# Patient Record
Sex: FEMALE | Race: BLACK OR AFRICAN AMERICAN | HISPANIC OR LATINO | Employment: STUDENT | URBAN - METROPOLITAN AREA
[De-identification: names, ages, dates, MRNs, and addresses within clinical notes are randomized per-mention and may not be internally consistent; named-entity substitution may affect disease eponyms.]

---

## 2017-07-24 ENCOUNTER — HOSPITAL ENCOUNTER (EMERGENCY)
Facility: HOSPITAL | Age: 22
Discharge: HOME | End: 2017-07-24
Attending: EMERGENCY MEDICINE | Admitting: EMERGENCY MEDICINE
Payer: COMMERCIAL

## 2017-07-24 VITALS
SYSTOLIC BLOOD PRESSURE: 118 MMHG | DIASTOLIC BLOOD PRESSURE: 59 MMHG | HEART RATE: 72 BPM | TEMPERATURE: 98.5 F | RESPIRATION RATE: 18 BRPM | OXYGEN SATURATION: 100 % | WEIGHT: 210.76 LBS

## 2017-07-24 DIAGNOSIS — R55 SYNCOPE AND COLLAPSE: Primary | ICD-10-CM

## 2017-07-24 PROCEDURE — 93005 ELECTROCARDIOGRAM TRACING: CPT | Performed by: EMERGENCY MEDICINE

## 2017-07-24 PROCEDURE — 99284 EMERGENCY DEPT VISIT MOD MDM: CPT

## 2017-07-26 LAB
ATRIAL RATE: 93 BPM
P AXIS: 75 DEGREES
PR INTERVAL: 160 MS
QRS AXIS: 69 DEGREES
QRSD INTERVAL: 92 MS
QT INTERVAL: 324 MS
QTC INTERVAL: 402 MS
T WAVE AXIS: 52 DEGREES
VENTRICULAR RATE: 93 BPM

## 2017-09-20 ENCOUNTER — ALLSCRIPTS OFFICE VISIT (OUTPATIENT)
Dept: OTHER | Facility: OTHER | Age: 22
End: 2017-09-20

## 2017-09-20 DIAGNOSIS — F32.9 MAJOR DEPRESSIVE DISORDER, SINGLE EPISODE: ICD-10-CM

## 2017-09-20 DIAGNOSIS — R55 SYNCOPE AND COLLAPSE: ICD-10-CM

## 2017-09-20 DIAGNOSIS — E66.9 OBESITY: ICD-10-CM

## 2018-01-11 NOTE — MISCELLANEOUS
Message  telephone call from patient  Requesting refill of OCP--Annual exam due 8/2015  Rx  for 2 month supply sent to [facility] pharmacy  Partner to schedule AE to obtain any additional refills beyond that      Active Problems    1  Back pain (724 5) (M54 9)   2  Contraception management (V25 9) (Z30 9)   3  Depression (311) (F32 9)   4  Obesity (278 00) (E66 9)   5  Pelvic pain in female (625 9) (R10 2)   6  Screen for STD (sexually transmitted disease) (V74 5) (Z11 3)   7  Vaginal discharge (623 5) (N89 8)   8  Vision problem (V41 0) (H54 7)   9  Yeast infection (112 9) (B37 9)    Current Meds   1  Ibuprofen 200 MG Oral Tablet; 3 tablets every 6 to 8 hrs as needed for pain; Therapy: [id number] to (Last Rx:11Aug2015)  Requested for: 11Aug2015 Ordered   2  Sprintec 28 0 25-35 MG-MCG Oral Tablet; TAKE 1 TABLET DAILY AS DIRECTED; Last   Rx:11Aug2015 Ordered    Allergies    1  No Known Drug Allergies    2  No Known Environmental Allergies   3   No Known Food Allergies    Signatures   Electronically signed by : Lokesh Hoover RN; Jun 20 2016  1:37PM EST                       (Author)

## 2018-01-12 NOTE — MISCELLANEOUS
Provider Comments  Provider Comments:   pt did not show  needs to reschedule        Signatures   Electronically signed by : Pelon Romero DO; Feb 12 2016 11:08AM EST                       (Author)    Electronically signed by : Abeba Escobedo DO; Feb 12 2016 11:29AM EST                       (Review)

## 2018-01-13 VITALS
BODY MASS INDEX: 33.19 KG/M2 | HEART RATE: 80 BPM | HEIGHT: 66 IN | WEIGHT: 206.5 LBS | DIASTOLIC BLOOD PRESSURE: 62 MMHG | RESPIRATION RATE: 18 BRPM | TEMPERATURE: 98.3 F | SYSTOLIC BLOOD PRESSURE: 110 MMHG

## 2018-01-25 ENCOUNTER — OFFICE VISIT (OUTPATIENT)
Dept: FAMILY MEDICINE CLINIC | Facility: CLINIC | Age: 23
End: 2018-01-25
Payer: COMMERCIAL

## 2018-01-25 VITALS
HEIGHT: 67 IN | WEIGHT: 211.4 LBS | BODY MASS INDEX: 33.18 KG/M2 | SYSTOLIC BLOOD PRESSURE: 120 MMHG | HEART RATE: 16 BPM | DIASTOLIC BLOOD PRESSURE: 70 MMHG | RESPIRATION RATE: 18 BRPM | TEMPERATURE: 98.7 F

## 2018-01-25 DIAGNOSIS — R10.2 PELVIC PAIN: ICD-10-CM

## 2018-01-25 DIAGNOSIS — R10.2 PELVIC PAIN: Primary | ICD-10-CM

## 2018-01-25 PROBLEM — Z30.09 FAMILY PLANNING: Status: ACTIVE | Noted: 2018-01-25

## 2018-01-25 PROBLEM — L70.0 ACNE VULGARIS: Status: ACTIVE | Noted: 2017-09-20

## 2018-01-25 PROBLEM — L85.8 KERATOSIS PILARIS: Status: ACTIVE | Noted: 2017-09-20

## 2018-01-25 LAB
BACTERIA UR QL AUTO: NORMAL /HPF
BILIRUB UR QL STRIP: NEGATIVE
CLARITY UR: CLEAR
COLOR UR: YELLOW
GLUCOSE UR STRIP-MCNC: NEGATIVE MG/DL
HGB UR QL STRIP.AUTO: NEGATIVE
HYALINE CASTS #/AREA URNS LPF: NORMAL /LPF
KETONES UR STRIP-MCNC: NEGATIVE MG/DL
LEUKOCYTE ESTERASE UR QL STRIP: NEGATIVE
NITRITE UR QL STRIP: NEGATIVE
NON-SQ EPI CELLS URNS QL MICRO: NORMAL /HPF
PH UR STRIP.AUTO: 7.5 [PH] (ref 4.5–8)
PROT UR STRIP-MCNC: NEGATIVE MG/DL
RBC #/AREA URNS AUTO: NORMAL /HPF
SL AMB  POCT GLUCOSE, UA: NORMAL
SL AMB LEUKOCYTE ESTERASE,UA: NORMAL
SL AMB POCT BILIRUBIN,UA: NORMAL
SL AMB POCT BLOOD,UA: NORMAL
SL AMB POCT CLARITY,UA: CLEAR
SL AMB POCT COLOR,UA: YELLOW
SL AMB POCT KETONES,UA: NORMAL
SL AMB POCT NITRITE,UA: NORMAL
SL AMB POCT PH,UA: 7.5
SL AMB POCT SPECIFIC GRAVITY,UA: 1
SL AMB POCT URINE HCG: NEGATIVE
SP GR UR STRIP.AUTO: 1.02 (ref 1–1.03)
UROBILINOGEN UR QL STRIP.AUTO: 1 E.U./DL
WBC #/AREA URNS AUTO: NORMAL /HPF

## 2018-01-25 PROCEDURE — 99213 OFFICE O/P EST LOW 20 MIN: CPT | Performed by: NURSE PRACTITIONER

## 2018-01-25 PROCEDURE — 81001 URINALYSIS AUTO W/SCOPE: CPT | Performed by: NURSE PRACTITIONER

## 2018-01-25 PROCEDURE — 81002 URINALYSIS NONAUTO W/O SCOPE: CPT | Performed by: NURSE PRACTITIONER

## 2018-01-25 PROCEDURE — 81025 URINE PREGNANCY TEST: CPT | Performed by: NURSE PRACTITIONER

## 2018-01-25 PROCEDURE — 81003 URINALYSIS AUTO W/O SCOPE: CPT | Performed by: NURSE PRACTITIONER

## 2018-01-25 PROCEDURE — 87660 TRICHOMONAS VAGIN DIR PROBE: CPT | Performed by: NURSE PRACTITIONER

## 2018-01-25 PROCEDURE — 87480 CANDIDA DNA DIR PROBE: CPT | Performed by: NURSE PRACTITIONER

## 2018-01-25 PROCEDURE — 87510 GARDNER VAG DNA DIR PROBE: CPT | Performed by: NURSE PRACTITIONER

## 2018-01-25 RX ORDER — IBUPROFEN 200 MG
3 TABLET ORAL
COMMUNITY
Start: 2014-10-16 | End: 2018-04-12 | Stop reason: DRUGHIGH

## 2018-01-25 RX ORDER — CLINDAMYCIN PHOSPHATE 10 MG/G
1 GEL TOPICAL 2 TIMES DAILY
COMMUNITY
Start: 2017-09-20 | End: 2018-04-26 | Stop reason: HOSPADM

## 2018-01-25 RX ORDER — NORGESTIMATE AND ETHINYL ESTRADIOL 0.25-0.035
1 KIT ORAL DAILY
COMMUNITY
Start: 2016-09-15 | End: 2018-04-26 | Stop reason: HOSPADM

## 2018-01-25 RX ORDER — NORGESTREL-ETHINYL ESTRADIOL 0.3-0.03MG
TABLET ORAL
Refills: 4 | COMMUNITY
Start: 2018-01-16 | End: 2019-04-22 | Stop reason: SDUPTHER

## 2018-01-25 NOTE — PROGRESS NOTES
Assessment/Plan:    Pelvic pain  -cmp  -cbcwdiff  -Gc/CH DNA urine  -affirm  -urinalysis-poc  -US pelvic/abd transvaginal  Education- ddx, precautions for follow up/RTO/CALL/ED  Follow up here 1-2 weeks after labs and us complete  Subjective:      Patient ID: Kimberly Santos is a 25 y o  female  With c/o right sided pelvic pain that travels to the back  Onset December 2017  Occurs daily  Sharp pain, intermittent begins in RL abdomen/pelvis and travels to RL back  Duration-hours  Relieved when she lays on her right side  Denies N/V/D, change in appetite, hematuria, dysuria, vaginal d/c or blood, fever, malaise  LMP- 1/16/2018 and normal  +OCPs  +sexually active  Never had these symptoms before  No other treatments tried  Back Pain   Associated symptoms include abdominal pain and pelvic pain  Pertinent negatives include no dysuria, fever, headaches or numbness  Review of Systems   Constitutional: Negative for appetite change, chills, fever and unexpected weight change  Gastrointestinal: Positive for abdominal pain  Negative for abdominal distention, constipation, diarrhea, nausea and vomiting  Genitourinary: Positive for pelvic pain  Negative for difficulty urinating, dysuria, flank pain, hematuria, menstrual problem, urgency, vaginal discharge and vaginal pain  Musculoskeletal: Positive for back pain  Negative for joint swelling and myalgias  Skin: Negative for rash  Neurological: Negative for numbness and headaches  Objective:     Physical Exam   Constitutional: She appears well-developed and well-nourished  No distress  HENT:   Head: Normocephalic  Mouth/Throat: Oropharynx is clear and moist    Eyes: Conjunctivae are normal  Pupils are equal, round, and reactive to light  Neck: Normal range of motion  Neck supple  No thyromegaly present  Cardiovascular: Normal rate, regular rhythm, normal heart sounds and intact distal pulses      Pulmonary/Chest: Effort normal and breath sounds normal    Abdominal: Soft  Bowel sounds are normal  She exhibits no distension and no mass  There is tenderness  There is no rebound and no guarding  Genitourinary: Right adnexum displays tenderness  Right adnexum displays no mass  Vaginal discharge found

## 2018-01-25 NOTE — PATIENT INSTRUCTIONS
Pelvic Pain in Women   AMBULATORY CARE:   Pelvic pain  may happen on one or both sides of your pelvis  Pelvic pain may occur with certain body positions or activities, such as when you have sex or a bowel movement  It may worsen during your monthly period or after you sit or stand for a long time  Chronic pelvic pain is pain that continues for longer than 6 months  Call 911 for any of the following:   · You have severe chest pain and sudden trouble breathing  Seek care immediately if:   · You have heavy or unusual vaginal bleeding, and you feel lightheaded or faint  Contact your healthcare provider if:   · You have pelvic pain that does not go away after you take pain medicine  · You develop new symptoms or your symptoms are worse than before  · You have questions or concerns about your condition or care  Medicines: You may need any of the following:  · Pain medicine  may be given in pills or creams to relieve your pain  · Hormones  may be given if your pain gets worse with your menstrual cycle  · Antibiotics  may be given if your pain is caused by infection  · Take your medicine as directed  Contact your healthcare provider if you think your medicine is not helping or if you have side effects  Tell him or her if you are allergic to any medicine  Keep a list of the medicines, vitamins, and herbs you take  Include the amounts, and when and why you take them  Bring the list or the pill bottles to follow-up visits  Carry your medicine list with you in case of an emergency  Self-care:   · Keep a pain diary  Write down when your pain happens, how severe it is, and any other symptoms you have with your pain  A diary will help you keep track of pain cycles  It may also help your healthcare provider find out what is causing your pain  · Learn ways to relax  Deep breathing, meditation, and relaxation techniques can help decrease your pain   When you are tense, your pain may increase  · Change the foods you eat if you have irritable bowel syndrome  Ask your healthcare provider about the best foods for you  Follow up with your healthcare provider as directed:  Write down your questions so you remember to ask them during your visits  © 2017 [address] [employer] Information is for End User's use only and may not be sold, redistributed or otherwise used for commercial purposes  All illustrations and images included in CareNotes® are the copyrighted property of A D A M , Inc  or N[employer]  The above information is an  only  It is not intended as medical advice for individual conditions or treatments  Talk to your doctor, nurse or pharmacist before following any medical regimen to see if it is safe and effective for you

## 2018-01-27 LAB
CANDIDA RRNA VAG QL PROBE: NEGATIVE
G VAGINALIS RRNA GENITAL QL PROBE: NEGATIVE
T VAGINALIS RRNA GENITAL QL PROBE: NEGATIVE

## 2018-01-31 ENCOUNTER — TRANSCRIBE ORDERS (OUTPATIENT)
Dept: ADMINISTRATIVE | Facility: HOSPITAL | Age: 23
End: 2018-01-31

## 2018-01-31 ENCOUNTER — HOSPITAL ENCOUNTER (OUTPATIENT)
Dept: ULTRASOUND IMAGING | Facility: HOSPITAL | Age: 23
Discharge: HOME | End: 2018-01-31
Payer: COMMERCIAL

## 2018-01-31 DIAGNOSIS — R10.2 PELVIC PAIN: ICD-10-CM

## 2018-01-31 PROCEDURE — 76856 US EXAM PELVIC COMPLETE: CPT

## 2018-01-31 PROCEDURE — 76700 US EXAM ABDOM COMPLETE: CPT

## 2018-01-31 PROCEDURE — 76830 TRANSVAGINAL US NON-OB: CPT

## 2018-04-12 ENCOUNTER — OFFICE VISIT (OUTPATIENT)
Dept: FAMILY MEDICINE CLINIC | Facility: CLINIC | Age: 23
End: 2018-04-12
Payer: COMMERCIAL

## 2018-04-12 VITALS
HEIGHT: 67 IN | DIASTOLIC BLOOD PRESSURE: 80 MMHG | TEMPERATURE: 96.9 F | SYSTOLIC BLOOD PRESSURE: 118 MMHG | RESPIRATION RATE: 18 BRPM | BODY MASS INDEX: 35.82 KG/M2 | WEIGHT: 228.2 LBS | HEART RATE: 78 BPM

## 2018-04-12 DIAGNOSIS — Z00.00 HEALTH MAINTENANCE EXAMINATION: Primary | ICD-10-CM

## 2018-04-12 DIAGNOSIS — R55 VASOVAGAL SYNCOPE: ICD-10-CM

## 2018-04-12 DIAGNOSIS — F41.8 DEPRESSION WITH ANXIETY: ICD-10-CM

## 2018-04-12 DIAGNOSIS — M25.571 CHRONIC PAIN OF RIGHT ANKLE: ICD-10-CM

## 2018-04-12 DIAGNOSIS — E66.09 CLASS 2 OBESITY DUE TO EXCESS CALORIES WITHOUT SERIOUS COMORBIDITY IN ADULT, UNSPECIFIED BMI: ICD-10-CM

## 2018-04-12 DIAGNOSIS — G89.29 CHRONIC PAIN OF RIGHT ANKLE: ICD-10-CM

## 2018-04-12 DIAGNOSIS — N94.6 DYSMENORRHEA: ICD-10-CM

## 2018-04-12 PROCEDURE — 99395 PREV VISIT EST AGE 18-39: CPT | Performed by: NURSE PRACTITIONER

## 2018-04-12 RX ORDER — FLUOXETINE HYDROCHLORIDE 20 MG/1
20 CAPSULE ORAL DAILY
COMMUNITY
End: 2018-04-26 | Stop reason: HOSPADM

## 2018-04-12 RX ORDER — HYDROXYZINE PAMOATE 25 MG/1
25 CAPSULE ORAL 2 TIMES DAILY
COMMUNITY
End: 2018-04-26 | Stop reason: HOSPADM

## 2018-04-12 RX ORDER — IBUPROFEN 800 MG/1
800 TABLET ORAL EVERY 8 HOURS PRN
Qty: 30 TABLET | Refills: 1 | Status: SHIPPED | OUTPATIENT
Start: 2018-04-12 | End: 2018-07-12 | Stop reason: ALTCHOICE

## 2018-04-12 RX ORDER — MELOXICAM 15 MG/1
15 TABLET ORAL DAILY
Qty: 30 TABLET | Refills: 1 | Status: SHIPPED | OUTPATIENT
Start: 2018-04-12 | End: 2018-04-12 | Stop reason: SDUPTHER

## 2018-04-12 RX ORDER — MELOXICAM 15 MG/1
15 TABLET ORAL DAILY PRN
Qty: 30 TABLET | Refills: 0 | Status: SHIPPED | OUTPATIENT
Start: 2018-04-12 | End: 2018-04-26 | Stop reason: HOSPADM

## 2018-04-12 NOTE — PATIENT INSTRUCTIONS

## 2018-04-12 NOTE — PROGRESS NOTES
Problem List Items Addressed This Visit     Obesity    Relevant Orders    CBC and differential    Lipid panel    Basic metabolic panel    TSH, 3rd generation with T4 reflex    Chronic pain of right ankle     Onset 8 years ago after injury  Pain and reduced ROM           Relevant Medications    meloxicam (MOBIC) 15 mg tablet    Other Relevant Orders    XR ankle 3+ vw right    Ambulatory referral to Physical Therapy    Vasovagal syncope    Relevant Orders    Ambulatory referral to Cardiology    Depression with anxiety     Followed by counselor weekly and psychiatry monthly  at [facility]           Relevant Medications    FLUoxetine (PROzac) 20 mg capsule    hydrOXYzine pamoate (VISTARIL) 25 mg capsule    Other Relevant Orders    TSH, 3rd generation with T4 reflex    Dysmenorrhea     Uses ibuprofen prn  Requesting RX  Do not use on same day as taking meloxicam         Relevant Medications    ibuprofen (MOTRIN) 800 mg tablet    Health maintenance examination - Primary             Patient Counseling:  --Nutrition: Stressed importance of moderation in sodium/caffeine intake, saturated fat and cholesterol, caloric balance, sufficient intake of fresh fruits, vegetables, fiber, calcium, iron  --Exercise: Stressed the importance of regular exercise  --Substance Abuse: Discussed primary prevention of tobacco, alcohol, or other drug use; driving or other dangerous activities under the influence; availability of treatment for abuse  --Sexuality: Discussed sexually transmitted diseases, partner selection, use of condoms, avoidance of unintended pregnancy  and contraceptive alternatives  --Injury prevention: Discussed safety belts, safety helmets, smoke detector, smoking near bedding or upholstery  --Dental health: Discussed importance of regular tooth brushing, flossing, and dental visits  --Immunizations reviewed     --After hours service discussed with patient     Follow up in one year      Subjective   PHQ-9 Depression Screening    PHQ-9:    Frequency of the following problems over the past two weeks:       Little interest or pleasure in doing things:  2 - more than half the days  Feeling down, depressed, or hopeless:  0 - not at all  Trouble falling or staying asleep, or sleeping too much:  0 - not at all  Feeling tired or having little energy:  0 - not at all  Poor appetite or overeatin - more than half the days  Feeling bad about yourself - or that you are a failure or have let yourself or your family down:  2 - more than half the days  Trouble concentrating on things, such as reading the newspaper or watching television:  0 - not at all  Moving or speaking so slowly that other people could have noticed  Or the opposite - being so fidgety or restless that you have been moving around a lot more than usual:  0 - not at all  Thoughts that you would be better off dead, or of hurting yourself in some way:  0 - not at all  PHQ-2 Score:  2  PHQ-9 Score:  6       Pastor Santamaria is a 25 y o  female and is here for a comprehensive physical exam  The patient reports problems - :  Injured right ankle 8 years ago, began working out at gym recently and is it's painful and has limited rom  Do you take any herbs or supplements that were not prescribed by a doctor? no      The following portions of the patient's history were reviewed and updated as appropriate: allergies, current medications, past family history, past medical history, past social history, past surgical history and problem list    No past medical history on file      Current Outpatient Prescriptions:     CRYSELLE-28 0 3-30 MG-MCG per tablet, TAKE 1 TABLET BY ORAL ROUTE EVERY DAY, Disp: , Rfl: 4    FLUoxetine (PROzac) 20 mg capsule, Take 20 mg by mouth daily, Disp: , Rfl:     hydrOXYzine pamoate (VISTARIL) 25 mg capsule, Take 25 mg by mouth 2 (two) times a day, Disp: , Rfl:     benzoyl peroxide 5 % gel, Apply 1 application topically Twice daily, Disp: , Rfl:    clindamycin (CLINDAGEL) 1 % gel, Apply 1 application topically 2 (two) times a day, Disp: , Rfl:     ibuprofen (MOTRIN) 800 mg tablet, Take 1 tablet (800 mg total) by mouth every 8 (eight) hours as needed for mild pain or moderate pain Do not take on same day as taking meloxicam, Disp: 30 tablet, Rfl: 1    meloxicam (MOBIC) 15 mg tablet, Take 1 tablet (15 mg total) by mouth daily as needed for moderate pain, Disp: 30 tablet, Rfl: 0    norgestimate-ethinyl estradiol (SPRINTEC 28) 0 25-35 MG-MCG per tablet, Take 1 tablet by mouth daily, Disp: , Rfl:   No past surgical history on file  Social History   Family History   Problem Relation Age of Onset    No Known Problems Mother     Diabetes Father     Diabetes Maternal Grandfather     Diabetes Paternal Grandfather     Cancer Paternal Aunt      Review of Systems    Pertinent items are noted in HPI  Barbara Mena And comments in A/P    Objective   Physical Exam   Constitutional: She is oriented to person, place, and time  She appears well-developed and well-nourished  Obese     HENT:   Head: Normocephalic and atraumatic  Right Ear: External ear normal    Left Ear: External ear normal    Nose: Nose normal    Mouth/Throat: Oropharynx is clear and moist    Eyes: Conjunctivae and EOM are normal  Pupils are equal, round, and reactive to light  No scleral icterus  Neck: Normal range of motion  Neck supple  No JVD present  No thyromegaly present  Cardiovascular: Normal rate, regular rhythm, normal heart sounds and intact distal pulses  Pulmonary/Chest: Effort normal and breath sounds normal    Abdominal: Soft  Bowel sounds are normal    Musculoskeletal: She exhibits no edema or deformity  Right ankle Decreased ROM inversion/eversion d/t pain  Lateral maleolous tender   Lymphadenopathy:     She has no cervical adenopathy  Neurological: She is alert and oriented to person, place, and time  She displays normal reflexes  No cranial nerve deficit     Skin: Skin is warm and dry  Capillary refill takes less than 2 seconds  No rash noted  Psychiatric: She has a normal mood and affect

## 2018-04-26 ENCOUNTER — OFFICE VISIT (OUTPATIENT)
Dept: CARDIOLOGY CLINIC | Facility: CLINIC | Age: 23
End: 2018-04-26
Payer: COMMERCIAL

## 2018-04-26 VITALS
WEIGHT: 233 LBS | SYSTOLIC BLOOD PRESSURE: 110 MMHG | DIASTOLIC BLOOD PRESSURE: 60 MMHG | BODY MASS INDEX: 36.06 KG/M2 | OXYGEN SATURATION: 98 % | HEART RATE: 58 BPM

## 2018-04-26 DIAGNOSIS — R55 VASOVAGAL SYNCOPE: ICD-10-CM

## 2018-04-26 DIAGNOSIS — R55 SYNCOPE, UNSPECIFIED SYNCOPE TYPE: Primary | ICD-10-CM

## 2018-04-26 PROCEDURE — 93000 ELECTROCARDIOGRAM COMPLETE: CPT | Performed by: INTERNAL MEDICINE

## 2018-04-26 PROCEDURE — 99244 OFF/OP CNSLTJ NEW/EST MOD 40: CPT | Performed by: INTERNAL MEDICINE

## 2018-04-26 NOTE — PROGRESS NOTES
Cardiology Consultation     Yobany Holly  [id number]  1995  [facility]      1  Syncope, unspecified syncope type  POCT ECG    Holter monitor - 24 hour    Echo complete with contrast if indicated   2  Vasovagal syncope  Ambulatory referral to Cardiology    POCT ECG       Discussion/Summary:      36-year-old female  Two episodes of syncope  Seem to be related to orthostasis /dehydration low blood pressure  No clear cardiac etiology is found  She denies any palpitations  There were no other red flag symptoms  I recommended echocardiogram to exclude any structural heart disease  I have also ordered a 24 hr monitor to exclude any significant arrhythmias  We had a long discussion about remaining adequately hydrated  I have recommended electrolyte containing solutions such as Gatorade, smart water or propel water  If no significant abnormalities noted on the testing, can follow on an as needed basis  History of Present Illness:    36-year-old female  She is referred to the office by Chantale Jackson for two episodes of syncope  On both occasions, the patient was standing  One episode was relatively recently, with the patient was outside eating ice cream   She felt dizzy and lightheaded, and then fell to the ground  Patient's mother actually says she was out for "an hour" and then when she regained consciousness, she felt dizzy still  The other episode was a few months ago  Similarly, the patient was standing  She had been standing for a while  And again felt dizzy  She fell to the ground  Normal childhood  No cardiac testing in the past   No family history of any cardiac disease or sudden cardiac death  The patient denies any palpitations fluttering, chest pain, shortness of breath        She tells me that she tries to stay adequately hydrated, drinks about a glass of water a day  She has been told by other providers as well to increase her electrolytes and sodium intake  Patient Active Problem List   Diagnosis    Acne vulgaris    Back pain    Family planning    Depression    Keratosis pilaris    Obesity    Pelvic pain    Chronic pain of right ankle    Vasovagal syncope    Depression with anxiety    Dysmenorrhea    Health maintenance examination     No past medical history on file  Social History     Social History    Marital status: Single     Spouse name: N/A    Number of children: N/A    Years of education: N/A     Occupational History    college student      Social History Main Topics    Smoking status: Never Smoker    Smokeless tobacco: Never Used    Alcohol use No    Drug use: No    Sexual activity: Not on file     Other Topics Concern    Not on file     Social History Narrative    Always uses seat belt    College student     ancestry    Lives with parents, and 2 brothers  No smokers  Pt reports feeling safe at home  Primary spoken language English    Unemployed           Family History   Problem Relation Age of Onset    No Known Problems Mother     Diabetes Father     Diabetes Maternal Grandfather     Diabetes Paternal Grandfather     Cancer Paternal Aunt      No past surgical history on file      Current Outpatient Prescriptions:     benzoyl peroxide 5 % gel, Apply 1 application topically Twice daily, Disp: , Rfl:     CRYSELLE-28 0 3-30 MG-MCG per tablet, TAKE 1 TABLET BY ORAL ROUTE EVERY DAY, Disp: , Rfl: 4    ibuprofen (MOTRIN) 800 mg tablet, Take 1 tablet (800 mg total) by mouth every 8 (eight) hours as needed for mild pain or moderate pain Do not take on same day as taking meloxicam, Disp: 30 tablet, Rfl: 1  No Known Allergies    Vitals:    04/26/18 0851   BP: 110/60   BP Location: Right arm   Patient Position: Sitting   Cuff Size: Standard   Pulse: 58   SpO2: 98%   Weight: 106 kg (233 lb) Vitals:    04/26/18 0851   Weight: 106 kg (233 lb)          Body mass index is 36 06 kg/m²  Physical Exam:  GENERAL: Alert, well appearing, and in no distress  HEENT:  PERRL, EOMI, no scleral icterus, no conjunctival pallor  NECK:  Supple, No elevated JVP, no thyromegaly, no carotid bruits  HEART:  Regular rate and rhythm, normal S1/S2, no S3/S4, no murmur or rub  LUNGS:  Clear to auscultation bilaterally  ABDOMEN:  Soft, non-tender, positive bowel sounds, no rebound or guarding  EXTREMITIES:  No edema  VASCULAR:  Normal pedal pulses   NEURO: No focal deficits,  SKIN: Normal without suspicious lesions on exposed skin    ROS:  Positive for fainting  Except as noted in HPI, is otherwise reviewed in detail and a 12 point review of systems is negative  Labs:  Lab Results   Component Value Date     06/30/2015    K 4 0 06/30/2015     06/30/2015    CREATININE 0 59 (L) 06/30/2015    BUN 7 06/30/2015    CO2 24 06/30/2015    ALT 19 06/30/2015    AST 15 06/30/2015    INR 1 07 06/29/2015    HGBA1C 5 4 08/19/2014    WBC 9 60 06/30/2015    HGB 12 6 06/30/2015    HCT 33 8 (L) 06/30/2015     06/30/2015       Lab Results   Component Value Date    CHOL 193 08/19/2014     Lab Results   Component Value Date    HDL 42 08/19/2014     Lab Results   Component Value Date    LDLCALC 122 (H) 08/19/2014     Lab Results   Component Value Date    TRIG 146 08/19/2014     EKG:  Sinus rhythm, 58 beats per minute  Normal EKG

## 2018-05-11 ENCOUNTER — EVALUATION (OUTPATIENT)
Dept: PHYSICAL THERAPY | Facility: OTHER | Age: 23
End: 2018-05-11
Payer: COMMERCIAL

## 2018-05-11 DIAGNOSIS — M25.571 CHRONIC PAIN OF RIGHT ANKLE: ICD-10-CM

## 2018-05-11 DIAGNOSIS — G89.29 CHRONIC PAIN OF RIGHT ANKLE: ICD-10-CM

## 2018-05-11 PROCEDURE — G8979 MOBILITY GOAL STATUS: HCPCS | Performed by: PHYSICAL THERAPIST

## 2018-05-11 PROCEDURE — G8978 MOBILITY CURRENT STATUS: HCPCS | Performed by: PHYSICAL THERAPIST

## 2018-05-11 PROCEDURE — 97110 THERAPEUTIC EXERCISES: CPT | Performed by: PHYSICAL THERAPIST

## 2018-05-11 PROCEDURE — 97161 PT EVAL LOW COMPLEX 20 MIN: CPT | Performed by: PHYSICAL THERAPIST

## 2018-05-11 PROCEDURE — 97140 MANUAL THERAPY 1/> REGIONS: CPT | Performed by: PHYSICAL THERAPIST

## 2018-05-16 ENCOUNTER — OFFICE VISIT (OUTPATIENT)
Dept: PHYSICAL THERAPY | Facility: OTHER | Age: 23
End: 2018-05-16
Payer: COMMERCIAL

## 2018-05-16 DIAGNOSIS — G89.29 CHRONIC PAIN OF RIGHT ANKLE: Primary | ICD-10-CM

## 2018-05-16 DIAGNOSIS — M25.571 CHRONIC PAIN OF RIGHT ANKLE: Primary | ICD-10-CM

## 2018-05-16 PROCEDURE — 97110 THERAPEUTIC EXERCISES: CPT

## 2018-05-16 PROCEDURE — 97112 NEUROMUSCULAR REEDUCATION: CPT

## 2018-05-16 PROCEDURE — 97140 MANUAL THERAPY 1/> REGIONS: CPT

## 2018-05-16 NOTE — PROGRESS NOTES
Daily Note     Today's date: 2018  Patient name: Veronica Del Angel  : 1995  MRN: [id number]  Referring provider: MADDIE Hammer  Dx:   Encounter Diagnosis     ICD-10-CM    1  Chronic pain of right ankle M25 571     G89 29         1:1 with PTA CR 10:00-10:45  Subjective: Patient reports being very sore after her I E but currently denies pain  Daily compliance with HEP offering no questions or concerns  Objective: See treatment diary below    Precautions: none   Daily Treatment Diary      Manual                        Laser ant ankle   5 mins                     PROM DF stretch  CR                                                                                                   Exercise Diary                        TB  PF/DF   GTB  x20 ea                        Bike  5 mins                     Plantar F stretch   10"x10                     Mini squat   2x10                      HR/TR  20 ea                       slantboard gastroc stretch  10"x10                      rockerboard  20 ea                       wobbleboard 20 ea                       SLS  NV                                                                                                                                                                                                                                                                                                   Modalities                         CP  --                                                               Assessment: Tolerated treatment well   Into PT with flip flops- advised to wear sneakers to future sessions for improved ankle support with weight bearing activities  Cues for equal weight bearing with rockerboard and mini squats  Patient would benefit from continued PT      Plan: Continue per plan of care

## 2018-05-23 ENCOUNTER — OFFICE VISIT (OUTPATIENT)
Dept: PHYSICAL THERAPY | Facility: OTHER | Age: 23
End: 2018-05-23
Payer: COMMERCIAL

## 2018-05-23 DIAGNOSIS — M25.571 CHRONIC PAIN OF RIGHT ANKLE: Primary | ICD-10-CM

## 2018-05-23 DIAGNOSIS — G89.29 CHRONIC PAIN OF RIGHT ANKLE: Primary | ICD-10-CM

## 2018-05-23 PROCEDURE — 97110 THERAPEUTIC EXERCISES: CPT

## 2018-05-23 PROCEDURE — 97140 MANUAL THERAPY 1/> REGIONS: CPT

## 2018-05-23 PROCEDURE — 97112 NEUROMUSCULAR REEDUCATION: CPT

## 2018-05-23 NOTE — PROGRESS NOTES
Daily Note     Today's date: 2018  Patient name: Angi Arechiga  : 1995  MRN: [id number]  Referring provider: MADDIE Sin  Dx:   Encounter Diagnosis     ICD-10-CM    1  Chronic pain of right ankle M25 571     G89 29         1:1 with PTA CR 11:30- 12:30  Subjective: Denies increased pain after last session and currently  Objective: See treatment diary below    Precautions: none   Daily Treatment Diary      Manual                      Laser ant ankle   5 mins 5 mins                   PROM DF stretch  CR  CR                                                                                                 Exercise Diary                      TB  PF/DF   GTB  x20 ea   GTB  x30 ea                    Bike  5 mins  10 mins                   Plantar F stretch   10"x10  10"x10                   Mini squat   2x10  3x10                    HR/TR  20 ea   30 ea                     slantboard gastroc stretch  10"x10  10"x10                    rockerboard  20 ea   20ea/30"                    wobbleboard 20 ea   20 ea                     SLS  NV  30"x3                    Biodex   LOS  Static x2                                                                                                                                                                                                                                                                         Modalities                         CP  --                                                               Assessment: Tolerated treatment well   Attempted SLS wobbleboard however unable to tolerate  Limited tolerance to addition of SLS due to medial pain  Patient would benefit from continued PT      Plan: Continue per plan of care

## 2018-05-24 ENCOUNTER — HOSPITAL ENCOUNTER (OUTPATIENT)
Dept: NON INVASIVE DIAGNOSTICS | Facility: HOSPITAL | Age: 23
Discharge: HOME | End: 2018-05-24
Attending: INTERNAL MEDICINE
Payer: COMMERCIAL

## 2018-05-24 ENCOUNTER — TRANSCRIBE ORDERS (OUTPATIENT)
Dept: RADIOLOGY | Facility: HOSPITAL | Age: 23
End: 2018-05-24

## 2018-05-24 DIAGNOSIS — R55 SYNCOPE, UNSPECIFIED SYNCOPE TYPE: ICD-10-CM

## 2018-05-24 PROCEDURE — 93225 XTRNL ECG REC<48 HRS REC: CPT

## 2018-05-24 PROCEDURE — 93306 TTE W/DOPPLER COMPLETE: CPT

## 2018-05-24 PROCEDURE — 93306 TTE W/DOPPLER COMPLETE: CPT | Performed by: INTERNAL MEDICINE

## 2018-05-24 PROCEDURE — 93226 XTRNL ECG REC<48 HR SCAN A/R: CPT

## 2018-05-29 ENCOUNTER — TELEPHONE (OUTPATIENT)
Dept: NON INVASIVE DIAGNOSTICS | Facility: HOSPITAL | Age: 23
End: 2018-05-29

## 2018-05-29 DIAGNOSIS — R55 SYNCOPE, UNSPECIFIED SYNCOPE TYPE: Primary | ICD-10-CM

## 2018-05-29 DIAGNOSIS — R93.1 ABNORMAL ECHOCARDIOGRAM: ICD-10-CM

## 2018-05-29 NOTE — TELEPHONE ENCOUNTER
Please help arrange cardiac MRI for patient  I spoke to her, she is aware of need to schedule  Also needs follow up after MRI to discuss results  Thanks

## 2018-05-30 ENCOUNTER — APPOINTMENT (OUTPATIENT)
Dept: PHYSICAL THERAPY | Facility: OTHER | Age: 23
End: 2018-05-30
Payer: COMMERCIAL

## 2018-05-31 PROCEDURE — 93227 XTRNL ECG REC<48 HR R&I: CPT | Performed by: INTERNAL MEDICINE

## 2018-06-11 DIAGNOSIS — M25.571 CHRONIC PAIN OF RIGHT ANKLE: ICD-10-CM

## 2018-06-11 DIAGNOSIS — G89.29 CHRONIC PAIN OF RIGHT ANKLE: ICD-10-CM

## 2018-06-12 DIAGNOSIS — L70.0 ACNE VULGARIS: Primary | ICD-10-CM

## 2018-06-12 RX ORDER — MELOXICAM 15 MG/1
TABLET ORAL
Qty: 30 TABLET | Refills: 0 | OUTPATIENT
Start: 2018-06-12

## 2018-06-18 ENCOUNTER — HOSPITAL ENCOUNTER (OUTPATIENT)
Dept: MRI IMAGING | Facility: HOSPITAL | Age: 23
Discharge: HOME | End: 2018-06-18
Attending: INTERNAL MEDICINE
Payer: COMMERCIAL

## 2018-06-18 DIAGNOSIS — R55 SYNCOPE, UNSPECIFIED SYNCOPE TYPE: ICD-10-CM

## 2018-06-18 DIAGNOSIS — R93.1 ABNORMAL ECHOCARDIOGRAM: ICD-10-CM

## 2018-06-18 PROCEDURE — 75561 CARDIAC MRI FOR MORPH W/DYE: CPT

## 2018-06-18 PROCEDURE — A9585 GADOBUTROL INJECTION: HCPCS | Performed by: INTERNAL MEDICINE

## 2018-06-18 RX ADMIN — GADOBUTROL 18 ML: 604.72 INJECTION INTRAVENOUS at 08:55

## 2018-06-29 NOTE — PROGRESS NOTES
Left message for patient to call office back  Cardiac MRI results are normal per Dr Christos Nicole

## 2018-07-10 ENCOUNTER — HOSPITAL ENCOUNTER (EMERGENCY)
Facility: HOSPITAL | Age: 23
Discharge: HOME | End: 2018-07-10
Attending: EMERGENCY MEDICINE | Admitting: EMERGENCY MEDICINE
Payer: COMMERCIAL

## 2018-07-10 ENCOUNTER — APPOINTMENT (EMERGENCY)
Dept: CT IMAGING | Facility: HOSPITAL | Age: 23
End: 2018-07-10
Payer: COMMERCIAL

## 2018-07-10 ENCOUNTER — APPOINTMENT (EMERGENCY)
Dept: RADIOLOGY | Facility: HOSPITAL | Age: 23
End: 2018-07-10
Payer: COMMERCIAL

## 2018-07-10 VITALS
TEMPERATURE: 97.8 F | HEART RATE: 64 BPM | OXYGEN SATURATION: 100 % | DIASTOLIC BLOOD PRESSURE: 67 MMHG | RESPIRATION RATE: 16 BRPM | SYSTOLIC BLOOD PRESSURE: 119 MMHG

## 2018-07-10 DIAGNOSIS — R55 NEAR SYNCOPE: ICD-10-CM

## 2018-07-10 DIAGNOSIS — N20.0 RENAL STONE: Primary | ICD-10-CM

## 2018-07-10 LAB
ALBUMIN SERPL BCP-MCNC: 3.6 G/DL (ref 3.5–5)
ALP SERPL-CCNC: 68 U/L (ref 46–116)
ALT SERPL W P-5'-P-CCNC: 20 U/L (ref 12–78)
ANION GAP SERPL CALCULATED.3IONS-SCNC: 12 MMOL/L (ref 4–13)
AST SERPL W P-5'-P-CCNC: 17 U/L (ref 5–45)
ATRIAL RATE: 62 BPM
ATRIAL RATE: 88 BPM
BACTERIA UR QL AUTO: ABNORMAL /HPF
BASOPHILS # BLD AUTO: 0.02 THOUSANDS/ΜL (ref 0–0.1)
BASOPHILS NFR BLD AUTO: 0 % (ref 0–1)
BILIRUB SERPL-MCNC: 0.4 MG/DL (ref 0.2–1)
BILIRUB UR QL STRIP: NEGATIVE
BUN SERPL-MCNC: 11 MG/DL (ref 5–25)
CALCIUM SERPL-MCNC: 9 MG/DL (ref 8.3–10.1)
CHLORIDE SERPL-SCNC: 104 MMOL/L (ref 100–108)
CLARITY UR: CLEAR
CO2 SERPL-SCNC: 24 MMOL/L (ref 21–32)
COLOR UR: YELLOW
CREAT SERPL-MCNC: 1.01 MG/DL (ref 0.6–1.3)
EOSINOPHIL # BLD AUTO: 0.05 THOUSAND/ΜL (ref 0–0.61)
EOSINOPHIL NFR BLD AUTO: 1 % (ref 0–6)
ERYTHROCYTE [DISTWIDTH] IN BLOOD BY AUTOMATED COUNT: 14.2 % (ref 11.6–15.1)
EXT PREG TEST URINE: NEGATIVE
GFR SERPL CREATININE-BSD FRML MDRD: 92 ML/MIN/1.73SQ M
GLUCOSE SERPL-MCNC: 100 MG/DL (ref 65–140)
GLUCOSE UR STRIP-MCNC: NEGATIVE MG/DL
HCT VFR BLD AUTO: 41.2 % (ref 34.8–46.1)
HGB BLD-MCNC: 13.8 G/DL (ref 11.5–15.4)
HGB UR QL STRIP.AUTO: ABNORMAL
KETONES UR STRIP-MCNC: NEGATIVE MG/DL
LEUKOCYTE ESTERASE UR QL STRIP: ABNORMAL
LIPASE SERPL-CCNC: 128 U/L (ref 73–393)
LYMPHOCYTES # BLD AUTO: 2.98 THOUSANDS/ΜL (ref 0.6–4.47)
LYMPHOCYTES NFR BLD AUTO: 30 % (ref 14–44)
MCH RBC QN AUTO: 30.2 PG (ref 26.8–34.3)
MCHC RBC AUTO-ENTMCNC: 33.5 G/DL (ref 31.4–37.4)
MCV RBC AUTO: 90 FL (ref 82–98)
MONOCYTES # BLD AUTO: 0.65 THOUSAND/ΜL (ref 0.17–1.22)
MONOCYTES NFR BLD AUTO: 7 % (ref 4–12)
NEUTROPHILS # BLD AUTO: 6.27 THOUSANDS/ΜL (ref 1.85–7.62)
NEUTS SEG NFR BLD AUTO: 63 % (ref 43–75)
NITRITE UR QL STRIP: NEGATIVE
NON-SQ EPI CELLS URNS QL MICRO: ABNORMAL /HPF
P AXIS: 54 DEGREES
P AXIS: 62 DEGREES
PH UR STRIP.AUTO: 6 [PH] (ref 4.5–8)
PLATELET # BLD AUTO: 304 THOUSANDS/UL (ref 149–390)
PMV BLD AUTO: 9.8 FL (ref 8.9–12.7)
POTASSIUM SERPL-SCNC: 3.5 MMOL/L (ref 3.5–5.3)
PR INTERVAL: 148 MS
PR INTERVAL: 170 MS
PROT SERPL-MCNC: 7.7 G/DL (ref 6.4–8.2)
PROT UR STRIP-MCNC: ABNORMAL MG/DL
QRS AXIS: 61 DEGREES
QRS AXIS: 66 DEGREES
QRSD INTERVAL: 88 MS
QRSD INTERVAL: 92 MS
QT INTERVAL: 342 MS
QT INTERVAL: 394 MS
QTC INTERVAL: 387 MS
QTC INTERVAL: 402 MS
RBC # BLD AUTO: 4.57 MILLION/UL (ref 3.81–5.12)
RBC #/AREA URNS AUTO: ABNORMAL /HPF
SODIUM SERPL-SCNC: 140 MMOL/L (ref 136–145)
SP GR UR STRIP.AUTO: 1.01 (ref 1–1.03)
T WAVE AXIS: 50 DEGREES
T WAVE AXIS: 63 DEGREES
TROPONIN I SERPL-MCNC: <0.02 NG/ML
TROPONIN I SERPL-MCNC: <0.02 NG/ML
UROBILINOGEN UR QL STRIP.AUTO: 0.2 E.U./DL
VENTRICULAR RATE: 58 BPM
VENTRICULAR RATE: 83 BPM
WBC # BLD AUTO: 9.97 THOUSAND/UL (ref 4.31–10.16)
WBC #/AREA URNS AUTO: ABNORMAL /HPF

## 2018-07-10 PROCEDURE — 74176 CT ABD & PELVIS W/O CONTRAST: CPT

## 2018-07-10 PROCEDURE — 85025 COMPLETE CBC W/AUTO DIFF WBC: CPT | Performed by: PHYSICIAN ASSISTANT

## 2018-07-10 PROCEDURE — 96375 TX/PRO/DX INJ NEW DRUG ADDON: CPT

## 2018-07-10 PROCEDURE — 99285 EMERGENCY DEPT VISIT HI MDM: CPT

## 2018-07-10 PROCEDURE — 80053 COMPREHEN METABOLIC PANEL: CPT | Performed by: PHYSICIAN ASSISTANT

## 2018-07-10 PROCEDURE — 71046 X-RAY EXAM CHEST 2 VIEWS: CPT

## 2018-07-10 PROCEDURE — 81001 URINALYSIS AUTO W/SCOPE: CPT

## 2018-07-10 PROCEDURE — 84484 ASSAY OF TROPONIN QUANT: CPT | Performed by: PHYSICIAN ASSISTANT

## 2018-07-10 PROCEDURE — 96374 THER/PROPH/DIAG INJ IV PUSH: CPT

## 2018-07-10 PROCEDURE — 93010 ELECTROCARDIOGRAM REPORT: CPT | Performed by: INTERNAL MEDICINE

## 2018-07-10 PROCEDURE — 96361 HYDRATE IV INFUSION ADD-ON: CPT

## 2018-07-10 PROCEDURE — 36415 COLL VENOUS BLD VENIPUNCTURE: CPT | Performed by: PHYSICIAN ASSISTANT

## 2018-07-10 PROCEDURE — 81025 URINE PREGNANCY TEST: CPT | Performed by: PHYSICIAN ASSISTANT

## 2018-07-10 PROCEDURE — 93005 ELECTROCARDIOGRAM TRACING: CPT

## 2018-07-10 PROCEDURE — 83690 ASSAY OF LIPASE: CPT | Performed by: PHYSICIAN ASSISTANT

## 2018-07-10 RX ORDER — KETOROLAC TROMETHAMINE 30 MG/ML
30 INJECTION, SOLUTION INTRAMUSCULAR; INTRAVENOUS ONCE
Status: COMPLETED | OUTPATIENT
Start: 2018-07-10 | End: 2018-07-10

## 2018-07-10 RX ORDER — TAMSULOSIN HYDROCHLORIDE 0.4 MG/1
0.4 CAPSULE ORAL ONCE
Status: COMPLETED | OUTPATIENT
Start: 2018-07-10 | End: 2018-07-10

## 2018-07-10 RX ORDER — MORPHINE SULFATE 4 MG/ML
4 INJECTION, SOLUTION INTRAMUSCULAR; INTRAVENOUS ONCE
Status: COMPLETED | OUTPATIENT
Start: 2018-07-10 | End: 2018-07-10

## 2018-07-10 RX ORDER — TRAMADOL HYDROCHLORIDE 50 MG/1
50 TABLET ORAL EVERY 6 HOURS PRN
Qty: 12 TABLET | Refills: 0 | Status: SHIPPED | OUTPATIENT
Start: 2018-07-10 | End: 2018-07-13

## 2018-07-10 RX ORDER — NAPROXEN 500 MG/1
500 TABLET ORAL 2 TIMES DAILY WITH MEALS
Qty: 30 TABLET | Refills: 0 | Status: SHIPPED | OUTPATIENT
Start: 2018-07-10 | End: 2018-09-19 | Stop reason: ALTCHOICE

## 2018-07-10 RX ORDER — TAMSULOSIN HYDROCHLORIDE 0.4 MG/1
0.4 CAPSULE ORAL
Qty: 7 CAPSULE | Refills: 0 | Status: SHIPPED | OUTPATIENT
Start: 2018-07-10 | End: 2018-09-19 | Stop reason: ALTCHOICE

## 2018-07-10 RX ADMIN — TAMSULOSIN HYDROCHLORIDE 0.4 MG: 0.4 CAPSULE ORAL at 14:12

## 2018-07-10 RX ADMIN — KETOROLAC TROMETHAMINE 30 MG: 30 INJECTION, SOLUTION INTRAMUSCULAR at 14:12

## 2018-07-10 RX ADMIN — MORPHINE SULFATE 4 MG: 4 INJECTION INTRAVENOUS at 11:31

## 2018-07-10 RX ADMIN — SODIUM CHLORIDE 1000 ML: 0.9 INJECTION, SOLUTION INTRAVENOUS at 11:07

## 2018-07-10 NOTE — ED PROVIDER NOTES
History  Chief Complaint   Patient presents with    Syncope     Pt  assisted in from parking lot via stretcher, pt  passed out, daughter prevented pt  from falling and hitting head  Pt  was unresponsive to sternal rub at first, but had a faint radial pulse  Daughter was bringing pt  to ER for left upper quadrant pain  20-year-old female with past medical history of left renal stone, who presents to the emergency department for left upper quadrant abdominal pain and syncope  Patient reports that she developed left upper quadrant abdominal pain acutely this morning while at rest  Describes the pain as a 10/10 stabbing and sharp pain that is localized to the right upper quadrant  He endorses nausea, denies any flank pain, vomiting, diarrhea, urinary pain/frequency/urgency, hematuria prior to arrival   Denies any fevers or chills  While patient was on her way to the emergency department, she felt lightheaded and experienced a near syncopal episode  She is able to sit down and lie down prior to experiencing the episode  Denies any head strike or loss of consciousness  She was found on the floor in front of the emergency department and was transferred to a stretcher and brought into the ER  Patient currently denies chest pain, shortness breath, palpitations  Patient has previous episodes of syncope, for which she has been worked up using echocardiogram, Holter monitor, MRI of the heart, with normal results thus far  She is currently able to answer yes and no questions in the ED during exam         History provided by:  Patient, medical records and relative   used: No    Syncope   Associated symptoms: nausea    Associated symptoms: no chest pain, no dizziness, no fever, no headaches, no palpitations, no shortness of breath, no vomiting and no weakness        Prior to Admission Medications   Prescriptions Last Dose Informant Patient Reported? Taking?    CRYSELLE-28 0 3-30 MG-MCG per tablet Yes No   Sig: TAKE 1 TABLET BY ORAL ROUTE EVERY DAY   benzoyl peroxide 5 % gel   No No   Sig: APPLY SPARINGLY TO THE AFFECTED AREA(S) ONCE OR TWICE DAILY AS DIRECTED    ibuprofen (MOTRIN) 800 mg tablet   No No   Sig: Take 1 tablet (800 mg total) by mouth every 8 (eight) hours as needed for mild pain or moderate pain Do not take on same day as taking meloxicam      Facility-Administered Medications: None       No past medical history on file  No past surgical history on file  Family History   Problem Relation Age of Onset    No Known Problems Mother     Diabetes Father     Diabetes Maternal Grandfather     Diabetes Paternal Grandfather     Cancer Paternal Aunt      I have reviewed and agree with the history as documented  Social History   Substance Use Topics    Smoking status: Never Smoker    Smokeless tobacco: Never Used    Alcohol use No        Review of Systems   Constitutional: Negative for chills and fever  HENT: Negative  Eyes: Negative  Respiratory: Negative for cough, chest tightness, shortness of breath and wheezing  Cardiovascular: Positive for syncope  Negative for chest pain, palpitations and leg swelling  Gastrointestinal: Positive for abdominal pain and nausea  Negative for constipation, diarrhea and vomiting  Genitourinary: Negative for dysuria, flank pain, frequency, hematuria and urgency  Musculoskeletal: Negative for arthralgias, back pain, gait problem, joint swelling, myalgias, neck pain and neck stiffness  Skin: Negative for color change, pallor, rash and wound  Neurological: Negative for dizziness, syncope, weakness, light-headedness, numbness and headaches  Psychiatric/Behavioral: Negative  Physical Exam  Physical Exam   Constitutional: She is oriented to person, place, and time  She appears well-developed and well-nourished  No distress  HENT:   Head: Normocephalic and atraumatic     Eyes: Conjunctivae and EOM are normal  Pupils are equal, round, and reactive to light  Neck: Normal range of motion  Neck supple  Cardiovascular: Normal rate, regular rhythm and intact distal pulses  Pulmonary/Chest: Effort normal and breath sounds normal  She has no wheezes  She has no rales  Abdominal: Soft  Bowel sounds are normal  She exhibits no distension  There is tenderness (Moderate TTP to the LUQ )  There is no rebound and no guarding  No CVA Tenderness bilaterally  Musculoskeletal: Normal range of motion  She exhibits no edema or tenderness  Neurological: She is alert and oriented to person, place, and time  No cranial nerve deficit or sensory deficit  She exhibits normal muscle tone  Coordination normal    Skin: Skin is warm and dry  Capillary refill takes less than 2 seconds  She is not diaphoretic  Psychiatric: She has a normal mood and affect  Her behavior is normal    Nursing note and vitals reviewed        Vital Signs  ED Triage Vitals   Temperature Pulse Respirations Blood Pressure SpO2   07/10/18 1058 07/10/18 1051 07/10/18 1051 07/10/18 1051 07/10/18 1051   97 8 °F (36 6 °C) 91 18 132/84 99 %      Temp Source Heart Rate Source Patient Position - Orthostatic VS BP Location FiO2 (%)   07/10/18 1058 07/10/18 1051 07/10/18 1051 07/10/18 1051 --   Oral Monitor Lying Right arm       Pain Score       07/10/18 1131       Worst Possible Pain           Vitals:    07/10/18 1051 07/10/18 1100 07/10/18 1250 07/10/18 1430   BP: 132/84 132/84 119/67    Pulse: 91 84 64 64   Patient Position - Orthostatic VS: Lying  Lying        Visual Acuity      ED Medications  Medications   sodium chloride 0 9 % bolus 1,000 mL (0 mL Intravenous Stopped 7/10/18 1412)   morphine (PF) 4 mg/mL injection 4 mg (4 mg Intravenous Given 7/10/18 1131)   tamsulosin (FLOMAX) capsule 0 4 mg (0 4 mg Oral Given 7/10/18 1412)   ketorolac (TORADOL) injection 30 mg (30 mg Intravenous Given 7/10/18 1412)       Diagnostic Studies  Results Reviewed     Procedure Component Value Units Date/Time    Troponin I [[id number]]  (Normal) Collected:  07/10/18 1415    Lab Status:  Final result Specimen:  Blood from Arm, Left Updated:  07/10/18 1440     Troponin I <0 02 ng/mL     Urine Microscopic [[id number]]  (Abnormal) Collected:  07/10/18 1255    Lab Status:  Final result Specimen:  Urine from Urine, Clean Catch Updated:  07/10/18 1305     RBC, UA 20-30 (A) /hpf      WBC, UA 0-1 (A) /hpf      Epithelial Cells Occasional /hpf      Bacteria, UA Occasional /hpf     POCT pregnancy, urine [[id number]]  (Normal) Resulted:  07/10/18 1249    Lab Status:  Final result Updated:  07/10/18 1249     EXT PREG TEST UR (Ref: Negative) Negative    ED Urine Macroscopic [[id number]]  (Abnormal) Collected:  07/10/18 1255    Lab Status:  Final result Specimen:  Urine Updated:  07/10/18 1249     Color, UA Yellow     Clarity, UA Clear     pH, UA 6 0     Leukocytes, UA Trace (A)     Nitrite, UA Negative     Protein, UA Trace (A) mg/dl      Glucose, UA Negative mg/dl      Ketones, UA Negative mg/dl      Urobilinogen, UA 0 2 E U /dl      Bilirubin, UA Negative     Blood, UA Large (A)     Specific [place], UA 1 010    Narrative:       CLINITEK RESULT    Troponin I [[id number]]  (Normal) Collected:  07/10/18 1107    Lab Status:  Final result Specimen:  Blood from Arm, Right Updated:  07/10/18 1130     Troponin I <0 02 ng/mL     Comprehensive metabolic panel [[id number]] Collected:  07/10/18 1107    Lab Status:  Final result Specimen:  Blood from Arm, Right Updated:  07/10/18 1128     Sodium 140 mmol/L      Potassium 3 5 mmol/L      Chloride 104 mmol/L      CO2 24 mmol/L      Anion Gap 12 mmol/L      BUN 11 mg/dL      Creatinine 1 01 mg/dL      Glucose 100 mg/dL      Calcium 9 0 mg/dL      AST 17 U/L      ALT 20 U/L      Alkaline Phosphatase 68 U/L      Total Protein 7 7 g/dL      Albumin 3 6 g/dL      Total Bilirubin 0 40 mg/dL      eGFR 92 ml/min/1 73sq m     Narrative:         National Kidney Disease Education Program recommendations are as follows:  GFR calculation is accurate only with a steady state creatinine  Chronic Kidney disease less than 60 ml/min/1 73 sq  meters  Kidney failure less than 15 ml/min/1 73 sq  meters  Lipase [[id number]]  (Normal) Collected:  07/10/18 1107    Lab Status:  Final result Specimen:  Blood from Arm, Right Updated:  07/10/18 1128     Lipase 128 u/L     CBC and differential [[id number]]  (Normal) Collected:  07/10/18 1107    Lab Status:  Final result Specimen:  Blood from Arm, Right Updated:  07/10/18 1114     WBC 9 97 Thousand/uL      RBC 4 57 Million/uL      Hemoglobin 13 8 g/dL      Hematocrit 41 2 %      MCV 90 fL      MCH 30 2 pg      MCHC 33 5 g/dL      RDW 14 2 %      MPV 9 8 fL      Platelets 656 Thousands/uL      Neutrophils Relative 63 %      Lymphocytes Relative 30 %      Monocytes Relative 7 %      Eosinophils Relative 1 %      Basophils Relative 0 %      Neutrophils Absolute 6 27 Thousands/µL      Lymphocytes Absolute 2 98 Thousands/µL      Monocytes Absolute 0 65 Thousand/µL      Eosinophils Absolute 0 05 Thousand/µL      Basophils Absolute 0 02 Thousands/µL                  CT abdomen pelvis wo contrast   Final Result by Noralee Hodgkin, MD (07/10 1324)      Punctate proximal left ureteral calculus with minimal hydroureteronephrosis  Workstation performed: [id number]         XR chest 2 views   ED Interpretation by Bridget Chisholm PA-C (07/10 1202)   No acute cardiopulmonary disease  Final Result by Christie Verde MD (07/10 1212)      No acute cardiopulmonary disease              Workstation performed: [id number]                    Procedures  ECG 12 Lead Documentation  Date/Time: 7/10/2018 10:54 AM  Performed by: Chance Black  Authorized by: Lady Boykin     Indications / Diagnosis:  Near syncope  ECG reviewed by me, the ED Provider: yes    Patient location:  ED  Previous ECG:     Previous ECG:  Compared to current    Comparison ECG info:  7/10/17  Interpretation: Interpretation: normal    Rate:     ECG rate:  83    ECG rate assessment: normal    Rhythm:     Rhythm: sinus rhythm    Ectopy:     Ectopy: none    QRS:     QRS axis:  Normal    QRS intervals:  Normal  Conduction:     Conduction: normal    ST segments:     ST segments:  Normal  T waves:     T waves: normal    ECG 12 Lead Documentation  Date/Time: 7/10/2018 2:11 PM  Performed by: Nicola Mack  Authorized by: Catracho Grier     Indications / Diagnosis:  Near syncope  ECG reviewed by me, the ED Provider: no    Patient location:  ED  Previous ECG:     Previous ECG:  Compared to current    Comparison ECG info:  7/10/17 at 1054  Interpretation:     Interpretation: normal    Rate:     ECG rate:  58    ECG rate assessment: normal    Rhythm:     Rhythm: sinus bradycardia    Ectopy:     Ectopy: none    QRS:     QRS axis:  Normal  Conduction:     Conduction: normal    ST segments:     ST segments:  Normal  T waves:     T waves: normal             Phone Contacts  ED Phone Contact    ED Course  ED Course as of Jul 10 1611   Tue Jul 10, 2018   1236 Patient reevaluated and feels much improved in pain  Sleeping comfortably  Pending CT scan  Other results discussed with patient and family at bedside  1354 Discussed results with the patient  1413 Pending trop and EKG #2  CT results discussed with patient  Ordered toradol and flomax  1521 Trop #2 and EKG unremarkable  Dc home  MDM  Number of Diagnoses or Management Options  Near syncope:   Renal stone:   Diagnosis management comments: 73-year-old female with past medical history of left renal stone, who presents to the emergency department for left upper quadrant abdominal pain and syncope  Differential Diagnosis includes but is not limited to: renal stone, gastritis, GERD, ACS, pyelonephritis, UTI, pregnancy  UPT negative  UA shows blood, trace leuks (WBC 1-2) and protein  Trop x2 negative  EKG x2   CXR no acute cardiopulmonary disease  Labs generally unremarkable  Normal renal function  CT AP shows punctate renal stone proximal to the left kidney with mild hydroureteralnephrosis  Pain improved with morphine  Given additional toradol and flomax in the ED  Dc home with PMD follow up for ACS and renal stone  Pt re-examined and evaluated after testing and treatment  Spoke with the patient and feeling improved and sxs have resolved  Will discharge home with close f/u with pcp and instructed to return to the ED if sxs worsen or continue  Pt agrees with the plan for discharge and feels comfortable to go home with proper f/u  Advised to return for worsening or additional problems  Diagnostic tests were reviewed and questions answered  Diagnosis, care plan and treatment options were discussed  The patient understand instructions and will follow up as directed  Amount and/or Complexity of Data Reviewed  Clinical lab tests: ordered and reviewed  Tests in the radiology section of CPT®: ordered and reviewed  Independent visualization of images, tracings, or specimens: yes      CritCare Time    Disposition  Final diagnoses:   Near syncope   Renal stone     Time reflects when diagnosis was documented in both MDM as applicable and the Disposition within this note     Time User Action Codes Description Comment    7/10/2018  3:16 PM Royden Estefani Add [R55] Near syncope     7/10/2018  3:17 PM Mishel Long Add [N20 0] Renal stone     7/10/2018  3:17 PM Royden Estefani Modify [R55] Near syncope     7/10/2018  3:17 PM Royden Estefani Modify [N20 0] Renal stone       ED Disposition     ED Disposition Condition Comment    Discharge  [facility] discharge to home/self care      Condition at discharge: Good        Follow-up Information     Follow up With Specialties Details Why Contact Info    MADDIE Mcgowan Nurse Practitioner In 1 week  [place] [address]  [phone]            Discharge Medication List as of 7/10/2018  3:21 PM START taking these medications    Details   naproxen (NAPROSYN) 500 mg tablet Take 1 tablet (500 mg total) by mouth 2 (two) times a day with meals, Starting Tue 7/10/2018, Print      tamsulosin (FLOMAX) 0 4 mg Take 1 capsule (0 4 mg total) by mouth daily with dinner for 7 days, Starting Tue 7/10/2018, Until Tue 7/17/2018, Print      traMADol (ULTRAM) 50 mg tablet Take 1 tablet (50 mg total) by mouth every 6 (six) hours as needed for moderate pain or severe pain for up to 3 days, Starting Tue 7/10/2018, Until Fri 7/13/2018, Print         CONTINUE these medications which have NOT CHANGED    Details   benzoyl peroxide 5 % gel APPLY SPARINGLY TO THE AFFECTED AREA(S) ONCE OR TWICE DAILY AS DIRECTED , Normal      CRYSELLE-28 0 3-30 MG-MCG per tablet TAKE 1 TABLET BY ORAL ROUTE EVERY DAY, Historical Med      ibuprofen (MOTRIN) 800 mg tablet Take 1 tablet (800 mg total) by mouth every 8 (eight) hours as needed for mild pain or moderate pain Do not take on same day as taking meloxicam, Starting Thu 4/12/2018, Print           No discharge procedures on file      ED Provider  Electronically Signed by           Maria T Adam PA-C  07/10/18 4409

## 2018-07-10 NOTE — ED NOTES
Reviewed d/c instructions with pt  Pt states she has no questions at this time        Esthela Simmons RN  07/10/18 9700

## 2018-07-10 NOTE — DISCHARGE INSTRUCTIONS
Kidney Stones   WHAT YOU NEED TO KNOW:   Kidney stones form in the urinary system when the water and waste in your urine are out of balance  When this happens, certain types of waste crystals separate from the urine  The crystals build up and form kidney stones  You may have 1 or more kidney stones  DISCHARGE INSTRUCTIONS:   Return to the emergency department if:   · You have vomiting that is not relieved by medicine  Contact your healthcare provider if:   · You have a fever  · You have trouble passing urine  · You see blood in your urine  · You have severe pain  · You have any questions or concerns about your condition or care  Medicines:   · NSAIDs , such as ibuprofen, help decrease swelling, pain, and fever  This medicine is available with or without a doctor's order  NSAIDs can cause stomach bleeding or kidney problems in certain people  If you take blood thinner medicine, always ask your healthcare provider if NSAIDs are safe for you  Always read the medicine label and follow directions  · Prescription medicine  may be given  Ask how to take this medicine safely  · Medicines  to balance your electrolytes may be needed  · Take your medicine as directed  Contact your healthcare provider if you think your medicine is not helping or if you have side effects  Tell him or her if you are allergic to any medicine  Keep a list of the medicines, vitamins, and herbs you take  Include the amounts, and when and why you take them  Bring the list or the pill bottles to follow-up visits  Carry your medicine list with you in case of an emergency  Follow up with your healthcare provider as directed: You may need to return for more tests  Write down your questions so you remember to ask them during your visits  Self-care:   · Drink plenty of liquids  Your healthcare provider may tell you to drink at least 8 to 12 (eight-ounce) cups of liquids each day   This helps flush out the kidney stones when you urinate  Water is the best liquid to drink  · Strain your urine every time you go to the bathroom  Urinate through a strainer or a piece of thin cloth to catch the stones  Take the stones to your healthcare provider so they can be sent to the lab for tests  This will help your healthcare providers plan the best treatment for you  · Eat a variety of healthy foods  Healthy foods include fruits, vegetables, whole-grain breads, low-fat dairy products, beans, and fish  You may need to limit how much sodium (salt) or protein you eat  Ask for information about the best foods for you  · Stay active  Your stones may pass more easily by if you stay active  Ask about the best activities for you  After you pass your kidney stones:  Once you have passed your kidney stones, your healthcare provider may  order a 24-hour urine test  Results from a 24-hour urine test will help your healthcare provider plan ways to prevent more stones from forming  If you are told to do a 24-hour test, your healthcare provider will give you more instructions  © 2017 [address] [facility] Information is for End User's use only and may not be sold, redistributed or otherwise used for commercial purposes  All illustrations and images included in CareNotes® are the copyrighted property of A D A M [employer]  or [employer]  The above information is an  only  It is not intended as medical advice for individual conditions or treatments  Talk to your doctor, nurse or pharmacist before following any medical regimen to see if it is safe and effective for you  Syncope   WHAT YOU NEED TO KNOW:   Syncope is also called fainting or passing out  Syncope is a sudden, temporary loss of consciousness, followed by a fall from a standing or sitting position  Syncope ranges from not serious to a sign of a more serious condition that needs to be treated   You can control some health conditions that cause syncope  Your healthcare providers can help you create a plan to manage syncope and prevent episodes  DISCHARGE INSTRUCTIONS:   Seek care immediately if:   · You are bleeding because you hit your head when you fainted  · You suddenly have double vision, difficulty speaking, numbness, and cannot move your arms or legs  · You have chest pain and trouble breathing  · You vomit blood or material that looks like coffee grounds  · You see blood in your bowel movement  Contact your healthcare provider if:   · You have new or worsening symptoms  · You have another syncope episode  · You have a headache, fast heartbeat, or feel too dizzy to stand up  · You have questions or concerns about your condition or care  Follow up with your healthcare provider as directed:  Write down your questions so you remember to ask them during your visits  Manage syncope:   · Keep a record of your syncope episodes  Include your symptoms and your activity before and after the episode  The record can help your healthcare provider find the cause of your syncope and help you manage episodes  · Sit or lie down when needed  This includes when you feel dizzy, your throat is getting tight, and your vision changes  Raise your legs above the level of your heart  · Take slow, deep breaths if you start to breathe faster with anxiety or fear  This can help decrease dizziness and the feeling that you might faint  · Check your blood pressure often  This is important if you take medicine to lower your blood pressure  Check your blood pressure when you are lying down and when you are standing  Ask how often to check during the day  Keep a record of your blood pressure numbers  Your healthcare provider may use the record to help plan your treatment  Prevent a syncope episode:   · Move slowly and let yourself get used to one position before you move to another position    This is very important when you change from a lying or sitting position to a standing position  Take some deep breaths before you stand up from a lying position  Stand up slowly  Sudden movements may cause a fainting spell  Sit on the side of the bed or couch for a few minutes before you stand up  If you are on bedrest, try to be upright for about 2 hours each day, or as directed  Do not lock your legs if you are standing for a long period of time  Move your legs and bend your knees to keep blood flowing  · Follow your healthcare provider's recommendations  Your provider may  recommend that you drink more liquids to prevent dehydration  You may also need to have more salt to keep your blood pressure from dropping too low and causing syncope  Your provider will tell you how much liquid and sodium to have each day  · Watch for signs of low blood sugar  These include hunger, nervousness, sweating, and fast or fluttery heartbeats  Talk with your healthcare provider about ways to keep your blood sugar level steady  · Do not strain if you are constipated  You may faint if you strain to have a bowel movement  Walking is the best way to get your bowels moving  Eat foods high in fiber to make it easier to have a bowel movement  Good examples are high-fiber cereals, beans, vegetables, and whole-grain breads  Prune juice may help make bowel movements softer  · Be careful in hot weather  Heat can cause a syncope episode  Limit activity done outside on hot days  Physical activity in hot weather can lead to dehydration  This can cause an episode  © 2017 [address] [employer]  Information is for End User's use only and may not be sold, redistributed or otherwise used for commercial purposes  All illustrations and images included in CareNotes® are the copyrighted property of [employer]  or Nito Gamble  The above information is an  only  It is not intended as medical advice for individual conditions or treatments   Talk to your doctor, nurse or pharmacist before following any medical regimen to see if it is safe and effective for you

## 2018-07-11 NOTE — PROGRESS NOTES
Cardiology Outpatient Follow up Note    Karan Escobedo 25 y o  female MRN: [id number]    07/12/18          Assessment/Plan:    1  Recurrent syncope  Appears to be vagally mediated in setting of pain, as well as episodes of orthostasis  All cardiac testing thus far, including echo, cardiac MRI, and Holter, has been negative  Does not appear to be cardiac in origin  Would not recommend event monitor or loop recorder at this time, as all episodes had premonitory symptoms and were in classic settings of orthostasis/vasovagal syncope, so event monitor likely to be of low yield  Advised hydration, treatment of pain from renal stone  1  Vasovagal syncope         HPI: 25year old woman who is here for follow up of recurrent syncope  Holter monitor in 5/18 was normal, but echo showed RV mildly dilated with low normal function, with moderate TR and normal PASP  It was recommended she have cardiac MRI for evaluation of RV for ARVD  Cardiac MRI was done 6/18/18, and showed normal RV size and function, no signs of ARVD  She was seen in ED 7/10/18 for flank pain, found to have kidney stone by CT  She had an episode of near syncope in setting of pain  Was treated with IVF  She reports she is taking Tramadol for pain, but doesn't feel it is working yet         Patient Active Problem List   Diagnosis    Acne vulgaris    Back pain    Family planning    Depression    Keratosis pilaris    Obesity    Pelvic pain    Chronic pain of right ankle    Vasovagal syncope    Depression with anxiety    Dysmenorrhea    Health maintenance examination       No Known Allergies      Current Outpatient Prescriptions:     benzoyl peroxide 5 % gel, APPLY SPARINGLY TO THE AFFECTED AREA(S) ONCE OR TWICE DAILY AS DIRECTED , Disp: 60 g, Rfl: 1    CRYSELLE-28 0 3-30 MG-MCG per tablet, TAKE 1 TABLET BY ORAL ROUTE EVERY DAY, Disp: , Rfl: 4    ibuprofen (MOTRIN) 800 mg tablet, Take 1 tablet (800 mg total) by mouth every 8 (eight) hours as needed for mild pain or moderate pain Do not take on same day as taking meloxicam, Disp: 30 tablet, Rfl: 1    naproxen (NAPROSYN) 500 mg tablet, Take 1 tablet (500 mg total) by mouth 2 (two) times a day with meals, Disp: 30 tablet, Rfl: 0    tamsulosin (FLOMAX) 0 4 mg, Take 1 capsule (0 4 mg total) by mouth daily with dinner for 7 days, Disp: 7 capsule, Rfl: 0    traMADol (ULTRAM) 50 mg tablet, Take 1 tablet (50 mg total) by mouth every 6 (six) hours as needed for moderate pain or severe pain for up to 3 days, Disp: 12 tablet, Rfl: 0    FLUoxetine (PROzac) 20 mg capsule, daily, Disp: , Rfl: 0    hydrOXYzine pamoate (VISTARIL) 25 mg capsule, TAKE 1 CAP AT 8AM , THEN 1 CAP AT 5PM, Disp: , Rfl: 0    meloxicam (MOBIC) 15 mg tablet, Take 15 mg by mouth daily as needed for moderate pain, Disp: , Rfl: 0    valACYclovir (VALTREX) 500 mg tablet, TAKE 1 TABLET BY ORAL ROUTE 2 TIMES EVERY DAY AS NEEDED FOR FUTURE OUTBREAKS, Disp: , Rfl: 5    Past Medical History:   Diagnosis Date    Chest pain     Palpitations     Syncope        Family History   Problem Relation Age of Onset    No Known Problems Mother     Diabetes Father     Diabetes Maternal Grandfather     Diabetes Paternal Grandfather     Cancer Paternal Aunt        No past surgical history on file  Social History     Social History    Marital status: Single     Spouse name: N/A    Number of children: N/A    Years of education: N/A     Occupational History    college student      Social History Main Topics    Smoking status: Never Smoker    Smokeless tobacco: Never Used    Alcohol use No    Drug use: No    Sexual activity: Not on file     Other Topics Concern    Not on file     Social History Narrative    Always uses seat belt    College student     ancestry    Lives with parents, and 2 brothers  No smokers  Pt reports feeling safe at home       Primary spoken language English    Unemployed            Review of Systems Constitution: Negative for chills, decreased appetite, diaphoresis, fever, weakness, malaise/fatigue, night sweats, weight gain and weight loss  HENT: Negative for ear pain, hearing loss, hoarse voice, nosebleeds, sore throat and tinnitus  Eyes: Negative for blurred vision and pain  Cardiovascular: Negative  Negative for chest pain, claudication, cyanosis, dyspnea on exertion, irregular heartbeat, leg swelling, near-syncope, orthopnea, palpitations, paroxysmal nocturnal dyspnea and syncope  Respiratory: Negative for cough, hemoptysis, shortness of breath, sleep disturbances due to breathing, snoring, sputum production and wheezing  Hematologic/Lymphatic: Negative for adenopathy and bleeding problem  Does not bruise/bleed easily  Skin: Negative for color change, dry skin, flushing, itching, poor wound healing and rash  Musculoskeletal: Negative for arthritis, back pain, falls, joint pain, muscle cramps, muscle weakness, myalgias and neck pain  Gastrointestinal: Negative for abdominal pain, constipation, diarrhea, dysphagia, heartburn, hematemesis, hematochezia, melena, nausea and vomiting  Genitourinary: Positive for flank pain  Negative for dysuria, frequency, hematuria, hesitancy, non-menstrual bleeding and urgency  Neurological: Positive for light-headedness  Negative for excessive daytime sleepiness, dizziness, focal weakness, headaches, loss of balance, numbness, paresthesias, tremors and vertigo  Psychiatric/Behavioral: Negative for altered mental status, depression and memory loss  The patient does not have insomnia and is not nervous/anxious  Allergic/Immunologic: Negative for environmental allergies and persistent infections  Vitals: /70 (BP Location: Right arm, Patient Position: Sitting, Cuff Size: Large)   Pulse 87   Ht 5' 7" (1 702 m)   Wt 110 kg (242 lb 3 2 oz)   BMI 37 93 kg/m²       Physical Exam:     GEN: Alert and oriented x 3, in no acute distress    Well appearing and well nourished  HEENT: Sclera anicteric, conjunctivae pink, mucous membranes moist  Oropharynx clear  NECK: Supple, no carotid bruits, no significant JVD  Trachea midline, no thyromegaly  HEART: Regular rhythm, normal S1 and S2, no murmurs, clicks, gallops or rubs  PMI nondisplaced, no thrills  LUNGS: Clear to auscultation bilaterally; no wheezes, rales, or rhonchi  No increased work of breathing or signs of respiratory distress  ABDOMEN: Soft, nontender, nondistended, normoactive bowel sounds  EXTREMITIES: Skin warm and well perfused, no clubbing, cyanosis, or edema  NEURO: No focal findings  Normal gait  Normal speech  Mood and affect normal    SKIN: Normal without suspicious lesions on exposed skin        Lab Results:       Lab Results   Component Value Date    HGBA1C 5 4 08/19/2014     Lab Results   Component Value Date    CHOL 193 08/19/2014     Lab Results   Component Value Date    HDL 42 08/19/2014     Lab Results   Component Value Date    LDLCALC 122 (H) 08/19/2014     Lab Results   Component Value Date    TRIG 146 08/19/2014     No components found for: CHOLHDL

## 2018-07-12 ENCOUNTER — OFFICE VISIT (OUTPATIENT)
Dept: FAMILY MEDICINE CLINIC | Facility: CLINIC | Age: 23
End: 2018-07-12
Payer: COMMERCIAL

## 2018-07-12 ENCOUNTER — OFFICE VISIT (OUTPATIENT)
Dept: CARDIOLOGY CLINIC | Facility: CLINIC | Age: 23
End: 2018-07-12
Payer: COMMERCIAL

## 2018-07-12 VITALS
SYSTOLIC BLOOD PRESSURE: 116 MMHG | WEIGHT: 242.4 LBS | HEIGHT: 67 IN | TEMPERATURE: 97.9 F | BODY MASS INDEX: 38.04 KG/M2 | RESPIRATION RATE: 16 BRPM | HEART RATE: 78 BPM | DIASTOLIC BLOOD PRESSURE: 70 MMHG

## 2018-07-12 VITALS
HEIGHT: 67 IN | HEART RATE: 87 BPM | DIASTOLIC BLOOD PRESSURE: 70 MMHG | WEIGHT: 242.2 LBS | BODY MASS INDEX: 38.01 KG/M2 | SYSTOLIC BLOOD PRESSURE: 112 MMHG

## 2018-07-12 DIAGNOSIS — R55 VASOVAGAL SYNCOPE: Primary | ICD-10-CM

## 2018-07-12 DIAGNOSIS — N23 RENAL COLIC ON LEFT SIDE: ICD-10-CM

## 2018-07-12 DIAGNOSIS — N20.0 NEPHROLITHIASIS: Primary | ICD-10-CM

## 2018-07-12 DIAGNOSIS — L70.0 ACNE VULGARIS: ICD-10-CM

## 2018-07-12 PROCEDURE — 99214 OFFICE O/P EST MOD 30 MIN: CPT | Performed by: INTERNAL MEDICINE

## 2018-07-12 PROCEDURE — 99213 OFFICE O/P EST LOW 20 MIN: CPT | Performed by: NURSE PRACTITIONER

## 2018-07-12 RX ORDER — HYDROCODONE BITARTRATE AND ACETAMINOPHEN 5; 325 MG/1; MG/1
1 TABLET ORAL EVERY 6 HOURS PRN
Qty: 12 TABLET | Refills: 0 | Status: SHIPPED | OUTPATIENT
Start: 2018-07-12 | End: 2018-09-19 | Stop reason: ALTCHOICE

## 2018-07-12 RX ORDER — HYDROXYZINE PAMOATE 25 MG/1
CAPSULE ORAL
Refills: 0 | COMMUNITY
Start: 2018-06-12 | End: 2018-07-12 | Stop reason: ALTCHOICE

## 2018-07-12 RX ORDER — FLUOXETINE HYDROCHLORIDE 20 MG/1
CAPSULE ORAL DAILY
Refills: 0 | COMMUNITY
Start: 2018-06-12 | End: 2018-09-19 | Stop reason: SDUPTHER

## 2018-07-12 RX ORDER — MELOXICAM 15 MG/1
15 TABLET ORAL DAILY PRN
Refills: 0 | COMMUNITY
Start: 2018-05-05 | End: 2018-07-12 | Stop reason: ALTCHOICE

## 2018-07-12 RX ORDER — VALACYCLOVIR HYDROCHLORIDE 500 MG/1
TABLET, FILM COATED ORAL
Refills: 5 | COMMUNITY
Start: 2018-05-14 | End: 2020-03-10

## 2018-07-12 NOTE — ASSESSMENT & PLAN NOTE
Left  ED evaluation yesterday  Taking flomax, ultram and naproxen but pain 9/10  Staining urine  No red flags  Use hydrocodone/Acetaminophen 1 tab QID prn for pain 7 or above, use ultram for pain less than 7 and do not use both at the same time  Precautions for RTO/Call/ED discussed   And handout given

## 2018-07-12 NOTE — PROGRESS NOTES
Angi Arechiga 1995 female MRN: [id number]    Family Medicine Follow-up Visit    ASSESSMENT/PLAN  Problem List Items Addressed This Visit     Acne vulgaris    Relevant Medications    benzoyl peroxide 5 % gel    Nephrolithiasis - Primary     Left  ED evaluation yesterday  Taking flomax, ultram and naproxen but pain 9/10  Staining urine  No red flags  Use hydrocodone/Acetaminophen 1 tab QID prn for pain 7 or above, use ultram for pain less than 7 and do not use both at the same time  Precautions for RTO/Call/ED discussed  And handout given           Relevant Medications    HYDROcodone-acetaminophen (NORCO) 5-325 mg per tablet      Other Visit Diagnoses     Renal colic on left side        Relevant Medications    HYDROcodone-acetaminophen (NORCO) 5-325 mg per tablet              No future appointments  SUBJECTIVE  CC: Follow-up      HPI:  Angi Arechiga is a 25 y o  female who presents for f/u urolithiasis  Pain remains left flank  Ultram not enough  9/10 pain  Straining urine no passage of stone, denies estelita hematuria, fever, malaise, N/V  Denies difficulty passing urine or reduced volume  Here with mom  Review of Systems   Constitutional: Negative  Genitourinary: Positive for flank pain  Negative for difficulty urinating and dysuria  Musculoskeletal: Negative for arthralgias and myalgias  All other systems reviewed and are negative  Historical Information   The patient history was reviewed as follows:    Past Medical History:   Diagnosis Date    Chest pain     Palpitations     Syncope      History reviewed  No pertinent surgical history    Family History   Problem Relation Age of Onset    No Known Problems Mother     Diabetes Father     Diabetes Maternal Grandfather     Diabetes Paternal Grandfather     Cancer Paternal Aunt       Social History   History   Alcohol Use No     History   Drug Use No     History   Smoking Status    Never Smoker   Smokeless Tobacco    Never Used Medications:     Current Outpatient Prescriptions:     benzoyl peroxide 5 % gel, Apply topically 2 (two) times a day, Disp: 60 g, Rfl: 3    CRYSELLE-28 0 3-30 MG-MCG per tablet, TAKE 1 TABLET BY ORAL ROUTE EVERY DAY, Disp: , Rfl: 4    FLUoxetine (PROzac) 20 mg capsule, daily, Disp: , Rfl: 0    naproxen (NAPROSYN) 500 mg tablet, Take 1 tablet (500 mg total) by mouth 2 (two) times a day with meals, Disp: 30 tablet, Rfl: 0    tamsulosin (FLOMAX) 0 4 mg, Take 1 capsule (0 4 mg total) by mouth daily with dinner for 7 days, Disp: 7 capsule, Rfl: 0    valACYclovir (VALTREX) 500 mg tablet, TAKE 1 TABLET BY ORAL ROUTE 2 TIMES EVERY DAY AS NEEDED FOR FUTURE OUTBREAKS, Disp: , Rfl: 5    HYDROcodone-acetaminophen (NORCO) 5-325 mg per tablet, Take 1 tablet by mouth every 6 (six) hours as needed for pain Do not use in combination with ultram (tramadol) use one or the other  Max Daily Amount: 4 tablets, Disp: 12 tablet, Rfl: 0  No Known Allergies    OBJECTIVE    Vitals:   Vitals:    07/12/18 1342   BP: 116/70   BP Location: Right arm   Patient Position: Sitting   Cuff Size: Large   Pulse: 78   Resp: 16   Temp: 97 9 °F (36 6 °C)   TempSrc: Tympanic   Weight: 110 kg (242 lb 6 4 oz)   Height: 5' 6 6" (1 692 m)           Physical Exam   Constitutional: She is oriented to person, place, and time  She appears well-developed and well-nourished  Appears uncomfortable, moves position    HENT:   Head: Normocephalic and atraumatic  Mouth/Throat: Oropharynx is clear and moist    Eyes: No scleral icterus  Neck: Neck supple  Cardiovascular: Normal rate, regular rhythm, normal heart sounds and intact distal pulses  Pulmonary/Chest: Effort normal and breath sounds normal    Abdominal: Bowel sounds are normal  She exhibits no distension  There is tenderness (left upper quadrant/flank)  Musculoskeletal: She exhibits no edema  Neurological: She is alert and oriented to person, place, and time     Skin: Skin is warm and dry  No rash noted                    Laila Blackwood, [address] [facility]   7/16/2018

## 2018-07-12 NOTE — PATIENT INSTRUCTIONS
Kidney Stones   AMBULATORY CARE:   Kidney stones  form in the urinary system when the water and waste in your urine are out of balance  When this happens, certain types of waste crystals separate from the urine  The crystals build up and form kidney stones  Kidney stones can be made of uric acid, calcium, phosphate, or oxalate crystals  You may have 1 or more kidney stones  Common symptoms include the following:   · Pain in the middle of your back that moves across to your side or that may spread to your groin    · Nausea and vomiting    · Urge to urinate often, burning feeling when you urinate, or pink or red urine    · Tenderness in your lower back, side, or stomach  Seek care immediately if:   · You have vomiting that is not relieved by medicine  Contact your healthcare provider if:   · You have a fever  · You have trouble passing urine  · You see blood in your urine  · You have severe pain  · You have any questions or concerns about your condition or care  Treatment for kidney stones  may include any of the following:  · NSAIDs , such as ibuprofen, help decrease swelling, pain, and fever  This medicine is available with or without a doctor's order  NSAIDs can cause stomach bleeding or kidney problems in certain people  If you take blood thinner medicine, always ask your healthcare provider if NSAIDs are safe for you  Always read the medicine label and follow directions  · Prescription medicine  may be given  Ask how to take this medicine safely  · Medicines  to balance your electrolytes may be needed  · A procedure or surgery  to remove the kidney stones may be needed if they do not pass on their own  Your treatment will depend on the size and location of your kidney stones  Manage your symptoms:   · Drink plenty of liquids  Your healthcare provider may tell you to drink at least 8 to 12 (eight-ounce) cups of liquids each day   This helps flush out the kidney stones when you urinate  Water is the best liquid to drink  · Strain your urine every time you go to the bathroom  Urinate through a strainer or a piece of thin cloth to catch the stones  Take the stones to your healthcare provider so they can be sent to the lab for tests  This will help your healthcare providers plan the best treatment for you  · Eat a variety of healthy foods  Healthy foods include fruits, vegetables, whole-grain breads, low-fat dairy products, beans, and fish  You may need to limit how much sodium (salt) or protein you eat  Ask for information about the best foods for you  · Exercise regularly  Your stones may pass more easily if you stay active  Ask about the best activities for you  After you pass your kidney stones:  Once you have passed your kidney stones, your healthcare provider may  order a 24-hour urine test  Results from a 24-hour urine test will help your healthcare provider plan ways to prevent more stones from forming  If you are told to do a 24-hour test, your healthcare provider will give you more instructions  Follow up with your healthcare provider as directed:  Write down your questions so you remember to ask them during your visits  © 2017 [facility] [facility] Information is for End User's use only and may not be sold, redistributed or otherwise used for commercial purposes  All illustrations and images included in CareNotes® are the copyrighted property of [employer] , [employer]  or Nito Gamble  The above information is an  only  It is not intended as medical advice for individual conditions or treatments  Talk to your doctor, nurse or pharmacist before following any medical regimen to see if it is safe and effective for you

## 2018-07-17 ENCOUNTER — TELEPHONE (OUTPATIENT)
Dept: FAMILY MEDICINE CLINIC | Facility: CLINIC | Age: 23
End: 2018-07-17

## 2018-09-19 ENCOUNTER — OFFICE VISIT (OUTPATIENT)
Dept: FAMILY MEDICINE CLINIC | Facility: CLINIC | Age: 23
End: 2018-09-19
Payer: COMMERCIAL

## 2018-09-19 VITALS
HEIGHT: 67 IN | WEIGHT: 243 LBS | HEART RATE: 82 BPM | TEMPERATURE: 98.5 F | SYSTOLIC BLOOD PRESSURE: 108 MMHG | BODY MASS INDEX: 38.14 KG/M2 | RESPIRATION RATE: 16 BRPM | DIASTOLIC BLOOD PRESSURE: 70 MMHG

## 2018-09-19 DIAGNOSIS — B00.9 HSV-2 INFECTION: ICD-10-CM

## 2018-09-19 DIAGNOSIS — J00 ACUTE RHINITIS: ICD-10-CM

## 2018-09-19 DIAGNOSIS — L70.0 ACNE VULGARIS: ICD-10-CM

## 2018-09-19 DIAGNOSIS — F41.8 DEPRESSION WITH ANXIETY: ICD-10-CM

## 2018-09-19 DIAGNOSIS — Z23 NEED FOR TDAP VACCINATION: Primary | ICD-10-CM

## 2018-09-19 DIAGNOSIS — Z23 NEED FOR INFLUENZA VACCINATION: ICD-10-CM

## 2018-09-19 PROCEDURE — 90472 IMMUNIZATION ADMIN EACH ADD: CPT

## 2018-09-19 PROCEDURE — 90686 IIV4 VACC NO PRSV 0.5 ML IM: CPT

## 2018-09-19 PROCEDURE — 90471 IMMUNIZATION ADMIN: CPT

## 2018-09-19 PROCEDURE — 99213 OFFICE O/P EST LOW 20 MIN: CPT | Performed by: NURSE PRACTITIONER

## 2018-09-19 PROCEDURE — 90715 TDAP VACCINE 7 YRS/> IM: CPT

## 2018-09-19 RX ORDER — HYDROXYZINE PAMOATE 25 MG/1
25 CAPSULE ORAL 3 TIMES DAILY PRN
COMMUNITY
End: 2018-09-19 | Stop reason: SDUPTHER

## 2018-09-19 RX ORDER — IPRATROPIUM BROMIDE 21 UG/1
2 SPRAY, METERED NASAL EVERY 12 HOURS
Qty: 30 ML | Refills: 0 | Status: SHIPPED | OUTPATIENT
Start: 2018-09-19 | End: 2019-06-18 | Stop reason: ALTCHOICE

## 2018-09-19 RX ORDER — HYDROXYZINE PAMOATE 25 MG/1
25 CAPSULE ORAL 3 TIMES DAILY PRN
Qty: 90 CAPSULE | Refills: 1 | Status: SHIPPED | OUTPATIENT
Start: 2018-09-19 | End: 2019-03-26 | Stop reason: SDUPTHER

## 2018-09-19 RX ORDER — FLUOXETINE HYDROCHLORIDE 20 MG/1
40 CAPSULE ORAL DAILY
Qty: 180 CAPSULE | Refills: 1 | Status: SHIPPED | OUTPATIENT
Start: 2018-09-19 | End: 2019-03-19 | Stop reason: SDUPTHER

## 2018-09-19 NOTE — PROGRESS NOTES
Peter Silva 1995 female MRN: [id number]    Family Medicine Follow-up Visit    ASSESSMENT/PLAN  Problem List Items Addressed This Visit     Acne vulgaris    Depression with anxiety     Controlled on current meds and counseling  Continue current meds   Follow up prn and 6-12 weeks         Relevant Medications    hydrOXYzine pamoate (VISTARIL) 25 mg capsule    FLUoxetine (PROzac) 20 mg capsule    HSV-2 infection    Relevant Medications    Lidocaine HCl 2 % LIQD      Other Visit Diagnoses     Need for Tdap vaccination    -  Primary    Relevant Orders    TDAP VACCINE GREATER THAN OR EQUAL TO 8YO IM (Completed)    Need for influenza vaccination        Relevant Orders    SYRINGE/SINGLE-DOSE VIAL: influenza vaccine, 1135-8390, quadrivalent, 0 5 mL, preservative-free, for patients 3+ yr (FLUZONE) (Completed)    Acute rhinitis        Relevant Medications    ipratropium (ATROVENT) 0 03 % nasal spray              Future Appointments  Date Time Provider Werner Higgins   10/31/2018 9:10 AM MADDIE Balbuena [facility]  [facility]          SUBJECTIVE  CC: Follow-up      HPI:  Petre Silva is a 25 y o  female who presents for follow up depression /anxiety  Hydroxyzine helping anxiety and depression symptoms controlled on prozac  Feels well just needs refills  Request lidocaine for prn use painful HSV 2 outbreaks  Was prescribed by Gyn  Review of Systems   Constitutional: Negative for fatigue, fever and unexpected weight change  Psychiatric/Behavioral: Negative for agitation, decreased concentration, dysphoric mood, hallucinations, self-injury, sleep disturbance and suicidal ideas  See HPI       Historical Information   The patient history was reviewed as follows:    Past Medical History:   Diagnosis Date    Chest pain     Palpitations     Syncope      No past surgical history on file    Family History   Problem Relation Age of Onset    No Known Problems Mother     Diabetes Father     Diabetes Maternal Grandfather     Diabetes Paternal Grandfather     Cancer Paternal Aunt       Social History   History   Alcohol Use No     History   Drug Use No     History   Smoking Status    Never Smoker   Smokeless Tobacco    Never Used       Medications:     Current Outpatient Prescriptions:     benzoyl peroxide 5 % gel, Apply topically 2 (two) times a day, Disp: 60 g, Rfl: 3    CRYSELLE-28 0 3-30 MG-MCG per tablet, TAKE 1 TABLET BY ORAL ROUTE EVERY DAY, Disp: , Rfl: 4    FLUoxetine (PROzac) 20 mg capsule, Take 2 capsules (40 mg total) by mouth daily, Disp: 180 capsule, Rfl: 1    hydrOXYzine pamoate (VISTARIL) 25 mg capsule, Take 1 capsule (25 mg total) by mouth 3 (three) times a day as needed for itching, Disp: 90 capsule, Rfl: 1    Lidocaine HCl 2 % LIQD, Apply 1 application topically daily as needed (pain), Disp: 1 Bottle, Rfl: 0    valACYclovir (VALTREX) 500 mg tablet, TAKE 1 TABLET BY ORAL ROUTE 2 TIMES EVERY DAY AS NEEDED FOR FUTURE OUTBREAKS, Disp: , Rfl: 5    ipratropium (ATROVENT) 0 03 % nasal spray, 2 sprays into each nostril every 12 (twelve) hours, Disp: 30 mL, Rfl: 0  No Known Allergies    OBJECTIVE    Vitals:   Vitals:    09/19/18 0910   BP: 108/70   Pulse: 82   Resp: 16   Temp: 98 5 °F (36 9 °C)   Weight: 110 kg (243 lb)   Height: 5' 6 6" (1 692 m)           Physical Exam   Constitutional: She appears well-developed and well-nourished  No distress  Psychiatric: She has a normal mood and affect                    Koko Mendez[address]   10/2/2018

## 2018-11-14 ENCOUNTER — OFFICE VISIT (OUTPATIENT)
Dept: FAMILY MEDICINE CLINIC | Facility: CLINIC | Age: 23
End: 2018-11-14
Payer: COMMERCIAL

## 2018-11-14 VITALS
TEMPERATURE: 98.3 F | WEIGHT: 257 LBS | HEIGHT: 66 IN | HEART RATE: 80 BPM | DIASTOLIC BLOOD PRESSURE: 70 MMHG | RESPIRATION RATE: 18 BRPM | BODY MASS INDEX: 41.3 KG/M2 | SYSTOLIC BLOOD PRESSURE: 124 MMHG

## 2018-11-14 DIAGNOSIS — Z11.1 PPD SCREENING TEST: ICD-10-CM

## 2018-11-14 DIAGNOSIS — N94.6 MENSTRUAL CRAMPS: ICD-10-CM

## 2018-11-14 DIAGNOSIS — Z02.1 PHYSICAL EXAM, PRE-EMPLOYMENT: Primary | ICD-10-CM

## 2018-11-14 PROCEDURE — 1036F TOBACCO NON-USER: CPT | Performed by: STUDENT IN AN ORGANIZED HEALTH CARE EDUCATION/TRAINING PROGRAM

## 2018-11-14 PROCEDURE — 99213 OFFICE O/P EST LOW 20 MIN: CPT | Performed by: STUDENT IN AN ORGANIZED HEALTH CARE EDUCATION/TRAINING PROGRAM

## 2018-11-14 PROCEDURE — 3008F BODY MASS INDEX DOCD: CPT | Performed by: STUDENT IN AN ORGANIZED HEALTH CARE EDUCATION/TRAINING PROGRAM

## 2018-11-14 PROCEDURE — 86580 TB INTRADERMAL TEST: CPT | Performed by: STUDENT IN AN ORGANIZED HEALTH CARE EDUCATION/TRAINING PROGRAM

## 2018-11-14 RX ORDER — IBUPROFEN 600 MG/1
600 TABLET ORAL EVERY 6 HOURS PRN
Qty: 30 TABLET | Refills: 0 | Status: SHIPPED | OUTPATIENT
Start: 2018-11-14 | End: 2019-03-26 | Stop reason: SDUPTHER

## 2018-11-16 ENCOUNTER — CLINICAL SUPPORT (OUTPATIENT)
Dept: FAMILY MEDICINE CLINIC | Facility: CLINIC | Age: 23
End: 2018-11-16

## 2018-11-16 DIAGNOSIS — Z11.1 PPD SCREENING TEST: Primary | ICD-10-CM

## 2018-11-16 LAB
INDURATION: 0 MM
TB SKIN TEST: NEGATIVE

## 2019-03-18 ENCOUNTER — TELEPHONE (OUTPATIENT)
Dept: FAMILY MEDICINE CLINIC | Facility: CLINIC | Age: 24
End: 2019-03-18

## 2019-03-18 NOTE — TELEPHONE ENCOUNTER
[facility] requesting refill    Fluoxetine HCL 20mg take 2 capsules by mouth every day  Please advise   Thank you

## 2019-03-19 DIAGNOSIS — F41.8 DEPRESSION WITH ANXIETY: Primary | ICD-10-CM

## 2019-03-19 RX ORDER — FLUOXETINE HYDROCHLORIDE 20 MG/1
40 CAPSULE ORAL DAILY
Qty: 180 CAPSULE | Refills: 1 | Status: SHIPPED | OUTPATIENT
Start: 2019-03-19

## 2019-03-25 ENCOUNTER — TELEPHONE (OUTPATIENT)
Dept: FAMILY MEDICINE CLINIC | Facility: CLINIC | Age: 24
End: 2019-03-25

## 2019-03-25 DIAGNOSIS — F41.8 DEPRESSION WITH ANXIETY: ICD-10-CM

## 2019-03-25 DIAGNOSIS — N94.6 MENSTRUAL CRAMPS: Primary | ICD-10-CM

## 2019-03-25 NOTE — TELEPHONE ENCOUNTER
Patient is calling requesting refills on the following medications:    Ibuprofen 600 mg   hydrOXYzine pamoate (VISTARIL) 25 mg cap

## 2019-03-26 RX ORDER — HYDROXYZINE PAMOATE 25 MG/1
25 CAPSULE ORAL 3 TIMES DAILY PRN
Qty: 90 CAPSULE | Refills: 1 | Status: SHIPPED | OUTPATIENT
Start: 2019-03-26 | End: 2019-06-18 | Stop reason: SDUPTHER

## 2019-03-26 RX ORDER — IBUPROFEN 600 MG/1
600 TABLET ORAL EVERY 6 HOURS PRN
Qty: 30 TABLET | Refills: 0 | Status: SHIPPED | OUTPATIENT
Start: 2019-03-26 | End: 2019-06-18 | Stop reason: SDUPTHER

## 2019-04-19 ENCOUNTER — TELEPHONE (OUTPATIENT)
Dept: FAMILY MEDICINE CLINIC | Facility: CLINIC | Age: 24
End: 2019-04-19

## 2019-04-19 DIAGNOSIS — N94.6 DYSMENORRHEA: Primary | ICD-10-CM

## 2019-04-22 RX ORDER — NORGESTREL-ETHINYL ESTRADIOL 0.3-0.03MG
1 TABLET ORAL DAILY
Qty: 30 TABLET | Refills: 1 | Status: SHIPPED | OUTPATIENT
Start: 2019-04-22 | End: 2019-07-05 | Stop reason: SDUPTHER

## 2019-06-18 ENCOUNTER — OFFICE VISIT (OUTPATIENT)
Dept: FAMILY MEDICINE CLINIC | Facility: CLINIC | Age: 24
End: 2019-06-18

## 2019-06-18 VITALS
DIASTOLIC BLOOD PRESSURE: 80 MMHG | HEART RATE: 78 BPM | HEIGHT: 67 IN | BODY MASS INDEX: 41.21 KG/M2 | RESPIRATION RATE: 16 BRPM | SYSTOLIC BLOOD PRESSURE: 100 MMHG | TEMPERATURE: 97.9 F | WEIGHT: 262.6 LBS

## 2019-06-18 DIAGNOSIS — F41.8 DEPRESSION WITH ANXIETY: ICD-10-CM

## 2019-06-18 DIAGNOSIS — N94.6 MENSTRUAL CRAMPS: ICD-10-CM

## 2019-06-18 PROBLEM — N20.0 NEPHROLITHIASIS: Status: RESOLVED | Noted: 2018-07-12 | Resolved: 2019-06-18

## 2019-06-18 PROBLEM — Z30.09 FAMILY PLANNING: Status: RESOLVED | Noted: 2018-01-25 | Resolved: 2019-06-18

## 2019-06-18 PROBLEM — Z00.00 HEALTH MAINTENANCE EXAMINATION: Status: RESOLVED | Noted: 2018-04-12 | Resolved: 2019-06-18

## 2019-06-18 PROCEDURE — 1036F TOBACCO NON-USER: CPT | Performed by: FAMILY MEDICINE

## 2019-06-18 PROCEDURE — 99213 OFFICE O/P EST LOW 20 MIN: CPT | Performed by: FAMILY MEDICINE

## 2019-06-18 RX ORDER — IBUPROFEN 600 MG/1
600 TABLET ORAL EVERY 6 HOURS PRN
Qty: 30 TABLET | Refills: 2 | Status: SHIPPED | OUTPATIENT
Start: 2019-06-18 | End: 2020-04-16 | Stop reason: SDUPTHER

## 2019-06-18 RX ORDER — HYDROXYZINE PAMOATE 25 MG/1
25 CAPSULE ORAL 3 TIMES DAILY PRN
Qty: 90 CAPSULE | Refills: 1 | Status: SHIPPED | OUTPATIENT
Start: 2019-06-18

## 2019-06-19 ENCOUNTER — APPOINTMENT (OUTPATIENT)
Dept: LAB | Age: 24
End: 2019-06-19
Payer: COMMERCIAL

## 2019-06-19 LAB
ALBUMIN SERPL BCP-MCNC: 3.7 G/DL (ref 3.5–5)
ALP SERPL-CCNC: 88 U/L (ref 46–116)
ALT SERPL W P-5'-P-CCNC: 37 U/L (ref 12–78)
ANION GAP SERPL CALCULATED.3IONS-SCNC: 5 MMOL/L (ref 4–13)
AST SERPL W P-5'-P-CCNC: 17 U/L (ref 5–45)
BILIRUB SERPL-MCNC: 0.36 MG/DL (ref 0.2–1)
BUN SERPL-MCNC: 9 MG/DL (ref 5–25)
CALCIUM SERPL-MCNC: 9.2 MG/DL (ref 8.3–10.1)
CHLORIDE SERPL-SCNC: 104 MMOL/L (ref 100–108)
CHOLEST SERPL-MCNC: 194 MG/DL (ref 50–200)
CO2 SERPL-SCNC: 25 MMOL/L (ref 21–32)
CREAT SERPL-MCNC: 0.87 MG/DL (ref 0.6–1.3)
GFR SERPL CREATININE-BSD FRML MDRD: 109 ML/MIN/1.73SQ M
GLUCOSE P FAST SERPL-MCNC: 84 MG/DL (ref 65–99)
HDLC SERPL-MCNC: 36 MG/DL (ref 40–60)
LDLC SERPL CALC-MCNC: 116 MG/DL (ref 0–100)
NONHDLC SERPL-MCNC: 158 MG/DL
POTASSIUM SERPL-SCNC: 3.8 MMOL/L (ref 3.5–5.3)
PROT SERPL-MCNC: 7.5 G/DL (ref 6.4–8.2)
SODIUM SERPL-SCNC: 134 MMOL/L (ref 136–145)
TRIGL SERPL-MCNC: 211 MG/DL

## 2019-06-19 PROCEDURE — 80053 COMPREHEN METABOLIC PANEL: CPT

## 2019-06-19 PROCEDURE — 80061 LIPID PANEL: CPT

## 2019-06-19 PROCEDURE — 36415 COLL VENOUS BLD VENIPUNCTURE: CPT

## 2019-07-05 DIAGNOSIS — N94.6 DYSMENORRHEA: ICD-10-CM

## 2019-07-06 RX ORDER — NORGESTREL-ETHINYL ESTRADIOL 0.3-0.03MG
TABLET ORAL
Qty: 28 TABLET | Refills: 1 | Status: SHIPPED | OUTPATIENT
Start: 2019-07-06 | End: 2019-08-31 | Stop reason: SDUPTHER

## 2019-07-10 ENCOUNTER — OFFICE VISIT (OUTPATIENT)
Dept: FAMILY MEDICINE CLINIC | Facility: CLINIC | Age: 24
End: 2019-07-10

## 2019-07-10 VITALS
DIASTOLIC BLOOD PRESSURE: 60 MMHG | BODY MASS INDEX: 41.59 KG/M2 | HEIGHT: 67 IN | TEMPERATURE: 98.4 F | RESPIRATION RATE: 18 BRPM | WEIGHT: 265 LBS | SYSTOLIC BLOOD PRESSURE: 100 MMHG | HEART RATE: 76 BPM

## 2019-07-10 DIAGNOSIS — E78.1 HYPERTRIGLYCERIDEMIA WITHOUT HYPERCHOLESTEROLEMIA: ICD-10-CM

## 2019-07-10 DIAGNOSIS — R55 VASOVAGAL SYNCOPE: Primary | ICD-10-CM

## 2019-07-10 PROCEDURE — 3008F BODY MASS INDEX DOCD: CPT | Performed by: NURSE PRACTITIONER

## 2019-07-10 PROCEDURE — 3725F SCREEN DEPRESSION PERFORMED: CPT | Performed by: NURSE PRACTITIONER

## 2019-07-10 PROCEDURE — 99213 OFFICE O/P EST LOW 20 MIN: CPT | Performed by: NURSE PRACTITIONER

## 2019-07-10 NOTE — PROGRESS NOTES
Assessment/Plan:    Vasovagal syncope  Advised to keep hydrated and watch with positional changes  Will follow up as needed  Hypertriglyceridemia without hypercholesterolemia  Advised low fat diet; weight loss  Problem List Items Addressed This Visit        Cardiovascular and Mediastinum    Vasovagal syncope - Primary     Advised to keep hydrated and watch with positional changes  Will follow up as needed  Other    Hypertriglyceridemia without hypercholesterolemia     Advised low fat diet; weight loss  Subjective:      Patient ID: Yobany Holly is a 26 y o  female  26year old presents for lab review  States she has low BP and experiences dizziness with positional changes  Does not hydrate enough, works at a day care center  No other concerns  The following portions of the patient's history were reviewed and updated as appropriate: allergies, current medications, past family history, past medical history, past social history, past surgical history and problem list     Review of Systems   Constitutional: Negative  HENT: Negative  Respiratory: Negative  Cardiovascular: Negative  Neurological: Positive for light-headedness (with positional changes)  Psychiatric/Behavioral: Negative  Objective:      /60   Pulse 76   Temp 98 4 °F (36 9 °C)   Resp 18   Ht 5' 7" (1 702 m)   Wt 120 kg (265 lb)   BMI 41 50 kg/m²          Physical Exam   Constitutional: She is oriented to person, place, and time  She appears well-developed and well-nourished  HENT:   Head: Normocephalic and atraumatic  Eyes: Pupils are equal, round, and reactive to light  Neck: Normal range of motion  Cardiovascular: Normal rate, regular rhythm and normal heart sounds  Pulmonary/Chest: Effort normal and breath sounds normal    Neurological: She is alert and oriented to person, place, and time  Psychiatric: She has a normal mood and affect   Her behavior is normal  Judgment and thought content normal

## 2019-07-24 ENCOUNTER — ANNUAL EXAM (OUTPATIENT)
Dept: FAMILY MEDICINE CLINIC | Facility: CLINIC | Age: 24
End: 2019-07-24

## 2019-07-24 VITALS
BODY MASS INDEX: 41.59 KG/M2 | HEIGHT: 67 IN | TEMPERATURE: 98.3 F | SYSTOLIC BLOOD PRESSURE: 120 MMHG | WEIGHT: 265 LBS | DIASTOLIC BLOOD PRESSURE: 80 MMHG | RESPIRATION RATE: 18 BRPM | HEART RATE: 78 BPM

## 2019-07-24 DIAGNOSIS — Z01.419 WELL WOMAN EXAM WITH ROUTINE GYNECOLOGICAL EXAM: Primary | ICD-10-CM

## 2019-07-24 PROCEDURE — 88175 CYTOPATH C/V AUTO FLUID REDO: CPT | Performed by: NURSE PRACTITIONER

## 2019-07-24 PROCEDURE — 99395 PREV VISIT EST AGE 18-39: CPT | Performed by: NURSE PRACTITIONER

## 2019-07-24 NOTE — ASSESSMENT & PLAN NOTE
Thin prep for evaluation for cervical cancer    On OCP therapy  Had HPV vaccine series  No concerns  Will reevaluate in 3 years if results are normal

## 2019-07-24 NOTE — PROGRESS NOTES
Assessment/Plan:    Well woman exam with routine gynecological exam  Thin prep for evaluation for cervical cancer  On OCP therapy  Had HPV vaccine series  No concerns  Will reevaluate in 3 years if results are normal          Problem List Items Addressed This Visit        Other    Well woman exam with routine gynecological exam - Primary     Thin prep for evaluation for cervical cancer  On OCP therapy  Had HPV vaccine series  No concerns  Will reevaluate in 3 years if results are normal          Relevant Orders    Liquid-based pap, diagnostic            Subjective:      Patient ID: Car Macedo is a 26 y o  female  26year old presents for routine PAP  She is currently not sexually active and is on OCP  Menses are normal  No concerns  Car Macedo is a 26 y o  woman who comes in today for a  pap smear only  Her most recent annual exam was on 2017  Her most recent Pap smear was on 2016 and showed no abnormalities  Previous abnormal Pap smears: no  Contraception: OCP (estrogen/progesterone)    The following portions of the patient's history were reviewed and updated as appropriate: allergies, current medications, past family history, past medical history, past social history, past surgical history and problem list     Review of Systems  A comprehensive review of systems was negative       Objective    /80   Pulse 78   Temp 98 3 °F (36 8 °C)   Resp 18   Ht 5' 7" (1 702 m)   Wt 120 kg (265 lb)   BMI 41 50 kg/m²   Pelvic Exam: external genitalia normal, vagina normal without discharge and cervix normal in appearance  Bi-manual exam normal    Pap smear obtained  Thin prep  Assessment/Plan    Screening pap smear  3 years but follow up in 1 year for OCP  management    Follow up in 1 year, or as indicated by Pap results          The following portions of the patient's history were reviewed and updated as appropriate: allergies, current medications, past family history, past medical history, past social history, past surgical history and problem list     Review of Systems   Constitutional: Negative  HENT: Negative  Eyes: Negative  Respiratory: Negative  Cardiovascular: Negative  Gastrointestinal: Negative  Endocrine: Negative  Genitourinary: Negative  Musculoskeletal: Negative  Skin: Negative  Allergic/Immunologic: Negative  Neurological: Negative  Hematological: Negative  Psychiatric/Behavioral: Negative  Objective:      /80   Pulse 78   Temp 98 3 °F (36 8 °C)   Resp 18   Ht 5' 7" (1 702 m)   Wt 120 kg (265 lb)   BMI 41 50 kg/m²          Physical Exam   Constitutional: She appears well-developed and well-nourished  HENT:   Head: Normocephalic  Cardiovascular: Normal rate and regular rhythm  Pulmonary/Chest: Effort normal and breath sounds normal    Genitourinary: Vagina normal and uterus normal    Skin: Skin is warm and dry  Psychiatric: She has a normal mood and affect   Her behavior is normal  Judgment and thought content normal

## 2019-07-26 LAB
LAB AP GYN PRIMARY INTERPRETATION: NORMAL
Lab: NORMAL

## 2019-08-31 DIAGNOSIS — N94.6 DYSMENORRHEA: ICD-10-CM

## 2019-08-31 RX ORDER — NORGESTREL-ETHINYL ESTRADIOL 0.3-0.03MG
TABLET ORAL
Qty: 28 TABLET | Refills: 1 | Status: SHIPPED | OUTPATIENT
Start: 2019-08-31 | End: 2019-10-13 | Stop reason: SDUPTHER

## 2019-10-13 DIAGNOSIS — N94.6 DYSMENORRHEA: ICD-10-CM

## 2019-10-14 RX ORDER — NORGESTREL-ETHINYL ESTRADIOL 0.3-0.03MG
TABLET ORAL
Qty: 28 TABLET | Refills: 1 | Status: SHIPPED | OUTPATIENT
Start: 2019-10-14 | End: 2019-12-18 | Stop reason: SDUPTHER

## 2019-12-10 ENCOUNTER — OFFICE VISIT (OUTPATIENT)
Dept: FAMILY MEDICINE CLINIC | Facility: CLINIC | Age: 24
End: 2019-12-10

## 2019-12-10 VITALS
RESPIRATION RATE: 18 BRPM | BODY MASS INDEX: 39.65 KG/M2 | HEIGHT: 67 IN | WEIGHT: 252.6 LBS | HEART RATE: 82 BPM | TEMPERATURE: 98.3 F | SYSTOLIC BLOOD PRESSURE: 110 MMHG | DIASTOLIC BLOOD PRESSURE: 80 MMHG

## 2019-12-10 DIAGNOSIS — R55 SYNCOPE, UNSPECIFIED SYNCOPE TYPE: Primary | ICD-10-CM

## 2019-12-10 PROCEDURE — 3008F BODY MASS INDEX DOCD: CPT | Performed by: FAMILY MEDICINE

## 2019-12-10 PROCEDURE — 1036F TOBACCO NON-USER: CPT | Performed by: FAMILY MEDICINE

## 2019-12-10 PROCEDURE — 93000 ELECTROCARDIOGRAM COMPLETE: CPT | Performed by: FAMILY MEDICINE

## 2019-12-10 PROCEDURE — 99213 OFFICE O/P EST LOW 20 MIN: CPT | Performed by: FAMILY MEDICINE

## 2019-12-10 NOTE — ASSESSMENT & PLAN NOTE
broad differential diagnosis, including card, neuro, psychologic, endo, other etiologies  POC EKG unremarkable  orthostatic blood pressure within normal limits  We will order CBC, TSH, CMP, magnesium, phosphorus, echo, spot EEG, carotid Doppler, for 48 hours  Also referred patient to physical therapy for formal physical therapy and vestibular therapy  Return to office after all tests done    Fall precaution discussed with patient and Mom

## 2019-12-10 NOTE — PROGRESS NOTES
Assessment/Plan:       Problem List Items Addressed This Visit        Cardiovascular and Mediastinum    Syncope - Primary     broad differential diagnosis, including card, neuro, psychologic, endo, other etiologies  POC EKG unremarkable  orthostatic blood pressure within normal limits  We will order CBC, TSH, CMP, magnesium, phosphorus, echo, spot EEG, carotid Doppler, for 48 hours  Also referred patient to physical therapy for formal physical therapy and vestibular therapy  Return to office after all tests done  Fall precaution discussed with patient and Mom         Relevant Orders    Echo complete with contrast if indicated    CBC and differential    Comprehensive metabolic panel    Magnesium    Phosphorus    TSH, 3rd generation with Free T4 reflex    POCT ECG (Completed)    Ambulatory referral to Physical Therapy    VAS carotid complete study    EEG Awake and asleep    Holter monitor - 48 hour            Subjective:      Patient ID: Little Calvo is a 22 y o  female  HPI  45-year-old female here for recurrent syncope  Within the past month, patient has developed 7 a result of syncope  Patient reported that sometimes she sees different color before she passed out  Most of the time she had some blurry vision before she passed out  she does not pay attention to palpitation  She denies chest pain and short of breath,  abdominal pain and diarrhea, incontinence, weakness or numbness  No headache  The last episode was yesterday  She was cleaning in, mopping the bathtub and then she found herself on the bathtub  Nobody was at home with her at that time  She was unsure how long she passed out  She reported no pain or injury on her head, all headache  She did not recall any incontinence at that time  Before and after the episode, she does not recall any seizure or shaking activity      was seen by cardiologist on 7/18: Holter monitor in 5/18 was normal, but echo showed RV mildly dilated with low normal function, with moderate TR and normal PASP  It was recommended she have cardiac MRI for evaluation of RV for ARVD  Cardiac MRI was done 6/18/18, and showed normal RV size and function, no signs of ARVD  Was seen in July this year at the office, diagnosed with vasovagal syncope, supportive care discussed with patient and Mom at that time  Denies personal and family history of seizure or shaking disorder  Denies family history of sudden deaths in the family  Denies taking any weight loss medication  Denied taking any of her oral supplementation  Denies drug use  She finished high school but has stayed at home since then because "I cannot remember anything  I cannot study  I am always like that "    Past medical history significant for anxiety depression  she is on Prozac 20 mg daily (instead of taking 40 mg daily as instructed) and atarax prn  Denies anxious or depressed mood  Currently, she does not have any symptoms  Review of Systems    As above    Objective:      /72 (BP Location: Left arm, Patient Position: Sitting, Cuff Size: Large)   Pulse 80   Temp 98 3 °F (36 8 °C) (Tympanic)   Resp 18   Ht 5' 7" (1 702 m)   Wt 115 kg (252 lb 9 6 oz)   BMI 39 56 kg/m²          Physical Exam   Constitutional: She is oriented to person, place, and time  She appears well-developed and well-nourished  No distress  HENT:   Head: Normocephalic and atraumatic  Right Ear: External ear normal    Left Ear: External ear normal    Nose: Nose normal    Mouth/Throat: Oropharynx is clear and moist  No oropharyngeal exudate  Eyes: Pupils are equal, round, and reactive to light  Conjunctivae and EOM are normal  Right eye exhibits no discharge  Left eye exhibits no discharge  No scleral icterus  Neck: Normal range of motion  Neck supple  No thyromegaly present  Cardiovascular: Normal rate and regular rhythm  Murmur heard    Pulmonary/Chest: Effort normal and breath sounds normal  She has no wheezes  She has no rales  Abdominal: Soft  Bowel sounds are normal  She exhibits distension (obese waist)  She exhibits no mass  There is no tenderness  There is no rebound and no guarding  Musculoskeletal: Normal range of motion  She exhibits no edema, tenderness or deformity  Lymphadenopathy:     She has no cervical adenopathy  Neurological: She is alert and oriented to person, place, and time  She displays normal reflexes  No cranial nerve deficit or sensory deficit  She exhibits normal muscle tone  Coordination normal    Skin: Skin is warm  Capillary refill takes less than 2 seconds  No rash noted  She is not diaphoretic  No erythema  No pallor  Psychiatric:   Answered appropriately but mildly slowly   Nursing note and vitals reviewed

## 2019-12-10 NOTE — LETTER
December 10, 2019     Patient: Toan Frazier   YOB: 1995   Date of Visit: 12/10/2019       To Whom it May Concern:    Toan Frazier is under my professional care  She was seen in my office on 12/10/2019  She may return to work on 12/11/19  If you have any questions or concerns, please don't hesitate to call           Sincerely,          Delmy Langford MD        CC: No Recipients

## 2019-12-10 NOTE — PATIENT INSTRUCTIONS
Syncope   WHAT YOU NEED TO KNOW:   Syncope is also called fainting or passing out  Syncope is a sudden, temporary loss of consciousness, followed by a fall from a standing or sitting position  Syncope ranges from not serious to a sign of a more serious condition that needs to be treated  You can control some health conditions that cause syncope  Your healthcare providers can help you create a plan to manage syncope and prevent episodes  DISCHARGE INSTRUCTIONS:   Seek care immediately if:   · You are bleeding because you hit your head when you fainted  · You suddenly have double vision, difficulty speaking, numbness, and cannot move your arms or legs  · You have chest pain and trouble breathing  · You vomit blood or material that looks like coffee grounds  · You see blood in your bowel movement  Contact your healthcare provider if:   · You have new or worsening symptoms  · You have another syncope episode  · You have a headache, fast heartbeat, or feel too dizzy to stand up  · You have questions or concerns about your condition or care  Follow up with your healthcare provider as directed:  Write down your questions so you remember to ask them during your visits  Manage syncope:   · Keep a record of your syncope episodes  Include your symptoms and your activity before and after the episode  The record can help your healthcare provider find the cause of your syncope and help you manage episodes  · Sit or lie down when needed  This includes when you feel dizzy, your throat is getting tight, and your vision changes  Raise your legs above the level of your heart  · Take slow, deep breaths if you start to breathe faster with anxiety or fear  This can help decrease dizziness and the feeling that you might faint  · Check your blood pressure often  This is important if you take medicine to lower your blood pressure   Check your blood pressure when you are lying down and when you are standing  Ask how often to check during the day  Keep a record of your blood pressure numbers  Your healthcare provider may use the record to help plan your treatment  Prevent a syncope episode:   · Move slowly and let yourself get used to one position before you move to another position  This is very important when you change from a lying or sitting position to a standing position  Take some deep breaths before you stand up from a lying position  Stand up slowly  Sudden movements may cause a fainting spell  Sit on the side of the bed or couch for a few minutes before you stand up  If you are on bedrest, try to be upright for about 2 hours each day, or as directed  Do not lock your legs if you are standing for a long period of time  Move your legs and bend your knees to keep blood flowing  · Follow your healthcare provider's recommendations  Your provider may  recommend that you drink more liquids to prevent dehydration  You may also need to have more salt to keep your blood pressure from dropping too low and causing syncope  Your provider will tell you how much liquid and sodium to have each day  · Watch for signs of low blood sugar  These include hunger, nervousness, sweating, and fast or fluttery heartbeats  Talk with your healthcare provider about ways to keep your blood sugar level steady  · Do not strain if you are constipated  You may faint if you strain to have a bowel movement  Walking is the best way to get your bowels moving  Eat foods high in fiber to make it easier to have a bowel movement  Good examples are high-fiber cereals, beans, vegetables, and whole-grain breads  Prune juice may help make bowel movements softer  · Be careful in hot weather  Heat can cause a syncope episode  Limit activity done outside on hot days  Physical activity in hot weather can lead to dehydration  This can cause an episode    © 2017 [employer] Information is for End User's use only and may not be sold, redistributed or otherwise used for commercial purposes  All illustrations and images included in CareNotes® are the copyrighted property of A [employer] M [employer] Inc  or [employer]  The above information is an  only  It is not intended as medical advice for individual conditions or treatments  Talk to your doctor, nurse or pharmacist before following any medical regimen to see if it is safe and effective for you

## 2019-12-18 DIAGNOSIS — N94.6 DYSMENORRHEA: ICD-10-CM

## 2019-12-19 RX ORDER — NORGESTREL-ETHINYL ESTRADIOL 0.3-0.03MG
TABLET ORAL
Qty: 28 TABLET | Refills: 1 | Status: SHIPPED | OUTPATIENT
Start: 2019-12-19 | End: 2020-02-12

## 2019-12-20 ENCOUNTER — HOSPITAL ENCOUNTER (OUTPATIENT)
Dept: NEUROLOGY | Facility: CLINIC | Age: 24
Discharge: HOME | End: 2019-12-20
Payer: COMMERCIAL

## 2019-12-20 DIAGNOSIS — R55 SYNCOPE, UNSPECIFIED SYNCOPE TYPE: ICD-10-CM

## 2019-12-20 PROCEDURE — 95819 EEG AWAKE AND ASLEEP: CPT

## 2019-12-20 PROCEDURE — 95813 EEG EXTND MNTR 61-119 MIN: CPT | Performed by: PSYCHIATRY & NEUROLOGY

## 2020-01-03 ENCOUNTER — APPOINTMENT (OUTPATIENT)
Dept: LAB | Age: 25
End: 2020-01-03
Payer: COMMERCIAL

## 2020-01-03 DIAGNOSIS — R55 SYNCOPE, UNSPECIFIED SYNCOPE TYPE: ICD-10-CM

## 2020-01-03 LAB
ALBUMIN SERPL BCP-MCNC: 3.8 G/DL (ref 3.5–5)
ALP SERPL-CCNC: 88 U/L (ref 46–116)
ALT SERPL W P-5'-P-CCNC: 27 U/L (ref 12–78)
ANION GAP SERPL CALCULATED.3IONS-SCNC: 6 MMOL/L (ref 4–13)
AST SERPL W P-5'-P-CCNC: 13 U/L (ref 5–45)
BASOPHILS # BLD AUTO: 0.03 THOUSANDS/ΜL (ref 0–0.1)
BASOPHILS NFR BLD AUTO: 0 % (ref 0–1)
BILIRUB SERPL-MCNC: 0.31 MG/DL (ref 0.2–1)
BUN SERPL-MCNC: 15 MG/DL (ref 5–25)
CALCIUM SERPL-MCNC: 9.4 MG/DL (ref 8.3–10.1)
CHLORIDE SERPL-SCNC: 108 MMOL/L (ref 100–108)
CO2 SERPL-SCNC: 26 MMOL/L (ref 21–32)
CREAT SERPL-MCNC: 0.96 MG/DL (ref 0.6–1.3)
EOSINOPHIL # BLD AUTO: 0.05 THOUSAND/ΜL (ref 0–0.61)
EOSINOPHIL NFR BLD AUTO: 1 % (ref 0–6)
ERYTHROCYTE [DISTWIDTH] IN BLOOD BY AUTOMATED COUNT: 13 % (ref 11.6–15.1)
GFR SERPL CREATININE-BSD FRML MDRD: 96 ML/MIN/1.73SQ M
GLUCOSE SERPL-MCNC: 124 MG/DL (ref 65–140)
HCT VFR BLD AUTO: 42 % (ref 34.8–46.1)
HGB BLD-MCNC: 13.2 G/DL (ref 11.5–15.4)
IMM GRANULOCYTES # BLD AUTO: 0.04 THOUSAND/UL (ref 0–0.2)
IMM GRANULOCYTES NFR BLD AUTO: 0 % (ref 0–2)
LYMPHOCYTES # BLD AUTO: 2.24 THOUSANDS/ΜL (ref 0.6–4.47)
LYMPHOCYTES NFR BLD AUTO: 21 % (ref 14–44)
MAGNESIUM SERPL-MCNC: 2.3 MG/DL (ref 1.6–2.6)
MCH RBC QN AUTO: 30.8 PG (ref 26.8–34.3)
MCHC RBC AUTO-ENTMCNC: 31.4 G/DL (ref 31.4–37.4)
MCV RBC AUTO: 98 FL (ref 82–98)
MONOCYTES # BLD AUTO: 0.39 THOUSAND/ΜL (ref 0.17–1.22)
MONOCYTES NFR BLD AUTO: 4 % (ref 4–12)
NEUTROPHILS # BLD AUTO: 7.91 THOUSANDS/ΜL (ref 1.85–7.62)
NEUTS SEG NFR BLD AUTO: 74 % (ref 43–75)
NRBC BLD AUTO-RTO: 0 /100 WBCS
PHOSPHATE SERPL-MCNC: 3 MG/DL (ref 2.7–4.5)
PLATELET # BLD AUTO: 284 THOUSANDS/UL (ref 149–390)
PMV BLD AUTO: 10.7 FL (ref 8.9–12.7)
POTASSIUM SERPL-SCNC: 3.7 MMOL/L (ref 3.5–5.3)
PROT SERPL-MCNC: 7.7 G/DL (ref 6.4–8.2)
RBC # BLD AUTO: 4.29 MILLION/UL (ref 3.81–5.12)
SODIUM SERPL-SCNC: 140 MMOL/L (ref 136–145)
TSH SERPL DL<=0.05 MIU/L-ACNC: 0.8 UIU/ML (ref 0.36–3.74)
WBC # BLD AUTO: 10.66 THOUSAND/UL (ref 4.31–10.16)

## 2020-01-03 PROCEDURE — 85025 COMPLETE CBC W/AUTO DIFF WBC: CPT

## 2020-01-03 PROCEDURE — 36415 COLL VENOUS BLD VENIPUNCTURE: CPT

## 2020-01-03 PROCEDURE — 83735 ASSAY OF MAGNESIUM: CPT

## 2020-01-03 PROCEDURE — 84443 ASSAY THYROID STIM HORMONE: CPT

## 2020-01-03 PROCEDURE — 84100 ASSAY OF PHOSPHORUS: CPT

## 2020-01-03 PROCEDURE — 80053 COMPREHEN METABOLIC PANEL: CPT

## 2020-01-08 ENCOUNTER — EVALUATION (OUTPATIENT)
Dept: PHYSICAL THERAPY | Facility: CLINIC | Age: 25
End: 2020-01-08
Payer: COMMERCIAL

## 2020-01-08 DIAGNOSIS — R55 SYNCOPE, UNSPECIFIED SYNCOPE TYPE: ICD-10-CM

## 2020-01-08 PROCEDURE — 97163 PT EVAL HIGH COMPLEX 45 MIN: CPT | Performed by: PHYSICAL THERAPIST

## 2020-01-08 NOTE — PROGRESS NOTES
PT Evaluation     Today's date: 2020  Patient name: Hiram Martinez  : 1995  MRN: [id number]  Referring provider: Roselle Schirmer, MD  Dx:   Encounter Diagnosis     ICD-10-CM    1  Syncope, unspecified syncope type R55 Ambulatory referral to Physical Therapy                  Assessment  Assessment details: Pt presents to physical therapy with recurrent episodes of syncope  At this time patient has normal vestibular function per oculomotor, vestibular and positional testing  She did experience dizziness with roll testing bilaterally but not corresponding nystagmus  She did return to baseline symptoms as soon as neck was in  A neural position  At this itme she is not appropriate for physical therapy for vestibular involvement as she does not have any positive testing for vestibular origin of dizziness  Goals  na    Plan  Plan details: none        Subjective Evaluation    History of Present Illness  Mechanism of injury: Pt reports having frequent episodes of fainting  She has been having episodes for about two years  She has seen cardiology who has been unable to identify cause  She reports incidences happening when standing up after sitting for a long time, bending over, standing up fast  She has no other symptoms of dizziness, headache, fatigue etc theonly symptom she has is passing out  Exercise does make her feel dizzy  Pain  No pain reported    Exercise history: stopped due to feeling dizzy      Diagnostic Tests  CT scan: normal        Objective   Neuro Exam:     Oculomotor exam   Oculomotor ROM: WNL  Resting nystagmus: not present   Gaze holding nystagmus: not present left  and not present right  Smooth pursuits: within normal limits  Vertical saccades: normal  Horizontal saccades: normal  Convergence: normal  Left normal: unable to appreciate due to frequent blinking of eyes      Positional testing   Lizet-Hallpike   Left posterior canal: WNL  Right posterior canal: WNL  Roll test   Left horizontal canal: symptomatic  Right horizontal canal: symptomatic             Precautions: syncope      Manual                                                                                   Exercise Diary                                                                                                                                                                                                                                                                                      Modalities

## 2020-02-10 DIAGNOSIS — N94.6 DYSMENORRHEA: ICD-10-CM

## 2020-02-12 RX ORDER — NORGESTREL-ETHINYL ESTRADIOL 0.3-0.03MG
TABLET ORAL
Qty: 28 TABLET | Refills: 1 | Status: SHIPPED | OUTPATIENT
Start: 2020-02-12 | End: 2020-04-23 | Stop reason: SDUPTHER

## 2020-03-10 ENCOUNTER — OFFICE VISIT (OUTPATIENT)
Dept: FAMILY MEDICINE CLINIC | Facility: CLINIC | Age: 25
End: 2020-03-10

## 2020-03-10 VITALS
BODY MASS INDEX: 38.18 KG/M2 | SYSTOLIC BLOOD PRESSURE: 112 MMHG | WEIGHT: 243.8 LBS | RESPIRATION RATE: 18 BRPM | DIASTOLIC BLOOD PRESSURE: 68 MMHG | TEMPERATURE: 98.1 F | HEART RATE: 76 BPM

## 2020-03-10 DIAGNOSIS — Z11.8 ENCOUNTER FOR SCREENING EXAMINATION FOR CHLAMYDIAL INFECTION: ICD-10-CM

## 2020-03-10 DIAGNOSIS — Z31.9 DESIRE FOR PREGNANCY: ICD-10-CM

## 2020-03-10 DIAGNOSIS — Z11.3 SCREENING FOR STD (SEXUALLY TRANSMITTED DISEASE): ICD-10-CM

## 2020-03-10 DIAGNOSIS — B37.3 VAGINAL CANDIDIASIS: Primary | ICD-10-CM

## 2020-03-10 PROBLEM — B37.31 VAGINAL CANDIDIASIS: Status: ACTIVE | Noted: 2020-03-10

## 2020-03-10 PROCEDURE — 87491 CHLMYD TRACH DNA AMP PROBE: CPT | Performed by: FAMILY MEDICINE

## 2020-03-10 PROCEDURE — 87660 TRICHOMONAS VAGIN DIR PROBE: CPT | Performed by: FAMILY MEDICINE

## 2020-03-10 PROCEDURE — 87591 N.GONORRHOEAE DNA AMP PROB: CPT | Performed by: FAMILY MEDICINE

## 2020-03-10 PROCEDURE — 1036F TOBACCO NON-USER: CPT | Performed by: FAMILY MEDICINE

## 2020-03-10 PROCEDURE — 99213 OFFICE O/P EST LOW 20 MIN: CPT | Performed by: FAMILY MEDICINE

## 2020-03-10 PROCEDURE — 87510 GARDNER VAG DNA DIR PROBE: CPT | Performed by: FAMILY MEDICINE

## 2020-03-10 PROCEDURE — 87480 CANDIDA DNA DIR PROBE: CPT | Performed by: FAMILY MEDICINE

## 2020-03-10 RX ORDER — FLUCONAZOLE 150 MG/1
150 TABLET ORAL ONCE
Qty: 2 TABLET | Refills: 0 | Status: SHIPPED | OUTPATIENT
Start: 2020-03-10 | End: 2020-03-10

## 2020-03-10 NOTE — ASSESSMENT & PLAN NOTE
Patient will like to start a family, she plans to stop OCPs  - Advised to start OTC folic acid 873 mcg daily and report to PCP as soon as she misses a period

## 2020-03-10 NOTE — PROGRESS NOTES
Assessment/Plan:       Problem List Items Addressed This Visit        Genitourinary    Vaginal candidiasis - Primary     Hyphae observed on wet mount  - Affirm testing ordered    - Will treat for vulvovaginal candidiasis with Diflucan 150 mg x 1  - Patient encouraged to repeat treatment after 3 days if symptoms persist         Relevant Medications    fluconazole (DIFLUCAN) 150 mg tablet       Other    Encounter for screening examination for chlamydial infection     - Due for chlamydia screening, ordered         Relevant Orders    Chlamydia/GC amplified DNA by PCR    Desire for pregnancy     Patient will like to start a family, she plans to stop OCPs  - Advised to start OTC folic acid 665 mcg daily and report to PCP as soon as she misses a period             Other Visit Diagnoses     Screening for STD (sexually transmitted disease)        Relevant Orders    VAGINOSIS DNA PROBE (AFFIRM)            Subjective:      Patient ID: Chepe Pickett is a 22 y o  female  Vaginal Discharge   The patient's primary symptoms include vaginal discharge  Primary symptoms comment: yellowish  This is a new problem  Episode onset: one week  The problem occurs constantly  The problem has been unchanged  The patient is experiencing no pain  She is not pregnant (LMP-02/17/2020 on OCPs for over 10 years, no missed doses)  Pertinent negatives include no abdominal pain, chills, constipation, diarrhea, dysuria, fever, hematuria, nausea, painful intercourse or vomiting  The vaginal discharge was yellow, thick and malodorous  Sexually active with male partners  Has no sexual activity in the past 3 years  Pt reports symptoms appears different from that which she gets from her previous yeast infection  Reports History of Herpes last year  Denies hx of other STDs  Denies Hx of Diabetes    Pt plans to stop her OCP as she desires to start a family         The following portions of the patient's history were reviewed and updated as appropriate: She  has a past medical history of Chest pain, Menstrual cramps (11/14/2018), Nephrolithiasis (7/12/2018), Palpitations, Pelvic pain (4/30/2015), and Syncope  She   Patient Active Problem List    Diagnosis Date Noted    Vaginal candidiasis 03/10/2020    Desire for pregnancy 03/10/2020    Syncope 12/10/2019    Encounter for screening examination for chlamydial infection 07/24/2019    Hypertriglyceridemia without hypercholesterolemia 07/10/2019    HSV-2 infection 09/19/2018    Chronic pain of right ankle 04/12/2018    Vasovagal syncope 04/12/2018    Depression with anxiety 04/12/2018    Dysmenorrhea 04/12/2018    Acne vulgaris 09/20/2017    Keratosis pilaris 09/20/2017    Back pain 10/16/2014    Obesity 08/07/2014     She  has no past surgical history on file  Her family history includes Cancer in her paternal aunt; Diabetes in her father, maternal grandfather, and paternal grandfather; No Known Problems in her mother  She  reports that she has never smoked  She has never used smokeless tobacco  She reports that she does not drink alcohol or use drugs  Current Outpatient Medications   Medication Sig Dispense Refill    benzoyl peroxide 5 % gel Apply topically 2 (two) times a day 60 g 3    CRYSELLE-28 0 3-30 MG-MCG per tablet TOME SALONI TABLETA TODOS LOS GARCIA 28 tablet 1    FLUoxetine (PROzac) 20 mg capsule Take 2 capsules (40 mg total) by mouth daily (Patient taking differently: Take 20 mg by mouth every morning ) 180 capsule 1    hydrOXYzine pamoate (VISTARIL) 25 mg capsule Take 1 capsule (25 mg total) by mouth 3 (three) times a day as needed for anxiety 90 capsule 1    ibuprofen (MOTRIN) 600 mg tablet Take 1 tablet (600 mg total) by mouth every 6 (six) hours as needed for moderate pain 30 tablet 2    fluconazole (DIFLUCAN) 150 mg tablet Take 1 tablet (150 mg total) by mouth once for 1 dose 2 tablet 0     No current facility-administered medications for this visit        Current Outpatient Medications on File Prior to Visit   Medication Sig    benzoyl peroxide 5 % gel Apply topically 2 (two) times a day    CRYSELLE-28 0 3-30 MG-MCG per tablet CESIA GARCIA    FLUoxetine (PROzac) 20 mg capsule Take 2 capsules (40 mg total) by mouth daily (Patient taking differently: Take 20 mg by mouth every morning )    hydrOXYzine pamoate (VISTARIL) 25 mg capsule Take 1 capsule (25 mg total) by mouth 3 (three) times a day as needed for anxiety    ibuprofen (MOTRIN) 600 mg tablet Take 1 tablet (600 mg total) by mouth every 6 (six) hours as needed for moderate pain    [DISCONTINUED] valACYclovir (VALTREX) 500 mg tablet TAKE 1 TABLET BY ORAL ROUTE 2 TIMES EVERY DAY AS NEEDED FOR FUTURE OUTBREAKS     No current facility-administered medications on file prior to visit  She has No Known Allergies       Review of Systems   Constitutional: Negative for chills and fever  Gastrointestinal: Negative for abdominal pain, constipation, diarrhea, nausea and vomiting  Genitourinary: Positive for vaginal discharge  Negative for dysuria and hematuria  Objective:      /68 (BP Location: Left arm, Patient Position: Sitting, Cuff Size: Large)   Pulse 76   Temp 98 1 °F (36 7 °C) (Tympanic)   Resp 18   Wt 111 kg (243 lb 12 8 oz)   BMI 38 18 kg/m²          Physical Exam   Constitutional: She appears well-developed and well-nourished  Obese   HENT:   Head: Normocephalic and atraumatic  Eyes: EOM are normal    Neck: Normal range of motion  Cardiovascular: Normal rate, regular rhythm, normal heart sounds and intact distal pulses  Pulmonary/Chest: Effort normal and breath sounds normal    Abdominal: Soft  Bowel sounds are normal    Genitourinary: Uterus normal  Cervix exhibits discharge  Cervix exhibits no motion tenderness  Right adnexum displays no tenderness  Left adnexum displays no tenderness  No erythema, tenderness or bleeding in the vagina  No foreign body in the vagina   Vaginal discharge found  Musculoskeletal: Normal range of motion  Neurological: She is alert  Skin: Skin is warm and dry  Psychiatric: She has a normal mood and affect  Vitals reviewed

## 2020-03-10 NOTE — ASSESSMENT & PLAN NOTE
Hyphae observed on wet mount  - Affirm testing ordered    - Will treat for vulvovaginal candidiasis with Diflucan 150 mg x 1  - Patient encouraged to repeat treatment after 3 days if symptoms persist

## 2020-03-10 NOTE — PATIENT INSTRUCTIONS
Vulvovaginal Candidiasis   WHAT YOU NEED TO KNOW:   What is vulvovaginal candidiasis? Vulvovaginal candidiasis, or yeast infection, is a common vaginal infection  Vulvovaginal candidiasis is caused by a fungus, or yeast-like germ  Fungi are normally found in your vagina  When there are too many fungi, it can cause an infection  What increases my risk for vulvovaginal candidiasis? · Pregnancy    · Medical conditions that suppress your immune system, such as diabetes or HIV and AIDS    · Medicines, such as antibiotics, birth control pills, or steroid medicine    · Contraceptive devices, such as diaphragms, sponges, and intrauterine devices  What are the signs and symptoms of vulvovaginal candidiasis? · Thick, white, cheese-like discharge from your vagina    · Itching, swelling, or redness in your vagina    · Burning when you urinate  How is vulvovaginal candidiasis diagnosed? Your healthcare provider will ask about your medical history and examine you  A sample of your vaginal discharge may show what germ is causing your infection  How is vulvovaginal candidiasis treated? Medicines help treat the fungal infection and decrease inflammation  The medicine may be a pill, topical cream, or vaginal suppository  With treatment, the infection is usually gone within a week: How can I manage my symptoms? · Wear cotton underwear  · Keep the vaginal area clean and dry  · Wipe from front to back after you urinate or have a bowel movement  · Do not have sex until your symptoms are gone  · Do not douche  · Do not use strong perfumes or soaps  · Do not use feminine hygiene sprays, powders, or bubble bath  How can I prevent another infection? · Take showers instead of baths  · Eat yogurt  · Limit the amount of alcohol you drink  · Limit your time in hot tubs  · Control your blood sugar if you are diabetic  When should I seek immediate care? · You have fever and chills      · You are bleeding from your vagina and it is not your monthly period  · You develop abdominal or pelvic pain  When should I contact my healthcare provider? · Your signs and symptoms get worse, even after treatment  · You have questions or concerns about your condition or care  CARE AGREEMENT:   You have the right to help plan your care  Learn about your health condition and how it may be treated  Discuss treatment options with your caregivers to decide what care you want to receive  You always have the right to refuse treatment  The above information is an  only  It is not intended as medical advice for individual conditions or treatments  Talk to your doctor, nurse or pharmacist before following any medical regimen to see if it is safe and effective for you  © 2017 [employer] Information is for End User's use only and may not be sold, redistributed or otherwise used for commercial purposes  All illustrations and images included in CareNotes® are the copyrighted property of [employer]  or [employer]

## 2020-03-12 ENCOUNTER — TELEPHONE (OUTPATIENT)
Dept: OTHER | Facility: HOSPITAL | Age: 25
End: 2020-03-12

## 2020-03-12 LAB
C TRACH DNA SPEC QL NAA+PROBE: NEGATIVE
CANDIDA RRNA VAG QL PROBE: NEGATIVE
G VAGINALIS RRNA GENITAL QL PROBE: NEGATIVE
N GONORRHOEA DNA SPEC QL NAA+PROBE: NEGATIVE
T VAGINALIS RRNA GENITAL QL PROBE: NEGATIVE

## 2020-04-16 DIAGNOSIS — N94.6 MENSTRUAL CRAMPS: ICD-10-CM

## 2020-04-16 RX ORDER — IBUPROFEN 600 MG/1
600 TABLET ORAL EVERY 6 HOURS PRN
Qty: 30 TABLET | Refills: 2 | Status: SHIPPED | OUTPATIENT
Start: 2020-04-16 | End: 2020-11-09

## 2020-04-23 DIAGNOSIS — N94.6 DYSMENORRHEA: ICD-10-CM

## 2020-06-04 ENCOUNTER — OFFICE VISIT (OUTPATIENT)
Dept: FAMILY MEDICINE CLINIC | Facility: CLINIC | Age: 25
End: 2020-06-04

## 2020-06-04 ENCOUNTER — TELEPHONE (OUTPATIENT)
Dept: FAMILY MEDICINE CLINIC | Facility: CLINIC | Age: 25
End: 2020-06-04

## 2020-06-04 VITALS
BODY MASS INDEX: 36.88 KG/M2 | DIASTOLIC BLOOD PRESSURE: 72 MMHG | WEIGHT: 235 LBS | RESPIRATION RATE: 16 BRPM | HEIGHT: 67 IN | TEMPERATURE: 98.3 F | HEART RATE: 74 BPM | SYSTOLIC BLOOD PRESSURE: 118 MMHG

## 2020-06-04 DIAGNOSIS — Z30.09 BIRTH CONTROL COUNSELING: ICD-10-CM

## 2020-06-04 DIAGNOSIS — L60.0 INGROWN RIGHT BIG TOENAIL: Primary | ICD-10-CM

## 2020-06-04 PROCEDURE — 3008F BODY MASS INDEX DOCD: CPT | Performed by: FAMILY MEDICINE

## 2020-06-04 PROCEDURE — 99213 OFFICE O/P EST LOW 20 MIN: CPT | Performed by: FAMILY MEDICINE

## 2020-06-04 PROCEDURE — 1036F TOBACCO NON-USER: CPT | Performed by: FAMILY MEDICINE

## 2020-06-15 ENCOUNTER — OFFICE VISIT (OUTPATIENT)
Dept: PODIATRY | Facility: CLINIC | Age: 25
End: 2020-06-15
Payer: COMMERCIAL

## 2020-06-15 VITALS — BODY MASS INDEX: 36.88 KG/M2 | HEIGHT: 67 IN | WEIGHT: 235 LBS

## 2020-06-15 DIAGNOSIS — L60.0 INGROWN RIGHT BIG TOENAIL: Primary | ICD-10-CM

## 2020-06-15 PROCEDURE — 3008F BODY MASS INDEX DOCD: CPT | Performed by: PODIATRIST

## 2020-06-15 PROCEDURE — 99202 OFFICE O/P NEW SF 15 MIN: CPT | Performed by: PODIATRIST

## 2020-06-15 PROCEDURE — 1036F TOBACCO NON-USER: CPT | Performed by: PODIATRIST

## 2020-06-15 PROCEDURE — 11750 EXCISION NAIL&NAIL MATRIX: CPT | Performed by: PODIATRIST

## 2020-06-15 RX ORDER — LIDOCAINE HYDROCHLORIDE 10 MG/ML
3 INJECTION, SOLUTION INFILTRATION; PERINEURAL ONCE
Status: COMPLETED | OUTPATIENT
Start: 2020-06-15 | End: 2020-06-15

## 2020-06-15 RX ADMIN — LIDOCAINE HYDROCHLORIDE 3 ML: 10 INJECTION, SOLUTION INFILTRATION; PERINEURAL at 08:35

## 2020-06-24 ENCOUNTER — OFFICE VISIT (OUTPATIENT)
Dept: FAMILY MEDICINE CLINIC | Facility: CLINIC | Age: 25
End: 2020-06-24

## 2020-06-24 VITALS
DIASTOLIC BLOOD PRESSURE: 70 MMHG | TEMPERATURE: 98.7 F | RESPIRATION RATE: 18 BRPM | WEIGHT: 232 LBS | BODY MASS INDEX: 36.34 KG/M2 | SYSTOLIC BLOOD PRESSURE: 102 MMHG | HEART RATE: 64 BPM

## 2020-06-24 DIAGNOSIS — N89.8 VAGINAL DISCHARGE: Primary | ICD-10-CM

## 2020-06-24 DIAGNOSIS — R30.0 DYSURIA: ICD-10-CM

## 2020-06-24 LAB
SL AMB  POCT GLUCOSE, UA: NEGATIVE
SL AMB LEUKOCYTE ESTERASE,UA: NEGATIVE
SL AMB POCT BILIRUBIN,UA: NEGATIVE
SL AMB POCT BLOOD,UA: ABNORMAL
SL AMB POCT CLARITY,UA: CLEAR
SL AMB POCT COLOR,UA: YELLOW
SL AMB POCT KETONES,UA: NEGATIVE
SL AMB POCT NITRITE,UA: NEGATIVE
SL AMB POCT PH,UA: 6
SL AMB POCT SPECIFIC GRAVITY,UA: 1.01
SL AMB POCT URINE PROTEIN: NEGATIVE
SL AMB POCT UROBILINOGEN: 0.2

## 2020-06-24 PROCEDURE — 99213 OFFICE O/P EST LOW 20 MIN: CPT | Performed by: FAMILY MEDICINE

## 2020-06-24 PROCEDURE — 1036F TOBACCO NON-USER: CPT | Performed by: FAMILY MEDICINE

## 2020-06-24 PROCEDURE — 81002 URINALYSIS NONAUTO W/O SCOPE: CPT | Performed by: FAMILY MEDICINE

## 2020-06-24 RX ORDER — FLUCONAZOLE 150 MG/1
150 TABLET ORAL ONCE
Qty: 1 TABLET | Refills: 0 | Status: SHIPPED | OUTPATIENT
Start: 2020-06-24 | End: 2020-06-24

## 2020-06-29 ENCOUNTER — OFFICE VISIT (OUTPATIENT)
Dept: PODIATRY | Facility: CLINIC | Age: 25
End: 2020-06-29
Payer: COMMERCIAL

## 2020-06-29 VITALS
HEIGHT: 67 IN | SYSTOLIC BLOOD PRESSURE: 123 MMHG | DIASTOLIC BLOOD PRESSURE: 70 MMHG | WEIGHT: 230 LBS | BODY MASS INDEX: 36.1 KG/M2

## 2020-06-29 DIAGNOSIS — L60.0 INGROWN RIGHT BIG TOENAIL: Primary | ICD-10-CM

## 2020-06-29 PROCEDURE — 99212 OFFICE O/P EST SF 10 MIN: CPT | Performed by: PODIATRIST

## 2020-06-29 PROCEDURE — 3008F BODY MASS INDEX DOCD: CPT | Performed by: PODIATRIST

## 2020-06-29 PROCEDURE — 1036F TOBACCO NON-USER: CPT | Performed by: PODIATRIST

## 2020-06-29 PROCEDURE — 3008F BODY MASS INDEX DOCD: CPT | Performed by: FAMILY MEDICINE

## 2020-06-29 RX ORDER — FLUCONAZOLE 150 MG/1
150 TABLET ORAL ONCE
COMMUNITY
Start: 2020-06-24 | End: 2021-01-13

## 2020-09-15 ENCOUNTER — TELEPHONE (OUTPATIENT)
Dept: FAMILY MEDICINE CLINIC | Facility: CLINIC | Age: 25
End: 2020-09-15

## 2020-09-16 ENCOUNTER — ANNUAL EXAM (OUTPATIENT)
Dept: FAMILY MEDICINE CLINIC | Facility: CLINIC | Age: 25
End: 2020-09-16

## 2020-09-16 VITALS
BODY MASS INDEX: 35 KG/M2 | DIASTOLIC BLOOD PRESSURE: 70 MMHG | TEMPERATURE: 96.4 F | HEIGHT: 67 IN | WEIGHT: 223 LBS | RESPIRATION RATE: 16 BRPM | SYSTOLIC BLOOD PRESSURE: 118 MMHG | HEART RATE: 82 BPM

## 2020-09-16 DIAGNOSIS — L70.0 ACNE VULGARIS: ICD-10-CM

## 2020-09-16 DIAGNOSIS — N89.8 VAGINAL DISCHARGE: Primary | ICD-10-CM

## 2020-09-16 PROBLEM — B96.89 BACTERIAL VAGINITIS: Status: ACTIVE | Noted: 2020-09-16

## 2020-09-16 PROBLEM — N76.0 BACTERIAL VAGINITIS: Status: ACTIVE | Noted: 2020-09-16

## 2020-09-16 PROCEDURE — 3008F BODY MASS INDEX DOCD: CPT | Performed by: NURSE PRACTITIONER

## 2020-09-16 PROCEDURE — 1036F TOBACCO NON-USER: CPT | Performed by: NURSE PRACTITIONER

## 2020-09-16 PROCEDURE — 87510 GARDNER VAG DNA DIR PROBE: CPT | Performed by: NURSE PRACTITIONER

## 2020-09-16 PROCEDURE — 87660 TRICHOMONAS VAGIN DIR PROBE: CPT | Performed by: NURSE PRACTITIONER

## 2020-09-16 PROCEDURE — 87491 CHLMYD TRACH DNA AMP PROBE: CPT | Performed by: NURSE PRACTITIONER

## 2020-09-16 PROCEDURE — 87591 N.GONORRHOEAE DNA AMP PROB: CPT | Performed by: NURSE PRACTITIONER

## 2020-09-16 PROCEDURE — 99213 OFFICE O/P EST LOW 20 MIN: CPT | Performed by: NURSE PRACTITIONER

## 2020-09-16 PROCEDURE — 87480 CANDIDA DNA DIR PROBE: CPT | Performed by: NURSE PRACTITIONER

## 2020-09-16 NOTE — PATIENT INSTRUCTIONS
Refrain from intercourse until treatment is completed and symptoms resolve  I will call you if we need to make any changes to treatment

## 2020-09-16 NOTE — PROGRESS NOTES
Assessment/Plan:    Acne vulgaris  Refilled benzoyl peroxide gel    Vaginal discharge  Thick, yellow discharge noted on specula exam  Foul smelling  Affirm taken and dis cultures for GC  In a monogamous relationship, patient agreed to get STI testing  Was recently on Diflucan  Will wait to get affirm back, most likely BV and will treat if culture verifies BV  Patient agrees with this plan         Problem List Items Addressed This Visit        Musculoskeletal and Integument    Acne vulgaris     Refilled benzoyl peroxide gel            Other    Vaginal discharge - Primary     Thick, yellow discharge noted on specula exam  Foul smelling  Affirm taken and dis cultures for GC  In a monogamous relationship, patient agreed to get STI testing  Was recently on Diflucan  Will wait to get affirm back, most likely BV and will treat if culture verifies BV  Patient agrees with this plan                 Subjective:      Patient ID: Rome Coombs is a 22 y o  female  22year old presents c/o vaginal discharge for the past week that is foul smelling  Sexually active, uses condoms  Denies urinary discomfort  Uses OC for birth control  In a monogamous relationship     [id number] cell    Has acne on chin and would like refill of medication  The following portions of the patient's history were reviewed and updated as appropriate: allergies, current medications, past family history, past medical history, past social history, past surgical history and problem list     Review of Systems   Constitutional: Negative  Genitourinary: Positive for vaginal discharge (thick foul smelling)  Negative for difficulty urinating, dyspareunia and dysuria  Skin:        Pimples on chin, medication is effective and wants a refill  Benzoyl peroxide  All other systems reviewed and are negative          Objective:      /70   Pulse 82   Temp (!) 96 4 °F (35 8 °C)   Resp 16   Ht 5' 7" (1 702 m)   Wt 101 kg (223 lb)   BMI 34 93 kg/m²          Physical Exam  Exam conducted with a chaperone present  Constitutional:       Appearance: Normal appearance  Cardiovascular:      Rate and Rhythm: Normal rate and regular rhythm  Pulmonary:      Effort: Pulmonary effort is normal       Breath sounds: Normal breath sounds  Genitourinary:      Skin:     General: Skin is warm and dry  Neurological:      Mental Status: She is alert

## 2020-09-16 NOTE — ASSESSMENT & PLAN NOTE
Thick, yellow discharge noted on specula exam  Foul smelling  Affirm taken and dis cultures for GC  In a monogamous relationship, patient agreed to get STI testing  Was recently on Diflucan  Will wait to get affirm back, most likely BV and will treat if culture verifies BV    Patient agrees with this plan

## 2020-09-17 ENCOUNTER — TELEPHONE (OUTPATIENT)
Dept: FAMILY MEDICINE CLINIC | Facility: CLINIC | Age: 25
End: 2020-09-17

## 2020-09-17 NOTE — TELEPHONE ENCOUNTER
TC to advise that cultures came back negative and no treatment is advised  Patient states discharge resolving as well as odor

## 2020-09-18 LAB
C TRACH DNA SPEC QL NAA+PROBE: NEGATIVE
N GONORRHOEA DNA SPEC QL NAA+PROBE: NEGATIVE

## 2020-10-28 ENCOUNTER — OFFICE VISIT (OUTPATIENT)
Dept: FAMILY MEDICINE CLINIC | Facility: CLINIC | Age: 25
End: 2020-10-28

## 2020-10-28 VITALS
DIASTOLIC BLOOD PRESSURE: 70 MMHG | BODY MASS INDEX: 34.25 KG/M2 | TEMPERATURE: 97.6 F | SYSTOLIC BLOOD PRESSURE: 110 MMHG | HEART RATE: 78 BPM | RESPIRATION RATE: 18 BRPM | WEIGHT: 218.2 LBS | OXYGEN SATURATION: 99 % | HEIGHT: 67 IN

## 2020-10-28 DIAGNOSIS — R11.2 NAUSEA AND VOMITING, INTRACTABILITY OF VOMITING NOT SPECIFIED, UNSPECIFIED VOMITING TYPE: ICD-10-CM

## 2020-10-28 DIAGNOSIS — Z3A.01 LESS THAN 8 WEEKS GESTATION OF PREGNANCY: Primary | ICD-10-CM

## 2020-10-28 LAB — SL AMB POCT URINE HCG: NEGATIVE

## 2020-10-28 PROCEDURE — 99213 OFFICE O/P EST LOW 20 MIN: CPT | Performed by: FAMILY MEDICINE

## 2020-10-28 PROCEDURE — 81025 URINE PREGNANCY TEST: CPT | Performed by: FAMILY MEDICINE

## 2020-10-28 PROCEDURE — 3008F BODY MASS INDEX DOCD: CPT | Performed by: FAMILY MEDICINE

## 2020-11-07 DIAGNOSIS — N94.6 MENSTRUAL CRAMPS: ICD-10-CM

## 2020-11-09 RX ORDER — IBUPROFEN 600 MG/1
600 TABLET ORAL EVERY 6 HOURS PRN
Qty: 30 TABLET | Refills: 2 | Status: SHIPPED | OUTPATIENT
Start: 2020-11-09 | End: 2022-01-25 | Stop reason: SDUPTHER

## 2021-01-13 ENCOUNTER — OFFICE VISIT (OUTPATIENT)
Dept: FAMILY MEDICINE CLINIC | Facility: CLINIC | Age: 26
End: 2021-01-13

## 2021-01-13 VITALS
BODY MASS INDEX: 35.37 KG/M2 | SYSTOLIC BLOOD PRESSURE: 100 MMHG | TEMPERATURE: 98.4 F | WEIGHT: 225.8 LBS | DIASTOLIC BLOOD PRESSURE: 70 MMHG | OXYGEN SATURATION: 99 % | RESPIRATION RATE: 16 BRPM | HEART RATE: 80 BPM

## 2021-01-13 DIAGNOSIS — R19.7 DIARRHEA, UNSPECIFIED TYPE: Primary | ICD-10-CM

## 2021-01-13 DIAGNOSIS — B00.2 HERPES VIRUS INFECTION OF ORAL MUCOSA: ICD-10-CM

## 2021-01-13 DIAGNOSIS — R10.13 EPIGASTRIC PAIN: ICD-10-CM

## 2021-01-13 PROCEDURE — 1036F TOBACCO NON-USER: CPT | Performed by: FAMILY MEDICINE

## 2021-01-13 PROCEDURE — 99213 OFFICE O/P EST LOW 20 MIN: CPT | Performed by: FAMILY MEDICINE

## 2021-01-13 RX ORDER — LIDOCAINE HYDROCHLORIDE 20 MG/ML
10 SOLUTION OROPHARYNGEAL 3 TIMES DAILY PRN
Qty: 100 ML | Refills: 2 | Status: SHIPPED | OUTPATIENT
Start: 2021-01-13 | End: 2021-02-06 | Stop reason: SDUPTHER

## 2021-01-13 NOTE — PROGRESS NOTES
Assessment/Plan:    1  Diarrhea, unspecified type  2  Epigastric pain   Serena Clark is having several months of nausea, abdominal pain, and diarrhea with all oral intake  This is new, and she has no inciting factors  Patient would be best suited to be seen by Gastroenterology for possible endoscopy as well as colonoscopy  Referral provided to her today to Dr Eva Yancey  She can call with any questions or concerns  Discuss possible medications, but declined them until she is seen by the specialist   - Ambulatory referral to Gastroenterology; Future      3  Herpes virus infection of oral mucosa    She also has a known history of HSV 2 infection, with small papules around her oral mucosa  Refill provided of her viscous lidocaine   - Lidocaine Viscous HCl (XYLOCAINE) 2 % mucosal solution; Swish and spit 10 mL 3 (three) times a day as needed for mouth pain or discomfort  Dispense: 100 mL; Refill: 2        _________________________________________________________  Subjective:    Patient ID: Maisha Mejia is a 26 y o  female  HPI  Maisha Mejia is a pleasant 61-year-old female with a history of HSV 2 infection, and obesity, who presents today to discuss change in bowel habits  She states for 5 months since roughly July she has been having diarrhea within an hour of all food and drink intake  She has never had an issue with regular bowel movements, no history of lactose or gluten intolerance  No family history of digestive issues  No known inciting factor such as antibiotic usage  Per chart review she has lost 25 lb since a year ago, and states she was not attempting to do so  She eats fruits, vegetables, breads, proteins, and ensure shakes, and each of them resulting in loose brown diarrhea without blood  She has not tried any medications or over-the-counter options  She also reports with these episodes she regularly feels nauseous, but does not vomit    She also reports that they are accompanied with severe epigastric pain  No known asthma, or skin hypersensitivity issues  History of HSV-2 and use of lidocaine for breakouts around lips  She has a picture on her phone of her viscous lidocaine 100 mL bottle, and states she would like a refill of it  She regularly has outbreaks around her lips, but denies any oral lesions  States they can be quite painful  The following portions of the patient's history were reviewed and updated as appropriate: allergies, current medications, past medical history and problem list     Review of Systems   Constitutional: Positive for unexpected weight change  Negative for chills, diaphoresis and fever  HENT: Negative for mouth sores, sore throat and trouble swallowing  Lip sores   Respiratory: Negative for cough and shortness of breath  Cardiovascular: Negative for chest pain and palpitations  Gastrointestinal: Positive for abdominal pain, diarrhea and nausea  Negative for blood in stool, constipation and vomiting  Genitourinary: Negative for dysuria and frequency  Musculoskeletal: Negative for arthralgias and back pain  Skin: Negative for rash and wound  Neurological: Negative for weakness and numbness  Psychiatric/Behavioral: Negative for dysphoric mood  The patient is not nervous/anxious  Objective:  Vitals:    01/13/21 1013   BP: 100/70   Pulse: 80   Resp: 16   Temp: 98 4 °F (36 9 °C)   SpO2: 99%      Physical Exam  Vitals signs reviewed  Constitutional:       General: She is not in acute distress  Appearance: Normal appearance  She is well-developed  She is obese  She is not ill-appearing  HENT:      Head: Normocephalic and atraumatic  Mouth/Throat:      Mouth: Mucous membranes are moist       Pharynx: Oropharynx is clear  No oropharyngeal exudate  Comments:   Small papules on outer lips bilaterally, no erythema or discharge  No intraoral lesions  Eyes:      General: No scleral icterus  Right eye: No discharge  Left eye: No discharge  Neck:      Musculoskeletal: Normal range of motion  Cardiovascular:      Rate and Rhythm: Normal rate and regular rhythm  Pulses: Normal pulses  Heart sounds: Normal heart sounds  No murmur  No friction rub  Pulmonary:      Effort: Pulmonary effort is normal  No respiratory distress  Breath sounds: Normal breath sounds  No stridor  No wheezing  Abdominal:      General: Abdomen is flat  Bowel sounds are normal  There is no distension  Palpations: Abdomen is soft  There is no mass  Tenderness: There is abdominal tenderness (epigastric bilaterally)  Skin:     General: Skin is warm and dry  Findings: No rash ( over hand, neck or face)  Neurological:      Mental Status: She is alert and oriented to person, place, and time  Psychiatric:         Mood and Affect: Mood normal          Behavior: Behavior normal          Thought Content: Thought content normal          Judgment: Judgment normal            Portions of the record may have been created with voice recognition software  Occasional wrong word or "sound alike" substitutions may have occurred due to the inherent limitations of voice recognition software  Please review the chart carefully and recognize, using context, where substitutions/typographical errors may have occurred

## 2021-02-06 DIAGNOSIS — B00.2 HERPES VIRUS INFECTION OF ORAL MUCOSA: ICD-10-CM

## 2021-02-08 RX ORDER — LIDOCAINE HYDROCHLORIDE 20 MG/ML
10 SOLUTION OROPHARYNGEAL 3 TIMES DAILY PRN
Qty: 100 ML | Refills: 0 | Status: SHIPPED | OUTPATIENT
Start: 2021-02-08 | End: 2022-04-22 | Stop reason: SDUPTHER

## 2021-03-24 DIAGNOSIS — L70.0 ACNE VULGARIS: ICD-10-CM

## 2021-04-14 ENCOUNTER — ANNUAL EXAM (OUTPATIENT)
Dept: FAMILY MEDICINE CLINIC | Facility: CLINIC | Age: 26
End: 2021-04-14

## 2021-04-14 VITALS
WEIGHT: 220.2 LBS | TEMPERATURE: 98.6 F | HEART RATE: 82 BPM | HEIGHT: 67 IN | DIASTOLIC BLOOD PRESSURE: 62 MMHG | OXYGEN SATURATION: 98 % | BODY MASS INDEX: 34.56 KG/M2 | SYSTOLIC BLOOD PRESSURE: 100 MMHG | RESPIRATION RATE: 18 BRPM

## 2021-04-14 DIAGNOSIS — E78.1 HYPERTRIGLYCERIDEMIA WITHOUT HYPERCHOLESTEROLEMIA: ICD-10-CM

## 2021-04-14 DIAGNOSIS — N92.6 MENSTRUAL ABNORMALITY: Primary | ICD-10-CM

## 2021-04-14 LAB — SL AMB POCT URINE HCG: NEGATIVE

## 2021-04-14 PROCEDURE — 3008F BODY MASS INDEX DOCD: CPT | Performed by: FAMILY MEDICINE

## 2021-04-14 PROCEDURE — 1036F TOBACCO NON-USER: CPT | Performed by: FAMILY MEDICINE

## 2021-04-14 PROCEDURE — 81025 URINE PREGNANCY TEST: CPT | Performed by: FAMILY MEDICINE

## 2021-04-14 PROCEDURE — 99213 OFFICE O/P EST LOW 20 MIN: CPT | Performed by: FAMILY MEDICINE

## 2021-04-14 NOTE — PROGRESS NOTES
Assessment/Plan:    Menstrual abnormality  - Patient reports having changes to her menstrual period since February  She reports having her period for 3 days in February and this past month she only had spotting for a day in April   - Of note, patient reports getting off birth control pills in March  - POCT pregnancy test is Negative (04/14/2021)  - Advised patient to keep a menstrual diary for the next few months and recommended lifestyle modification such as weight loss, healthy diet and avoid smoking and drinking alcohol   - Ordered CBC, BMP, TSH and Lipid panel to rule out metabolic syndrome    - Follow up in  3 month  Hypertriglyceridemia without hypercholesterolemia  - Last Lipid Panel in 2019 reviewed  Labs significant for hypertriglyceridemia   - Ordered lipid panel  - Recommended lifestyle modifications such as weight loss, healthy diet        Diagnoses and all orders for this visit:    Menstrual abnormality  -     POCT urine HCG  -     CBC and differential; Future  -     Comprehensive metabolic panel; Future  -     TSH, 3rd generation with Free T4 reflex; Future    Hypertriglyceridemia without hypercholesterolemia  -     Lipid panel; Future          Subjective:      Patient ID: Carlo Gaines is a 26 y o  female who presents to the clinic today due to complains of irregular periods and dizziness  Patient reports noticing changes to her period since February which lasted only 3 days and this month, patient only noticed spotting for a day and no periods  Of note, patient recently stopped taking birth control  in March  Prior to february patient has had regular periods lasting 5 days, denies pelvic pain or cramping, passing blood clot  Patient also desires to get pregnant, has been trying for the past two months, her significant other lives in hospitals and she has been there visiting him and just returned about two weeks ago  She also feels sad about being away from partner   She denies feeling depressed but endorses to not feeling like doing anything  Review of symptom  is positive for dizziness, but denies chest pain, shortness of breath, fever, chills, abdominal pain, bowel or urinary problems  The following portions of the patient's history were reviewed and updated as appropriate: allergies, current medications, past family history, past medical history, past social history, past surgical history and problem list     Review of Systems   Constitutional: Negative for appetite change, chills and fever  Respiratory: Negative for chest tightness and shortness of breath  Cardiovascular: Negative for chest pain and palpitations  Gastrointestinal: Negative  Negative for abdominal pain, blood in stool, constipation, nausea and vomiting  Genitourinary: Positive for menstrual problem  Negative for difficulty urinating, dysuria and urgency  Musculoskeletal: Negative  Skin: Negative  Neurological: Positive for dizziness  Negative for light-headedness and headaches  Psychiatric/Behavioral: Negative  Objective:      /62 (BP Location: Right arm, Patient Position: Sitting, Cuff Size: Large)   Pulse 82   Temp 98 6 °F (37 °C) (Temporal)   Resp 18   Ht 5' 7" (1 702 m)   Wt 99 9 kg (220 lb 3 2 oz)   SpO2 98%   BMI 34 49 kg/m²          Physical Exam  Vitals signs reviewed  Constitutional:       General: She is not in acute distress  Appearance: Normal appearance  She is obese  She is not ill-appearing, toxic-appearing or diaphoretic  HENT:      Head: Normocephalic and atraumatic  Eyes:      Conjunctiva/sclera: Conjunctivae normal    Cardiovascular:      Rate and Rhythm: Normal rate and regular rhythm  Pulses: Normal pulses  Heart sounds: Normal heart sounds  No murmur  Pulmonary:      Effort: Pulmonary effort is normal  No respiratory distress  Breath sounds: Normal breath sounds  Abdominal:      General: Abdomen is flat   Bowel sounds are normal  There is no distension  Palpations: Abdomen is soft  Tenderness: There is no abdominal tenderness  There is no guarding  Musculoskeletal:      Right lower leg: No edema  Left lower leg: No edema  Skin:     General: Skin is warm and dry  Capillary Refill: Capillary refill takes less than 2 seconds  Neurological:      General: No focal deficit present  Mental Status: She is alert and oriented to person, place, and time  Mental status is at baseline  Psychiatric:         Mood and Affect: Mood normal          Behavior: Behavior normal          Thought Content:  Thought content normal          Judgment: Judgment normal

## 2021-04-14 NOTE — ASSESSMENT & PLAN NOTE
- Last Lipid Panel in 2019 reviewed   Labs significant for hypertriglyceridemia   - Ordered lipid panel  - Recommended lifestyle modifications such as weight loss, healthy diet

## 2021-04-14 NOTE — ASSESSMENT & PLAN NOTE
- Patient reports having changes to her menstrual period since February  She reports having her period for 3 days in February and this past month she only had spotting for a day in April   - Of note, patient reports getting off birth control pills in March  - POCT pregnancy test is Negative (04/14/2021)  - Advised patient to keep a menstrual diary for the next few months and recommended lifestyle modification such as weight loss, healthy diet and avoid smoking and drinking alcohol   - Ordered CBC, BMP, TSH and Lipid panel to rule out metabolic syndrome    - Follow up in  3 month

## 2021-04-15 ENCOUNTER — APPOINTMENT (OUTPATIENT)
Dept: LAB | Age: 26
End: 2021-04-15
Payer: COMMERCIAL

## 2021-04-15 DIAGNOSIS — N92.6 MENSTRUAL ABNORMALITY: ICD-10-CM

## 2021-04-15 DIAGNOSIS — E78.1 HYPERTRIGLYCERIDEMIA WITHOUT HYPERCHOLESTEROLEMIA: ICD-10-CM

## 2021-04-15 LAB
ALBUMIN SERPL BCP-MCNC: 3.3 G/DL (ref 3.5–5)
ALP SERPL-CCNC: 83 U/L (ref 46–116)
ALT SERPL W P-5'-P-CCNC: 35 U/L (ref 12–78)
ANION GAP SERPL CALCULATED.3IONS-SCNC: 3 MMOL/L (ref 4–13)
AST SERPL W P-5'-P-CCNC: 22 U/L (ref 5–45)
BASOPHILS # BLD AUTO: 0.04 THOUSANDS/ΜL (ref 0–0.1)
BASOPHILS NFR BLD AUTO: 0 % (ref 0–1)
BILIRUB SERPL-MCNC: 0.18 MG/DL (ref 0.2–1)
BUN SERPL-MCNC: 7 MG/DL (ref 5–25)
CALCIUM ALBUM COR SERPL-MCNC: 9.7 MG/DL (ref 8.3–10.1)
CALCIUM SERPL-MCNC: 9.1 MG/DL (ref 8.3–10.1)
CHLORIDE SERPL-SCNC: 104 MMOL/L (ref 100–108)
CHOLEST SERPL-MCNC: 208 MG/DL (ref 50–200)
CO2 SERPL-SCNC: 31 MMOL/L (ref 21–32)
CREAT SERPL-MCNC: 0.73 MG/DL (ref 0.6–1.3)
EOSINOPHIL # BLD AUTO: 0.14 THOUSAND/ΜL (ref 0–0.61)
EOSINOPHIL NFR BLD AUTO: 1 % (ref 0–6)
ERYTHROCYTE [DISTWIDTH] IN BLOOD BY AUTOMATED COUNT: 12.6 % (ref 11.6–15.1)
GFR SERPL CREATININE-BSD FRML MDRD: 133 ML/MIN/1.73SQ M
GLUCOSE P FAST SERPL-MCNC: 79 MG/DL (ref 65–99)
HCT VFR BLD AUTO: 42.5 % (ref 34.8–46.1)
HDLC SERPL-MCNC: 47 MG/DL
HGB BLD-MCNC: 13.1 G/DL (ref 11.5–15.4)
IMM GRANULOCYTES # BLD AUTO: 0.05 THOUSAND/UL (ref 0–0.2)
IMM GRANULOCYTES NFR BLD AUTO: 1 % (ref 0–2)
LDLC SERPL CALC-MCNC: 99 MG/DL (ref 0–100)
LYMPHOCYTES # BLD AUTO: 3.23 THOUSANDS/ΜL (ref 0.6–4.47)
LYMPHOCYTES NFR BLD AUTO: 30 % (ref 14–44)
MCH RBC QN AUTO: 30.1 PG (ref 26.8–34.3)
MCHC RBC AUTO-ENTMCNC: 30.8 G/DL (ref 31.4–37.4)
MCV RBC AUTO: 98 FL (ref 82–98)
MONOCYTES # BLD AUTO: 0.65 THOUSAND/ΜL (ref 0.17–1.22)
MONOCYTES NFR BLD AUTO: 6 % (ref 4–12)
NEUTROPHILS # BLD AUTO: 6.76 THOUSANDS/ΜL (ref 1.85–7.62)
NEUTS SEG NFR BLD AUTO: 62 % (ref 43–75)
NONHDLC SERPL-MCNC: 161 MG/DL
NRBC BLD AUTO-RTO: 0 /100 WBCS
PLATELET # BLD AUTO: 304 THOUSANDS/UL (ref 149–390)
PMV BLD AUTO: 10.6 FL (ref 8.9–12.7)
POTASSIUM SERPL-SCNC: 4.3 MMOL/L (ref 3.5–5.3)
PROT SERPL-MCNC: 7 G/DL (ref 6.4–8.2)
RBC # BLD AUTO: 4.35 MILLION/UL (ref 3.81–5.12)
SODIUM SERPL-SCNC: 138 MMOL/L (ref 136–145)
TRIGL SERPL-MCNC: 309 MG/DL
TSH SERPL DL<=0.05 MIU/L-ACNC: 1.96 UIU/ML (ref 0.36–3.74)
WBC # BLD AUTO: 10.87 THOUSAND/UL (ref 4.31–10.16)

## 2021-04-15 PROCEDURE — 80061 LIPID PANEL: CPT

## 2021-04-15 PROCEDURE — 84443 ASSAY THYROID STIM HORMONE: CPT

## 2021-04-15 PROCEDURE — 36415 COLL VENOUS BLD VENIPUNCTURE: CPT

## 2021-04-15 PROCEDURE — 80053 COMPREHEN METABOLIC PANEL: CPT

## 2021-04-15 PROCEDURE — 85025 COMPLETE CBC W/AUTO DIFF WBC: CPT

## 2021-08-13 ENCOUNTER — OFFICE VISIT (OUTPATIENT)
Dept: FAMILY MEDICINE CLINIC | Facility: CLINIC | Age: 26
End: 2021-08-13

## 2021-08-13 VITALS
TEMPERATURE: 98.6 F | HEART RATE: 91 BPM | WEIGHT: 235.2 LBS | DIASTOLIC BLOOD PRESSURE: 72 MMHG | OXYGEN SATURATION: 94 % | RESPIRATION RATE: 18 BRPM | SYSTOLIC BLOOD PRESSURE: 110 MMHG | BODY MASS INDEX: 36.91 KG/M2 | HEIGHT: 67 IN

## 2021-08-13 DIAGNOSIS — R35.0 INCREASED URINARY FREQUENCY: ICD-10-CM

## 2021-08-13 DIAGNOSIS — Z31.9 DESIRE FOR PREGNANCY: Primary | ICD-10-CM

## 2021-08-13 PROBLEM — Z78.9 ATTEMPTING TO CONCEIVE: Status: RESOLVED | Noted: 2021-08-13 | Resolved: 2021-08-13

## 2021-08-13 PROBLEM — Z78.9 ATTEMPTING TO CONCEIVE: Status: ACTIVE | Noted: 2021-08-13

## 2021-08-13 PROCEDURE — 99213 OFFICE O/P EST LOW 20 MIN: CPT | Performed by: FAMILY MEDICINE

## 2021-08-13 NOTE — ASSESSMENT & PLAN NOTE
Per patient she reports a desire to urinate every 3 hours   patient drinks  More than gallon of water a day   educated patient that in the setting of increased water intake it is not abnormal to  Urinate frequently   patient is otherwise asymptomatic including no burning micturition, vaginal discharge or bleeding      Per recent CMP, fasting glucose is 79   patient educated to monitor symptoms   will follow-up if symptoms worsen

## 2021-08-13 NOTE — ASSESSMENT & PLAN NOTE
Trying for pregnancy for 2 months now   patient encouraged to take prenatal vitamins with at least 248 mcg of folic acid   last menstrual period was 7/22   educated to call the office if she misses her period   current BMI is 36 84- encouraged healthy diet with at least 3 servings of fruits/vegetables a day  Encouraged physical activity- at least 30 minutes a day for 5 days a week

## 2021-08-13 NOTE — PROGRESS NOTES
Assessment/Plan:    Desire for pregnancy   Trying for pregnancy for 2 months now   patient encouraged to take prenatal vitamins with at least 131 mcg of folic acid   last menstrual period was 7/22   educated to call the office if she misses her period   current BMI is 36 84- encouraged healthy diet with at least 3 servings of fruits/vegetables a day  Encouraged physical activity- at least 30 minutes a day for 5 days a week    Increased urinary frequency  Per patient she reports a desire to urinate every 3 hours   patient drinks  More than gallon of water a day   educated patient that in the setting of increased water intake it is not abnormal to  Urinate frequently   patient is otherwise asymptomatic including no burning micturition, vaginal discharge or bleeding  Per recent CMP, fasting glucose is 79   patient educated to monitor symptoms   will follow-up if symptoms worsen       Diagnoses and all orders for this visit:    Desire for pregnancy        Subjective:      Patient ID: Jorge Luis Gamino is a 26 y o  female  60-year-old female is here to follow-up for increased urinary frequency  Patient initially schedule the appointment for possible vaginal infection but during the encounter she denies any vaginal discharge, vaginal pruritus, bleeding, burning micturition  Patient reports drinking more than a gallon of water a day  Patient reports she is trying to conceive for 2 months  Patient reports no physical activity  Patient had recent labs in April which were otherwise unremarkable except for increased cholesterol  Body mass index is 36 84 kg/m²  BMI Counseling: Body mass index is 36 84 kg/m²  The BMI is above normal  Nutrition recommendations include reducing portion sizes, decreasing overall calorie intake, 3-5 servings of fruits/vegetables daily, reducing fast food intake, consuming healthier snacks, decreasing soda and/or juice intake and moderation in carbohydrate intake      The following portions of the patient's history were reviewed and updated as appropriate:   She  has a past medical history of Chest pain, Menstrual cramps (11/14/2018), Nephrolithiasis (7/12/2018), Palpitations, Pelvic pain (4/30/2015), and Syncope  She   Patient Active Problem List    Diagnosis Date Noted    Increased urinary frequency 08/13/2021    Menstrual abnormality 04/14/2021    Bacterial vaginitis 09/16/2020    Vaginal discharge 06/24/2020    Dysuria 06/24/2020    Vaginal candidiasis 03/10/2020    Desire for pregnancy 03/10/2020    Syncope 12/10/2019    Encounter for screening examination for chlamydial infection 07/24/2019    Hypertriglyceridemia without hypercholesterolemia 07/10/2019    HSV-2 infection 09/19/2018    Chronic pain of right ankle 04/12/2018    Vasovagal syncope 04/12/2018    Depression with anxiety 04/12/2018    Dysmenorrhea 04/12/2018    Acne vulgaris 09/20/2017    Keratosis pilaris 09/20/2017    Back pain 10/16/2014    Obesity 08/07/2014     She  has no past surgical history on file  Her family history includes Cancer in her paternal aunt; Diabetes in her father, maternal grandfather, and paternal grandfather; No Known Problems in her mother  She  reports that she has never smoked  She has never used smokeless tobacco  She reports that she does not drink alcohol and does not use drugs    Current Outpatient Medications   Medication Sig Dispense Refill    benzoyl peroxide 5 % gel APPLY TO AFFECTED AREA TWICE A DAY 90 g 3    FLUoxetine (PROzac) 20 mg capsule Take 2 capsules (40 mg total) by mouth daily (Patient taking differently: Take 20 mg by mouth every morning ) 180 capsule 1    hydrOXYzine pamoate (VISTARIL) 25 mg capsule Take 1 capsule (25 mg total) by mouth 3 (three) times a day as needed for anxiety 90 capsule 1    ibuprofen (MOTRIN) 600 mg tablet TAKE 1 TABLET (600 MG TOTAL) BY MOUTH EVERY 6 (SIX) HOURS AS NEEDED FOR MODERATE PAIN 30 tablet 2    Lidocaine Viscous HCl (XYLOCAINE) 2 % mucosal solution Swish and spit 10 mL 3 (three) times a day as needed for mouth pain or discomfort 100 mL 0     No current facility-administered medications for this visit  Current Outpatient Medications on File Prior to Visit   Medication Sig    benzoyl peroxide 5 % gel APPLY TO AFFECTED AREA TWICE A DAY    FLUoxetine (PROzac) 20 mg capsule Take 2 capsules (40 mg total) by mouth daily (Patient taking differently: Take 20 mg by mouth every morning )    hydrOXYzine pamoate (VISTARIL) 25 mg capsule Take 1 capsule (25 mg total) by mouth 3 (three) times a day as needed for anxiety    ibuprofen (MOTRIN) 600 mg tablet TAKE 1 TABLET (600 MG TOTAL) BY MOUTH EVERY 6 (SIX) HOURS AS NEEDED FOR MODERATE PAIN    Lidocaine Viscous HCl (XYLOCAINE) 2 % mucosal solution Swish and spit 10 mL 3 (three) times a day as needed for mouth pain or discomfort     No current facility-administered medications on file prior to visit  She has No Known Allergies       Review of Systems   Constitutional: Negative for chills and fever  HENT: Negative for ear pain and sore throat  Eyes: Negative for pain and visual disturbance  Respiratory: Negative for cough and shortness of breath  Cardiovascular: Negative for chest pain and palpitations  Gastrointestinal: Negative for abdominal pain and vomiting  Genitourinary: Negative for decreased urine volume, difficulty urinating, dysuria, enuresis, flank pain, hematuria, menstrual problem, pelvic pain, urgency, vaginal bleeding, vaginal discharge and vaginal pain  Musculoskeletal: Negative for arthralgias and back pain  Skin: Negative for color change and rash  Neurological: Negative for seizures and syncope  Psychiatric/Behavioral: Negative for agitation and behavioral problems  All other systems reviewed and are negative          Objective:      /72 (BP Location: Left arm, Patient Position: Sitting, Cuff Size: Large)   Pulse 91   Temp 98 6 °F (37 °C) (Temporal)   Resp 18   Ht 5' 7" (1 702 m)   Wt 107 kg (235 lb 3 2 oz)   SpO2 94%   BMI 36 84 kg/m²          Physical Exam  Vitals reviewed  Constitutional:       Appearance: Normal appearance  She is obese  HENT:      Head: Normocephalic and atraumatic  Mouth/Throat:      Mouth: Mucous membranes are moist    Eyes:      Conjunctiva/sclera: Conjunctivae normal    Cardiovascular:      Rate and Rhythm: Normal rate and regular rhythm  Pulses: Normal pulses  Heart sounds: Normal heart sounds  Pulmonary:      Effort: Pulmonary effort is normal       Breath sounds: Normal breath sounds  Abdominal:      General: Abdomen is flat  Bowel sounds are normal       Palpations: Abdomen is soft  Musculoskeletal:         General: Normal range of motion  Cervical back: Normal range of motion  Right lower leg: No edema  Left lower leg: No edema  Skin:     General: Skin is warm and dry  Neurological:      Mental Status: She is alert and oriented to person, place, and time  Cranial Nerves: No cranial nerve deficit  Motor: No weakness     Psychiatric:         Mood and Affect: Mood normal          Behavior: Behavior normal

## 2021-10-12 ENCOUNTER — OFFICE VISIT (OUTPATIENT)
Dept: FAMILY MEDICINE CLINIC | Facility: CLINIC | Age: 26
End: 2021-10-12

## 2021-10-12 VITALS
RESPIRATION RATE: 18 BRPM | BODY MASS INDEX: 35.94 KG/M2 | HEART RATE: 93 BPM | WEIGHT: 229 LBS | DIASTOLIC BLOOD PRESSURE: 80 MMHG | OXYGEN SATURATION: 99 % | HEIGHT: 67 IN | TEMPERATURE: 98.6 F | SYSTOLIC BLOOD PRESSURE: 130 MMHG

## 2021-10-12 DIAGNOSIS — Z11.1 SCREENING-PULMONARY TB: ICD-10-CM

## 2021-10-12 DIAGNOSIS — Z00.00 ENCOUNTER FOR ANNUAL PHYSICAL EXAM: Primary | ICD-10-CM

## 2021-10-12 PROCEDURE — 99395 PREV VISIT EST AGE 18-39: CPT | Performed by: FAMILY MEDICINE

## 2021-10-15 ENCOUNTER — APPOINTMENT (OUTPATIENT)
Dept: LAB | Facility: CLINIC | Age: 26
End: 2021-10-15
Payer: COMMERCIAL

## 2021-10-15 DIAGNOSIS — Z11.1 SCREENING-PULMONARY TB: ICD-10-CM

## 2021-10-15 PROCEDURE — 86480 TB TEST CELL IMMUN MEASURE: CPT

## 2021-10-15 PROCEDURE — 36415 COLL VENOUS BLD VENIPUNCTURE: CPT

## 2021-10-19 LAB
GAMMA INTERFERON BACKGROUND BLD IA-ACNC: 0.02 IU/ML
M TB IFN-G BLD-IMP: NEGATIVE
M TB IFN-G CD4+ BCKGRND COR BLD-ACNC: 0 IU/ML
M TB IFN-G CD4+ BCKGRND COR BLD-ACNC: 0 IU/ML
MITOGEN IGNF BCKGRD COR BLD-ACNC: >10 IU/ML

## 2022-01-24 ENCOUNTER — TELEPHONE (OUTPATIENT)
Dept: NEUROSURGERY | Facility: CLINIC | Age: 27
End: 2022-01-24

## 2022-01-24 NOTE — TELEPHONE ENCOUNTER
1/24/22 PT MOTHER, EMILIANO, CALLED FOR APPT  SHE STATES PT HAD SEIZURE AND WAS SEEN AT [facility] ED AND RECOMMENDED TO F/U WITH NEUROLOGIST  PROVIDED NUMBER TO  NEUROLOGY  AND TRANSFERRED CALL

## 2022-01-25 DIAGNOSIS — N94.6 MENSTRUAL CRAMPS: ICD-10-CM

## 2022-01-25 RX ORDER — IBUPROFEN 600 MG/1
600 TABLET ORAL EVERY 6 HOURS PRN
Qty: 30 TABLET | Refills: 0 | Status: SHIPPED | OUTPATIENT
Start: 2022-01-25 | End: 2022-03-08

## 2022-01-31 RX ORDER — NORETHINDRONE ACETATE AND ETHINYL ESTRADIOL 1; 5 MG/1; UG/1
TABLET ORAL DAILY
COMMUNITY

## 2022-02-02 ENCOUNTER — OFFICE VISIT (OUTPATIENT)
Dept: CARDIOLOGY CLINIC | Facility: CLINIC | Age: 27
End: 2022-02-02
Payer: MEDICARE

## 2022-02-02 VITALS
OXYGEN SATURATION: 99 % | HEIGHT: 67 IN | BODY MASS INDEX: 35.31 KG/M2 | DIASTOLIC BLOOD PRESSURE: 65 MMHG | HEART RATE: 76 BPM | SYSTOLIC BLOOD PRESSURE: 90 MMHG | WEIGHT: 225 LBS

## 2022-02-02 DIAGNOSIS — R55 SYNCOPE, UNSPECIFIED SYNCOPE TYPE: Primary | ICD-10-CM

## 2022-02-02 PROCEDURE — 99204 OFFICE O/P NEW MOD 45 MIN: CPT | Performed by: INTERNAL MEDICINE

## 2022-02-02 PROCEDURE — 93000 ELECTROCARDIOGRAM COMPLETE: CPT | Performed by: INTERNAL MEDICINE

## 2022-02-02 NOTE — PROGRESS NOTES
[facility]    CHIEF COMPLAINT:   Chief Complaint   Patient presents with    New Patient Visit    Loss of Consciousness       HPI:  Elle Kelly is a 23 y o  female with a past medical history of vasovagal syncope who presents for follow-up after recent syncopal episode  She was recently seen at [facility] for an episode of unresponsiveness  Apparently, she was not feeling well the morning/afternoon of - she was feeling hot and has some blurry vision  She was eating normally prior to this  She denies any preceding illness, nausea, vomiting, diarrhea  While she was sitting down at the table and eating her soup these symptoms persisted and she loss consciousness  She did have preceding lightheadedness but denies any preceding chest pain, shortness of breath, palpitations  After she passed out her mother moved her to the sofa and witnessed some tonic-clonic activity  Her jaw was clenched  No loss of bowel or bladder control  This is followed by tonic-clonic activity and grunting  No loss of bowel or bladder control  Today, her mother reports that at least once per year if she bends over and then stands up too quickly she may have near syncope  Otherwise for at least the past couple of years she does not appear to have any obvious syncopal events      She otherwise denies any fever, chills, lightheadedness, chest pain, palpitations, shortness of breath lower extremity swelling    The following portions of the patient's history were reviewed and updated as appropriate: allergies, current medications, past family history, past medical history, past social history, past surgical history, and problem list     SINCE LAST OV I REVIEWED WITH THE PATIENT THE INTERIM LABS, TEST RESULTS, CONSULTANT(S) NOTES AND PERFORMED AN INTERIM REVIEW OF HISTORY    Past Medical History:   Diagnosis Date    Chest pain     Menstrual cramps 11/14/2018    Nephrolithiasis 7/12/2018    Palpitations     Pelvic pain 4/30/2015    Syncope        No past surgical history on file  Social History     Socioeconomic History    Marital status: Single     Spouse name: Not on file    Number of children: Not on file    Years of education: Not on file    Highest education level: Not on file   Occupational History    Occupation: college student   Tobacco Use    Smoking status: Never Smoker    Smokeless tobacco: Never Used   Vaping Use    Vaping Use: Never used   Substance and Sexual Activity    Alcohol use: No    Drug use: No    Sexual activity: Yes     Partners: Male   Other Topics Concern    Not on file   Social History Narrative    Always uses seat belt    College student     ancestry    Lives with parents, and 2 brothers  No smokers  Pt reports feeling safe at home  Primary spoken language English    Unemployed      Social Determinants of Health     Financial Resource Strain: Low Risk     Difficulty of Paying Living Expenses: Not hard at all   Food Insecurity: No Food Insecurity    Worried About Running Out of Food in the Last Year: Never true    Tirso of Food in the Last Year: Never true   Transportation Needs: No Transportation Needs    Lack of Transportation (Medical): No    Lack of Transportation (Non-Medical):  No   Physical Activity: Not on file   Stress: Not on file   Social Connections: Not on file   Intimate Partner Violence: Not on file   Housing Stability: Not on file       Family History   Problem Relation Age of Onset    No Known Problems Mother     Diabetes Father     Diabetes Maternal Grandfather     Diabetes Paternal Grandfather     Cancer Paternal Aunt        No Known Allergies    Current Outpatient Medications   Medication Sig Dispense Refill    benzoyl peroxide 5 % gel APPLY TO AFFECTED AREA TWICE A DAY 90 g 3    FLUoxetine (PROzac) 20 mg capsule Take 2 capsules (40 mg total) by mouth daily (Patient taking differently: Take 20 mg by mouth every morning ) 180 capsule 1    ibuprofen (MOTRIN) 600 mg tablet Take 1 tablet (600 mg total) by mouth every 6 (six) hours as needed for moderate pain 30 tablet 0    hydrOXYzine pamoate (VISTARIL) 25 mg capsule Take 1 capsule (25 mg total) by mouth 3 (three) times a day as needed for anxiety (Patient not taking: Reported on 2/2/2022 ) 90 capsule 1    Lidocaine Viscous HCl (XYLOCAINE) 2 % mucosal solution Swish and spit 10 mL 3 (three) times a day as needed for mouth pain or discomfort (Patient not taking: Reported on 10/12/2021) 100 mL 0    norethindrone-ethinyl estradiol (FEMHRT 1/5) 1-5 MG-MCG TABS Take by mouth daily (Patient not taking: Reported on 2/2/2022 )       No current facility-administered medications for this visit  BP 90/65 (BP Location: Left arm, Patient Position: Sitting, Cuff Size: Standard)   Pulse 76   Ht 5' 7" (1 702 m)   Wt 102 kg (225 lb)   SpO2 99%   BMI 35 24 kg/m²     Review of Systems   All other systems reviewed and are negative  Physical Exam  Vitals reviewed  Constitutional:       General: She is not in acute distress  Appearance: Normal appearance  She is not ill-appearing, toxic-appearing or diaphoretic  HENT:      Head: Normocephalic and atraumatic  Eyes:      General: No scleral icterus  Extraocular Movements: Extraocular movements intact  Cardiovascular:      Rate and Rhythm: Normal rate and regular rhythm  Pulses: Normal pulses  Heart sounds: Normal heart sounds  No murmur heard  No gallop  Pulmonary:      Effort: Pulmonary effort is normal  No respiratory distress  Breath sounds: Normal breath sounds  No wheezing or rales  Abdominal:      General: Abdomen is flat  Bowel sounds are normal  There is no distension  Palpations: Abdomen is soft  Tenderness: There is no abdominal tenderness  There is no guarding  Musculoskeletal:      Right lower leg: No edema  Left lower leg: No edema  Skin:     General: Skin is warm and dry  Coloration: Skin is not jaundiced or pale  Neurological:      Mental Status: She is alert  Psychiatric:         Mood and Affect: Mood normal          Behavior: Behavior normal           Lab Results   Component Value Date     06/30/2015    K 4 3 04/15/2021     04/15/2021    CO2 31 04/15/2021    BUN 7 04/15/2021    CREATININE 0 73 04/15/2021    GLUCOSE 81 06/30/2015    CALCIUM 9 1 04/15/2021    ALT 35 04/15/2021    AST 22 04/15/2021    INR 1 07 06/29/2015       Lab Results   Component Value Date    CHOL 193 08/19/2014    HDL 47 04/15/2021    LDLCALC 99 04/15/2021    TRIG 309 (H) 04/15/2021       Lab Results   Component Value Date    WBC 10 87 (H) 04/15/2021    HGB 13 1 04/15/2021    HCT 42 5 04/15/2021     04/15/2021       Lab Results   Component Value Date     08/19/2014    HGBA1C 5 4 08/19/2014       No results found for: TSH    Cardiac studies:   Results for orders placed or performed in visit on 02/02/22   POCT ECG    Impression    Normal sinus rhythm  Early repolarization     Cardiac MRI - 6/2018: Impression:  1  Normal left and right ventricular size and systolic function  2  No significant valvular abnormalities  3  No evidence of myocardial scarring, inflammation, or infiltrative disease  4  No MRI criteria for right ventricular dysplasia  24 hour Holter - 5/24/2018: IMPRESSION:  1  Predominantly sinus rhythm  2  Normal 24 hour Holter    TTE - 5/24/2018:  LEFT VENTRICLE: Size was normal  Systolic function was normal  Ejection fraction was estimated to be 55 %  There were no regional wall motion abnormalities  Wall thickness was normal  There was no evidence of concentric hypertrophy  DOPPLER: Left ventricular diastolic function parameters were normal for the patient's age  RIGHT VENTRICLE: The ventricle was mildly dilated   Systolic function was low normal   LEFT ATRIUM: Size was normal   RIGHT ATRIUM: Size was at the upper limits of normal   MITRAL VALVE: Valve structure was normal  There was normal leaflet separation  DOPPLER: The transmitral velocity was within the normal range  There was no evidence for stenosis  There was trace regurgitation  AORTIC VALVE: The valve was trileaflet  Leaflets exhibited normal thickness and normal cuspal separation  DOPPLER: Transaortic velocity was within the normal range  There was no evidence for stenosis  There was no regurgitation  TRICUSPID VALVE: The valve structure was normal  There was normal leaflet separation  DOPPLER: The transtricuspid velocity was within the normal range  There was no evidence for stenosis  There was moderate regurgitation  Pulmonary artery systolic pressure was within the normal range  PULMONIC VALVE: Leaflets exhibited normal thickness, no calcification, and normal cuspal separation  DOPPLER: The transpulmonic velocity was within the normal range  There was trace regurgitation  PERICARDIUM: There was no pericardial effusion  The pericardium was normal in appearance      AORTA: The root exhibited normal size  ASSESSMENT AND PLAN:  Jule Baumgarten was seen today for new patient visit and loss of consciousness  Diagnoses and all orders for this visit:    Syncope, unspecified syncope type  -     POCT ECG      71-year-old female with a history of vasovagal syncope and low blood pressure  Prior episodes thought to be in the setting of low blood pressure/dehydration  Also reported episode of near syncope in July 2018 which she was in significant pain in the setting of a kidney stone  She had an extensive workup in 2018  TTE revealed normal LVEF, mildly dilated RV with moderate TR  24 hour Holter monitor was unremarkable  Cardiac MRI was performed and revealed normal RV size and systolic function  Since that time she has had no obvious syncopal episodes  Her mother does describe a typical orthostatic lightheadedness and near-syncope when she changes positions too quickly      She was counseled on increasing her fluid and salt intake  We also discussed physical counter maneuvers should she feel preceding symptoms of lightheadedness  Given that symptoms are very infrequent and classic for orthostasis and vasovagal syncope, will defer a cardiac event monitor or implantable loop recorder at this time      Kalyani Prater MD

## 2022-02-02 NOTE — PATIENT INSTRUCTIONS
You were seen today in the Cardiology office for a recent episode of loss of consciousness  Please increase your water intake and be liberal with your salt intake  These both should help your blood pressure  If you are feeling faint or dizzy, you can try counter counter maneuvers  Thank you for choosing [address]  Please call our office or use [employer] with any questions  Manage your symptoms:   · Change positions slowly:  When you get out of bed, sit up first, then slowly move your legs to the side of the bed  If you are not having any symptoms, slowly stand up  If you have symptoms, sit down right away  · Exercise and do physical counter maneuvers:  Ask your healthcare provider or specialist about the best exercise plan for you  Physical counter maneuvers may help to increase your BP and increase blood flow to your heart  They include crossing your legs, squatting, and bending at the waist  You can also rise up on your toes while you are standing, and tighten your thigh muscles  · Drink liquids as directed:  Ask your healthcare provider or specialist how much liquid to drink each day and which liquids are best for you  Drink 500 milliliters (½ liter) of liquid quickly in the morning or before meals to help increase your BP  Your healthcare provider may tell you to drink 2 cups of coffee with, or after, breakfast and lunch  The caffeine in the coffee can help prevent a drop in your BP  Your healthcare provider may also give you caffeine pills  · Change how you eat meals: If your BP drops after eating large meals, try to eat smaller meals more often  Eat foods low in carbohydrates and cholesterol to help prevent BP drops after you eat  Ask if you need to increase the amount of sodium (salt) you eat each day  · Raise the head of your bed:  Raise the head of your bed 4 to 8 inches   This may help prevent morning BP drops and decrease the need to urinate during the night  Avoid things that make your hypotension worse:   · Do not drink alcohol:  Alcohol can make your symptoms worse  Ask for information if you need help quitting  · Avoid straining:  Activities and movements that cause you to strain can cause a drop in your BP  Activities to avoid include lifting, coughing, and other movements that increase the feeling of pressure in your chest     · Avoid the heat: This can cause a decrease in your BP  Stay inside during very hot days, or limit the amount of time you are outside  Do not take hot baths  Contact your healthcare provider or specialist if:   · You vomit several times or have diarrhea, and you cannot drink liquid  · You have a fever  · You have new or increased symptoms, such as dizziness, weakness, or fainting  · Your legs, ankles, and feet are swollen, or you gain weight for no known reason  · You have questions or concerns about your condition or care  Return to the emergency department if:   · You become confused or cannot speak  · You urinate very little or not at all  · You have a seizure  · You have chest pain or trouble breathing  · You have changes in vision or cannot see

## 2022-02-11 ENCOUNTER — OFFICE VISIT (OUTPATIENT)
Dept: NEUROLOGY | Facility: CLINIC | Age: 27
End: 2022-02-11
Payer: MEDICARE

## 2022-02-11 VITALS
DIASTOLIC BLOOD PRESSURE: 70 MMHG | BODY MASS INDEX: 36.77 KG/M2 | WEIGHT: 234.8 LBS | HEART RATE: 81 BPM | SYSTOLIC BLOOD PRESSURE: 110 MMHG | TEMPERATURE: 97 F

## 2022-02-11 DIAGNOSIS — R56.9 SEIZURE (HCC): Primary | ICD-10-CM

## 2022-02-11 PROCEDURE — 99205 OFFICE O/P NEW HI 60 MIN: CPT | Performed by: STUDENT IN AN ORGANIZED HEALTH CARE EDUCATION/TRAINING PROGRAM

## 2022-02-11 RX ORDER — DIAZEPAM 10 MG/100UL
10 SPRAY NASAL AS NEEDED
Qty: 1 EACH | Refills: 0 | Status: SHIPPED | OUTPATIENT
Start: 2022-02-11

## 2022-02-11 RX ORDER — LEVETIRACETAM 500 MG/1
500 TABLET ORAL EVERY 12 HOURS SCHEDULED
Qty: 120 TABLET | Refills: 3 | Status: SHIPPED | OUTPATIENT
Start: 2022-02-11

## 2022-02-11 NOTE — ASSESSMENT & PLAN NOTE
22 yo female with 5-6 lifetime events approximately annually of seizure like activity  First event was at 24  Most recent was January 22  Each episode is the same and all have been witnessed by mom  No known triggers  Semiology: pt reports feeling hot and vertiginous and then sits down  Eyes roll straight up, eyes then close and remain closed  Full body shaking starts lasting 10-15 minutes resolved almost instantly with benzodiazepine  Regains consciousness within 1-2 minutes but feels "drained" for hours to a day after  Previous workup has included normal CTH and routine EEG in 2019  Labs have been unremarkable during ED visits  Also extensive cardiology workup for possibility of syncopal etiology which was also negative  No previous MRI or prolonged monitoring  At present, clinical presentation is most consistent with nonepileptic events but at this time cannot rule out epilepsy  Plan:  · MRI brain seizure protocol ordered  · Medication options discussed and reviewed with mom and patient, will start Keppra 500 mg BID  · Routine EEG ordered and will proceed to 72 hour ambulatory EEG after  · Rescue medication prescribed, nasal diazepam (valtoco)  · Return in about 6 months (around 8/11/2022)

## 2022-02-11 NOTE — PROGRESS NOTES
Patient ID: Freda Fierro is a 23 y o  female  Assessment/Plan:    Seizure-like activity ([facility] [address] )  24 yo female with 5-6 lifetime events approximately annually of seizure like activity  First event was at 24  Most recent was January 22  Each episode is the same and all have been witnessed by mom  No known triggers  Semiology: pt reports feeling hot and vertiginous and then sits down  Eyes roll straight up, eyes then close and remain closed  Full body shaking starts lasting 10-15 minutes resolved almost instantly with benzodiazepine  Regains consciousness within 1-2 minutes but feels "drained" for hours to a day after  Previous workup has included normal CTH and routine EEG in 2019  Labs have been unremarkable during ED visits  Also extensive cardiology workup for possibility of syncopal etiology which was also negative  No previous MRI or prolonged monitoring  At present, clinical presentation is most consistent with nonepileptic events but at this time cannot rule out epilepsy  Plan:  · MRI brain seizure protocol ordered  · Medication options discussed and reviewed with mom and patient, will start Keppra 500 mg BID  · Routine EEG ordered and will proceed to 72 hour ambulatory EEG after  · Rescue medication prescribed, nasal diazepam (valtoco)  · Return in about 6 months (around 8/11/2022)  Diagnoses and all orders for this visit:    Seizure ([facility] [address] )  -     EEG awake or drowsy routine; Future  -     Ambulatory EEG 72 Hours; Standing  -     levETIRAcetam (Keppra) 500 mg tablet; Take 1 tablet (500 mg total) by mouth every 12 (twelve) hours  -     MRI brain without contrast; Future  -     diazePAM, 20 MG Dose, (Valtoco 20 MG Dose) 2 x 10 MG/0 1ML LQPK; 10 mg into each nostril as needed (for seizure longer than 5 minutes)           Subjective:     Freda Fierro is a 23 y o  female who presents for evaluation of seizures, she has never been evaluated by neurology    Pertinent past medical history includes: nothing    Claritza Wiggins presents to today's appointment with her mother who has witnessed all of her episodes  She reports her first lifetime episode was when she was 24  She was with her mother in the park and she suddenly pitched forward onto concrete causing a scalp laceration and was noted to be shaking for about 10 minutes (full body) with eyes closed and deviated upward  She was not aware of anyone speaking to her during this episode and her first memory is waking up in the ED about 1-2 mins after she was administered a benzodiazepine and then she was somewhat off of her normal self for the next day  Since that first episode she has had about 5-6 episodes approximately 1x per year and each episode has been the same  Claritza Wiggins is aware an episode is coming on at this point and will tell her mother and then sit down and then her eyes roll back, she closes her eyes and experiences full body shaking for 10-15 minutes  On each occasion EMS was called and administered a benzodiazepine which terminated the event and she regained consciousness shortly after  She has never let an episode run its course  She has previously undergone CTH and was normal  Labs at each hospitalization have been normal  She also underwent routine EEG in 2019 which was normal  She has never been started on AED  She has also been extensively worked up by cardiology for the possibility that these were syncopal events and workup including cardiac MRI was unremarkable  Her most recent episode was on January 22  She was watching a movie by herself and reported that she could feel an event coming on and sat down and the event progressed same as previous  At that time she presented to the ED at [facility] and was administered benzodiazepines and the episode resolved  She was told not to drive and reportedly [employer] form was filed on her behalf        The following portions of the patient's history were reviewed and updated as appropriate: allergies, current medications, past family history, past medical history, past social history, past surgical history and problem list          Objective:    Blood pressure 110/70, pulse 81, temperature (!) 97 °F (36 1 °C), weight 107 kg (234 lb 12 8 oz)  Physical Exam  Vitals reviewed  Constitutional:       General: She is not in acute distress  HENT:      Head: Normocephalic and atraumatic  Eyes:      General: Lids are normal       Extraocular Movements: Extraocular movements intact  Pupils: Pupils are equal, round, and reactive to light  Pulmonary:      Effort: Pulmonary effort is normal  No respiratory distress  Neurological:      Mental Status: She is alert  Coordination: Coordination is intact  Deep Tendon Reflexes: Strength normal and reflexes are normal and symmetric  Psychiatric:         Mood and Affect: Mood normal          Speech: Speech normal          Behavior: Behavior normal          Neurological Exam  Mental Status  Alert  Recent and remote memory are intact  Speech is normal  Language is fluent with no aphasia  Attention and concentration are normal  Fund of knowledge is appropriate for level of education  Cranial Nerves  CN II: Visual acuity is normal  Visual fields full to confrontation  CN III, IV, VI: Extraocular movements intact bilaterally  Normal lids and orbits bilaterally  Pupils equal round and reactive to light bilaterally  CN V: Facial sensation is normal   CN VII: Full and symmetric facial movement  CN VIII: Hearing is normal   CN IX, X: Palate elevates symmetrically  Normal gag reflex  CN XI: Shoulder shrug strength is normal   CN XII: Tongue midline without atrophy or fasciculations  Motor  Normal muscle bulk throughout  No fasciculations present  Normal muscle tone  No abnormal involuntary movements  Strength is 5/5 throughout all four extremities      Sensory  Sensation is intact to light touch, pinprick, vibration and proprioception in all four extremities  Reflexes  Deep tendon reflexes are 2+ and symmetric in all four extremities with downgoing toes bilaterally  Coordination  Finger-to-nose, rapid alternating movements and heel-to-shin normal bilaterally without dysmetria  Gait  Normal casual, toe, heel and tandem gait  ROS:    Review of Systems   Constitutional: Negative  Negative for appetite change and fever  HENT: Negative  Negative for hearing loss, tinnitus, trouble swallowing and voice change  Eyes: Negative  Negative for photophobia and pain  Respiratory: Negative  Negative for shortness of breath  Cardiovascular: Negative  Negative for palpitations  Gastrointestinal: Negative  Negative for nausea and vomiting  Endocrine: Negative  Negative for cold intolerance  Genitourinary: Negative  Negative for dysuria, frequency and urgency  Musculoskeletal: Negative  Negative for myalgias and neck pain  Skin: Negative  Negative for rash  Neurological: Positive for seizures (1/23/22 2hrs/witnessed, hosp ) and light-headedness  Negative for dizziness, tremors, syncope, facial asymmetry, speech difficulty, weakness, numbness and headaches  Hematological: Negative  Does not bruise/bleed easily  Psychiatric/Behavioral: Negative  Negative for confusion, hallucinations and sleep disturbance  All other systems reviewed and are negative  Review of systems as documented by the MA was reviewed in full by myself, Malini Miranda MD      More than 50% of this time spent with the patient was devoted to counseling and coordination of care  Issues addressed during this clinic visit included overall management, medication counseling or monitoring (including adverse effects, side effects and risks of medications)

## 2022-03-08 DIAGNOSIS — N94.6 MENSTRUAL CRAMPS: ICD-10-CM

## 2022-03-08 RX ORDER — IBUPROFEN 600 MG/1
TABLET ORAL
Qty: 30 TABLET | Refills: 0 | Status: SHIPPED | OUTPATIENT
Start: 2022-03-08

## 2022-03-08 NOTE — TELEPHONE ENCOUNTER
Please review and refill as appropriate  Last time medication was prescribed was on Jan 25,2022Thanks

## 2022-04-22 DIAGNOSIS — B00.2 HERPES VIRUS INFECTION OF ORAL MUCOSA: ICD-10-CM

## 2022-04-22 DIAGNOSIS — L70.0 ACNE VULGARIS: ICD-10-CM

## 2022-04-22 RX ORDER — LIDOCAINE HYDROCHLORIDE 20 MG/ML
10 SOLUTION OROPHARYNGEAL 3 TIMES DAILY PRN
Qty: 100 ML | Refills: 0 | Status: SHIPPED | OUTPATIENT
Start: 2022-04-22

## 2022-07-18 DIAGNOSIS — L70.0 ACNE VULGARIS: ICD-10-CM

## 2022-07-18 RX ORDER — METHOCARBAMOL 750 MG/1
TABLET ORAL
Qty: 85 G | Refills: 2 | Status: SHIPPED | OUTPATIENT
Start: 2022-07-18 | End: 2022-08-10 | Stop reason: SDUPTHER

## 2022-08-10 ENCOUNTER — OFFICE VISIT (OUTPATIENT)
Dept: FAMILY MEDICINE CLINIC | Facility: CLINIC | Age: 27
End: 2022-08-10

## 2022-08-10 VITALS
TEMPERATURE: 97.3 F | HEIGHT: 67 IN | DIASTOLIC BLOOD PRESSURE: 60 MMHG | BODY MASS INDEX: 35.31 KG/M2 | SYSTOLIC BLOOD PRESSURE: 100 MMHG | RESPIRATION RATE: 18 BRPM | WEIGHT: 225 LBS | HEART RATE: 72 BPM

## 2022-08-10 DIAGNOSIS — L70.0 ACNE VULGARIS: Primary | ICD-10-CM

## 2022-08-10 DIAGNOSIS — L85.8 KERATOSIS PILARIS: ICD-10-CM

## 2022-08-10 DIAGNOSIS — L70.0 ACNE VULGARIS: ICD-10-CM

## 2022-08-10 PROCEDURE — 99213 OFFICE O/P EST LOW 20 MIN: CPT | Performed by: FAMILY MEDICINE

## 2022-08-10 RX ORDER — ADAPALENE 0.1 %
CREAM (GRAM) TOPICAL
Qty: 45 G | Refills: 0 | Status: SHIPPED | OUTPATIENT
Start: 2022-08-10 | End: 2022-08-10

## 2022-08-10 RX ORDER — ADAPALENE 0.1 G/100G
CREAM TOPICAL
Qty: 45 G | Refills: 0 | Status: SHIPPED | OUTPATIENT
Start: 2022-08-10 | End: 2022-08-10

## 2022-08-10 RX ORDER — ADAPALENE 0.1 G/100G
CREAM TOPICAL
Qty: 45 G | Refills: 0 | Status: SHIPPED | OUTPATIENT
Start: 2022-08-10

## 2022-08-10 NOTE — TELEPHONE ENCOUNTER
Please advise if alternate cream can be ordered or if prior authorization should still be submitted  Thanks!

## 2022-08-10 NOTE — PROGRESS NOTES
CLINIC VISIT NOTE - [address] [facility] Gordon 23 y o  female MRN: [id number]  Encounter: [id number],  Primary Care Provider: Deb Gomez MD      Date: 08/10/22    Assessments & Plans:     Problem List Items Addressed This Visit        Musculoskeletal and Integument    Acne vulgaris - Primary     Pt c/o chronic noninflammed acne vulgaris on lower face  - Previously on topical Benzoyl-peroxide but has not been taking since 2020  - Exam + noninflammed acne on lower face and back (see picture)  - Will trial short course of topical Adapalene and Benzoyl-peroxide   - Referral to dermatology per patient's request  - RTC after 1 month to reassess         Relevant Medications    benzoyl peroxide (Acne Medication 5) 5 % gel    Other Relevant Orders    Ambulatory Referral to Dermatology    Keratosis pilaris    Relevant Medications    benzoyl peroxide (Acne Medication 5) 5 % gel    Other Relevant Orders    Ambulatory Referral to Dermatology                          Follow-up: Return in about 1 month (around 9/10/2022) for acne      Health Maintenance Due   Topic Date Due    COVID-19 Vaccine (1) Never done    PT PLAN OF CARE  06/10/2018    Cervical Cancer Screening  07/24/2022    BMI: Followup Plan  08/13/2022    Influenza Vaccine (1) 09/01/2022    Annual Physical  10/12/2022         Patient Active Problem List   Diagnosis    Acne vulgaris    Back pain    Keratosis pilaris    Obesity    Chronic pain of right ankle    Vasovagal syncope    Depression with anxiety    Dysmenorrhea    HSV-2 infection    Hypertriglyceridemia without hypercholesterolemia    Encounter for screening examination for chlamydial infection    Syncope    Vaginal candidiasis    Desire for pregnancy    Vaginal discharge    Dysuria    Bacterial vaginitis    Menstrual abnormality    Increased urinary frequency    Screening-pulmonary TB    Seizure-like activity ([address] )         Recent Visits:     Recent Visits  No visits were found meeting these conditions  Showing recent visits within past 7 days and meeting all other requirements  Today's Visits  Date Type Provider Dept   08/10/22 Office Visit MD Marisol Alcantar today's visits and meeting all other requirements  Future Appointments  No visits were found meeting these conditions  Showing future appointments within next 150 days and meeting all other requirements      Subjective     Subjective:     Chief Complaint   Patient presents with    Referral for Dermatology        HPI:  23 y o  female presents for concern of c/o chronic noninflammed acne vulgaris on lower face  Previously on topical Benzoyl-peroxide but has not been taking since 2020      HPI      Review of System:     Review of Systems   Constitutional: Negative for chills and fever  HENT: Negative for ear pain, sinus pain, sore throat and trouble swallowing  Eyes: Negative for pain and discharge  Respiratory: Negative for shortness of breath  Cardiovascular: Negative for chest pain, palpitations and leg swelling  Gastrointestinal: Negative for abdominal pain, nausea and vomiting  Endocrine: Negative for polydipsia and polyuria  Genitourinary: Negative for dysuria and hematuria  Musculoskeletal: Negative for arthralgias and myalgias  Skin: Positive for rash  Negative for wound  Neurological: Negative for seizures and syncope  Psychiatric/Behavioral: Negative for behavioral problems  Historical Information     History:     Past Medical History:   Diagnosis Date    Chest pain     Menstrual cramps 11/14/2018    Nephrolithiasis 7/12/2018    Palpitations     Pelvic pain 4/30/2015    Syncope      History reviewed  No pertinent surgical history    Social History   Social History     Substance and Sexual Activity   Alcohol Use No     Social History     Substance and Sexual Activity   Drug Use No     Social History     Tobacco Use   Smoking Status Never Smoker   Smokeless Tobacco Never Used       Meds/Allergies     Medications & Allergies:   No Known Allergies    PTA Medications:  Current Outpatient Medications on File Prior to Visit   Medication Sig Dispense Refill    diazePAM, 20 MG Dose, (Valtoco 20 MG Dose) 2 x 10 MG/0 1ML LQPK 10 mg into each nostril as needed (for seizure longer than 5 minutes) 1 each 0    ibuprofen (MOTRIN) 600 mg tablet TAKE 1 TABLET BY MOUTH EVERY 6 HOURS AS NEEDED FOR MODERATE PAIN  30 tablet 0    levETIRAcetam (Keppra) 500 mg tablet Take 1 tablet (500 mg total) by mouth every 12 (twelve) hours 120 tablet 3    Lidocaine Viscous HCl (XYLOCAINE) 2 % mucosal solution Swish and spit 10 mL 3 (three) times a day as needed for mouth pain or discomfort 100 mL 0    norethindrone-ethinyl estradiol (FEMHRT 1/5) 1-5 MG-MCG TABS Take by mouth daily      [DISCONTINUED] Acne Medication 5 5 % gel APPLY 1 APPLICATION TOPICALLY 2 (TWO) TIMES A DAY TO AFFECTED AREA 85 g 2    FLUoxetine (PROzac) 20 mg capsule Take 2 capsules (40 mg total) by mouth daily (Patient not taking: No sig reported) 180 capsule 1    hydrOXYzine pamoate (VISTARIL) 25 mg capsule Take 1 capsule (25 mg total) by mouth 3 (three) times a day as needed for anxiety (Patient not taking: No sig reported) 90 capsule 1     No current facility-administered medications on file prior to visit  Objective     Objective & Vitals:   Vitals: /60 (BP Location: Left arm, Patient Position: Sitting, Cuff Size: Large)   Pulse 72   Temp (!) 97 3 °F (36 3 °C) (Temporal)   Resp 18   Ht 5' 7" (1 702 m)   Wt 102 kg (225 lb)   BMI 35 24 kg/m²       Labs, Imagings, and other studies:   I have personally reviewed pertinent reports  No results found for this or any previous visit (from the past 24 hour(s))  No results found  Physical Exam   Physical Exam  Vitals and nursing note reviewed  Constitutional:       General: She is not in acute distress       Appearance: Normal appearance  She is well-developed and normal weight  She is not ill-appearing, toxic-appearing or diaphoretic  HENT:      Head: Normocephalic and atraumatic  Right Ear: External ear normal       Left Ear: External ear normal       Nose: Nose normal       Mouth/Throat:      Mouth: Mucous membranes are moist    Eyes:      General:         Right eye: No discharge  Left eye: No discharge  Extraocular Movements: Extraocular movements intact  Cardiovascular:      Rate and Rhythm: Normal rate  Pulses: Normal pulses  Pulmonary:      Effort: Pulmonary effort is normal  No respiratory distress  Abdominal:      General: There is no distension  Musculoskeletal:         General: Normal range of motion  Cervical back: Normal range of motion and neck supple  Skin:     General: Skin is warm  Comments: noninflammed acne on lower face and back (see picture)   Neurological:      General: No focal deficit present  Mental Status: She is alert  Mental status is at baseline     Psychiatric:         Mood and Affect: Mood normal          Behavior: Behavior normal                 Health Maintenance:     Health Maintenance Due   Topic Date Due    COVID-19 Vaccine (1) Never done    PT PLAN OF CARE  06/10/2018    Cervical Cancer Screening  07/24/2022    BMI: Followup Plan  08/13/2022    Influenza Vaccine (1) 09/01/2022    Annual Physical  10/12/2022         Future Labs & Appointments:     FUTURE LABS:  Lab Frequency Next Occurrence   Echo complete with contrast if indicated Once 12/10/2019   VAS carotid complete study Once 12/10/2019   Holter monitor - 48 hour Once 12/10/2019   MRI brain without contrast Once 02/11/2022   Ambulatory EEG 72 Hours Every Weekday        FUTURE CONFIRMED APPOINTMENTS:  Future Appointments   Date Time Provider Werner Higgins   9/15/2022  1:30 PM MD Annalise E[address]       Norma Carias MD  PGY-2, B Family Medicine  08/10/22, 2:51 PM

## 2022-08-10 NOTE — TELEPHONE ENCOUNTER
Pharmacist called to inform Dr Shruti Pisano that medication Differin 0 1% cream needs a prior authorization unless a different medication is ordered

## 2022-08-10 NOTE — ASSESSMENT & PLAN NOTE
Pt c/o chronic noninflammed acne vulgaris on lower face  - Previously on topical Benzoyl-peroxide but has not been taking since 2020  - Exam + noninflammed acne on lower face and back (see picture)  - Will trial short course of topical Adapalene and Benzoyl-peroxide   - Referral to dermatology per patient's request  - RTC after 1 month to reassess

## 2022-10-12 PROBLEM — Z11.1 SCREENING-PULMONARY TB: Status: RESOLVED | Noted: 2021-10-12 | Resolved: 2022-10-12

## 2023-02-22 ENCOUNTER — TELEPHONE (OUTPATIENT)
Dept: FAMILY MEDICINE CLINIC | Facility: CLINIC | Age: 28
End: 2023-02-22

## 2023-02-22 PROBLEM — Z01.419 WELL WOMAN EXAM: Status: ACTIVE | Noted: 2023-02-22

## 2023-02-22 NOTE — LETTER
[address] [place] [address]  [address] [facility] [address]  Phone#  [phone]  Fax#  [phone]    February 22, 2023    Reinier Ellis  1995  Shira Alfonso [address] [place] [address]    Dear Shelbi Abdalla had 1st  no-show appointments at [address] [place] [address]  After each no-show, the office will send a letter warning you that excessive no-shows may result in your dismissal as a patient from the [address] [place] [address]    An appointment is considered a "no-show" when a patient does not arrive for their scheduled appointment and has not called the practice at least two (2) hours before their scheduled appointment to either reschedule or cancel the appointment  A "no-show" can also occur when an appointment is canceled due to the patient arriving fifteen (15) minutes or more past the scheduled time of their appointment  In accordance with the [facility]'s "No-Show and Patient Dismissal Policy", a patient may be dismissed from any [facility], at the discretion of the Practice Administrator and [address] [place] [address], after a patient accumulates three (3) consecutive no-show appointments  In the future, we request that you call the office at least two (2) hours before your scheduled appointment to reschedule or cancel the appointment and avoid another no-show  If you need assistance with keeping scheduled appointments for any reason, speak to a member of your care team  [facility] has dedicated personnel that can connect you with resources       In order to assure quality and comprehensive care in a timely manner for all patients, to meet the needs of our patient population, and assist in any possible appropriate scheduling and receipt of care, the [facility] practices encourage all patients to arrive fifteen (15) minutes prior to their scheduled appointment      Respectfully,   Practice Administrator

## 2023-05-24 ENCOUNTER — OFFICE VISIT (OUTPATIENT)
Dept: FAMILY MEDICINE CLINIC | Facility: CLINIC | Age: 28
End: 2023-05-24

## 2023-05-24 VITALS
BODY MASS INDEX: 35.03 KG/M2 | RESPIRATION RATE: 16 BRPM | WEIGHT: 223.2 LBS | HEIGHT: 67 IN | HEART RATE: 112 BPM | SYSTOLIC BLOOD PRESSURE: 100 MMHG | DIASTOLIC BLOOD PRESSURE: 64 MMHG | TEMPERATURE: 98.4 F

## 2023-05-24 DIAGNOSIS — N76.0 BACTERIAL VAGINOSIS: ICD-10-CM

## 2023-05-24 DIAGNOSIS — B96.89 BACTERIAL VAGINOSIS: ICD-10-CM

## 2023-05-24 DIAGNOSIS — N94.6 MENSTRUAL CRAMPS: ICD-10-CM

## 2023-05-24 DIAGNOSIS — N89.8 VAGINAL DISCHARGE: Primary | ICD-10-CM

## 2023-05-24 RX ORDER — METRONIDAZOLE 500 MG/1
500 TABLET ORAL EVERY 12 HOURS SCHEDULED
Qty: 14 TABLET | Refills: 0 | Status: SHIPPED | OUTPATIENT
Start: 2023-05-24 | End: 2023-05-31

## 2023-05-24 RX ORDER — IBUPROFEN 600 MG/1
600 TABLET ORAL EVERY 6 HOURS PRN
Qty: 30 TABLET | Refills: 0 | Status: SHIPPED | OUTPATIENT
Start: 2023-05-24

## 2023-05-24 NOTE — PROGRESS NOTES
Name: Ana Diallo      : 1995      MRN: [id number]  Encounter Provider: Eliel Kingston MD  Encounter Date: 2023   Encounter department: [address] [place] [address]  Vaginal discharge  Assessment & Plan:  - Brenden Ours, malodorous vaginal discharge x1 week   - Speculum exam: no signs of brown vaginal discharge, clear vaginal discharge noted  No cervical motion tenderness or friable cervix    - KOH slide: no budding yeast or pseudohyphae   - Wet mount slides: +clue cells, negative trichomonads   -symptoms 2/2 BV    Plan:  - Will rx metronidazole 500 mg q12h for 7 days  Advised patient to complete full course of abx even if symptoms improve  - Advised patient to avoid douching, avoid tight fitting pants, and to wear cotton underwear  Advised patient to use non scented soap and warm water for vaginal hygiene  2  Bacterial vaginosis  -     metroNIDAZOLE (FLAGYL) 500 mg tablet; Take 1 tablet (500 mg total) by mouth every 12 (twelve) hours for 7 days         Subjective      Patient states that she began noticing brown malodorous vaginal discharge one week ago  She states that it is enough discharge that she needs to wear a thin panty liner and change it multiple times throughout the day  She states her LMP was 23  She states she has not been sexually active in over 3 years and states that she has not placed anything in her vagina recently including tampons or foreign bodies  Patient states that she has never had symptoms like this previously  She denies dysuria, urinary urgency or frequqncy, itching or burning of her vulva, or pain of vagina  She denies douching     Review of Systems   Constitutional: Negative for chills and fever  HENT: Negative for congestion and sore throat  Eyes: Negative for visual disturbance  Respiratory: Negative for cough and shortness of breath  Cardiovascular: Negative for chest pain and palpitations  "  Gastrointestinal: Negative for abdominal pain, constipation and nausea  Genitourinary: Positive for vaginal discharge  Negative for decreased urine volume, difficulty urinating, dysuria, genital sores, menstrual problem, pelvic pain, vaginal bleeding and vaginal pain  Skin: Negative for rash  Allergic/Immunologic: Negative for environmental allergies and food allergies  Neurological: Negative for dizziness and light-headedness  Current Outpatient Medications on File Prior to Visit   Medication Sig   • adapalene (DIFFERIN) 0 1 % cream APPLY TOPICALLY DAILY AT BEDTIME   • benzoyl peroxide (Acne Medication 5) 5 % gel Apply topically 2 (two) times a day to the affected area   • ibuprofen (MOTRIN) 600 mg tablet TAKE 1 TABLET BY MOUTH EVERY 6 HOURS AS NEEDED FOR MODERATE PAIN     • diazePAM, 20 MG Dose, (Valtoco 20 MG Dose) 2 x 10 MG/0 1ML LQPK 10 mg into each nostril as needed (for seizure longer than 5 minutes) (Patient not taking: Reported on 5/24/2023)   • FLUoxetine (PROzac) 20 mg capsule Take 2 capsules (40 mg total) by mouth daily (Patient not taking: Reported on 2/11/2022)   • hydrOXYzine pamoate (VISTARIL) 25 mg capsule Take 1 capsule (25 mg total) by mouth 3 (three) times a day as needed for anxiety (Patient not taking: Reported on 2/2/2022)   • levETIRAcetam (Keppra) 500 mg tablet Take 1 tablet (500 mg total) by mouth every 12 (twelve) hours (Patient not taking: Reported on 5/24/2023)   • Lidocaine Viscous HCl (XYLOCAINE) 2 % mucosal solution Swish and spit 10 mL 3 (three) times a day as needed for mouth pain or discomfort (Patient not taking: Reported on 5/24/2023)   • norethindrone-ethinyl estradiol (FEMHRT 1/5) 1-5 MG-MCG TABS Take by mouth daily (Patient not taking: Reported on 5/24/2023)       Objective     /64   Pulse (!) 112   Temp 98 4 °F (36 9 °C)   Resp 16   Ht 5' 7\" (1 702 m)   Wt 101 kg (223 lb 3 2 oz)   BMI 34 96 kg/m²     Physical Exam  Exam conducted with a chaperone " present  Constitutional:       General: She is not in acute distress  Appearance: Normal appearance  HENT:      Head: Normocephalic and atraumatic  Cardiovascular:      Rate and Rhythm: Normal rate and regular rhythm  Pulses: Normal pulses  Heart sounds: No murmur heard  Pulmonary:      Effort: Pulmonary effort is normal  No respiratory distress  Breath sounds: Normal breath sounds  Abdominal:      General: Bowel sounds are normal       Palpations: Abdomen is soft  Tenderness: There is no abdominal tenderness  Genitourinary:     General: Normal vulva  Exam position: Lithotomy position  Pubic Area: No rash  Labia:         Right: No rash or lesion  Left: No rash or lesion  Vagina: Vaginal discharge (clear) present  No erythema, tenderness, bleeding or lesions  Cervix: Discharge (clear) present  No friability, lesion or erythema  Musculoskeletal:         General: Normal range of motion  Cervical back: Normal range of motion  Right lower leg: No edema  Left lower leg: No edema  Skin:     General: Skin is warm  Neurological:      General: No focal deficit present  Mental Status: She is alert and oriented to person, place, and time         Korey Euceda MD

## 2023-05-24 NOTE — ASSESSMENT & PLAN NOTE
Oj Loosen, malodorous vaginal discharge x1 week   - Speculum exam: no signs of brown vaginal discharge, clear vaginal discharge noted  No cervical motion tenderness or friable cervix    - KOH slide: no budding yeast or pseudohyphae   - Wet mount slides: +clue cells, negative trichomonads   -symptoms 2/2 BV    Plan:  - Will rx metronidazole 500 mg q12h for 7 days  Advised patient to complete full course of abx even if symptoms improve  - Advised patient to avoid douching, avoid tight fitting pants, and to wear cotton underwear  Advised patient to use non scented soap and warm water for vaginal hygiene

## 2023-06-07 ENCOUNTER — OFFICE VISIT (OUTPATIENT)
Dept: FAMILY MEDICINE CLINIC | Facility: CLINIC | Age: 28
End: 2023-06-07

## 2023-06-07 VITALS
RESPIRATION RATE: 16 BRPM | HEART RATE: 68 BPM | DIASTOLIC BLOOD PRESSURE: 76 MMHG | TEMPERATURE: 97.9 F | BODY MASS INDEX: 35.02 KG/M2 | WEIGHT: 223.6 LBS | SYSTOLIC BLOOD PRESSURE: 111 MMHG

## 2023-06-07 DIAGNOSIS — L70.0 ACNE VULGARIS: Primary | ICD-10-CM

## 2023-06-07 PROCEDURE — 99213 OFFICE O/P EST LOW 20 MIN: CPT | Performed by: FAMILY MEDICINE

## 2023-06-07 NOTE — PROGRESS NOTES
Name: Deyanira De Leon      : 1995      MRN: [id number]  Encounter Provider: Kathleen Mooney DO  Encounter Date: 2023   Encounter department: [address] [place] [address]  Acne vulgaris  -     benzoyl peroxide (BREVOXYL) 4 % external liquid; Apply topically 2 (two) times a day    Reviewed triggers for potential causes of acne  Unchanged double potential causes for patient would be anticonvulsive medication Keppra  Potential modifiable risk factors include sunlight exposure, make-up products, stress, foods  Given handout on foods with low glycemic index  We will trial benzoyl peroxide liquid and discontinue benzoyl peroxide gel patient will continue to utilize the adapalene  We will follow-up in 8 weeks         Subjective      Patient presents for follow-up of acne  Patient states she has only been using the adapalene topical ointment with mild results  States she did not like the benzoyl peroxide gel  Denies ever following up with dermatology as she previously requested a consult for  Main concern is area of chin  Denies any use of chinstrap for work or recreational requirements  Patient utilizes oil-based make-up products  Unsure about sunscreen products  Denies any oral contraceptive or acne fluctuating with menses  Denies her skin ever looking inflamed around the acne      Review of Systems    Current Outpatient Medications on File Prior to Visit   Medication Sig   • adapalene (DIFFERIN) 0 1 % cream APPLY TOPICALLY DAILY AT BEDTIME   • ibuprofen (MOTRIN) 600 mg tablet Take 1 tablet (600 mg total) by mouth every 6 (six) hours as needed for moderate pain   • [DISCONTINUED] benzoyl peroxide (Acne Medication 5) 5 % gel Apply topically 2 (two) times a day to the affected area   • diazePAM, 20 MG Dose, (Valtoco 20 MG Dose) 2 x 10 MG/0 1ML LQPK 10 mg into each nostril as needed (for seizure longer than 5 minutes) (Patient not taking: Reported on 5/24/2023)   • FLUoxetine (PROzac) 20 mg capsule Take 2 capsules (40 mg total) by mouth daily (Patient not taking: Reported on 2/11/2022)   • hydrOXYzine pamoate (VISTARIL) 25 mg capsule Take 1 capsule (25 mg total) by mouth 3 (three) times a day as needed for anxiety (Patient not taking: Reported on 2/2/2022)   • levETIRAcetam (Keppra) 500 mg tablet Take 1 tablet (500 mg total) by mouth every 12 (twelve) hours (Patient not taking: Reported on 5/24/2023)   • Lidocaine Viscous HCl (XYLOCAINE) 2 % mucosal solution Swish and spit 10 mL 3 (three) times a day as needed for mouth pain or discomfort (Patient not taking: Reported on 5/24/2023)   • norethindrone-ethinyl estradiol (FEMHRT 1/5) 1-5 MG-MCG TABS Take by mouth daily (Patient not taking: Reported on 5/24/2023)       Objective     /76   Pulse 68   Temp 97 9 °F (36 6 °C)   Resp 16   Wt 101 kg (223 lb 9 6 oz)   BMI 35 02 kg/m²     Physical Exam  Vitals and nursing note reviewed  HENT:      Head: Normocephalic  Nose: Nose normal    Eyes:      General:         Right eye: No discharge  Left eye: No discharge  Conjunctiva/sclera: Conjunctivae normal    Cardiovascular:      Rate and Rhythm: Normal rate  Pulmonary:      Effort: Pulmonary effort is normal    Abdominal:      General: There is no distension  Skin:     General: Skin is warm and dry  Comments: See image below   Neurological:      Mental Status: She is alert and oriented to person, place, and time  Psychiatric:         Behavior: Behavior normal          Thought Content:  Thought content normal          Judgment: Judgment normal        Kathryn Mooney DO

## 2023-07-03 ENCOUNTER — TELEPHONE (OUTPATIENT)
Dept: NEUROLOGY | Facility: CLINIC | Age: 28
End: 2023-07-03

## 2023-07-03 NOTE — TELEPHONE ENCOUNTER
[facility] called and stated that the patient passed out while in the grocery store today and doesn't remember passing out. Should the patient be seen sooner?     Patient can be reached at [phone]

## 2023-07-10 NOTE — TELEPHONE ENCOUNTER
Called re: below to gather more information about syncopal episode and left a VM with call back #. Awaiting return call. Dr Karen Lopez - pt has appointment with you on 9/5. Any need to see pt earlier. Note: Pt still needs to complete EEG and MRI.

## 2023-07-11 NOTE — PROGRESS NOTES
[address] [facility]    NAME: Roque Sandy  AGE: 33 y.o. SEX: female  : 1995     DATE: 2023     Assessment and Plan:     Problem List Items Addressed This Visit        Musculoskeletal and Integument    Acne vulgaris     Pt continues to have facial acne, well-controlld with benzoyl peroxide.    -Refilled Benzoyl peroxide         Relevant Medications    benzoyl peroxide (BREVOXYL) 4 % external liquid       Other    Obesity     Body mass index is 33.67 kg/m². Pt's BMI is elevated. Will check blood work. Relevant Orders    Comprehensive metabolic panel    Lipid panel    Menstrual cramps    Relevant Medications    ibuprofen (MOTRIN) 600 mg tablet    Seizure (HCC)     Pt has a history of generalized tonic clonic seizures, on Keppra 500mg BID and Valtoco 10mg nasal spray PRN. Pt reports that she does not take this medication daily. Pt had a recent ED visit at [address] [facility] for seizure on 7/3/23.    -Pt was counseled to take the Keppra twice daily every day as maintenance therapy. -Refilled Keppra and Valtoco         Relevant Medications    levETIRAcetam (Keppra) 500 mg tablet    Annual physical exam - Primary     Pt is a 32yo female with PMHx of seizures, HSV2 infection, acne vulgaris, and dysmenorrhea who presents to the office for annual physical exam.              Immunizations and preventive care screenings were discussed with patient today. Appropriate education was printed on patient's after visit summary.     33 y.o. female here for annual physical exam.     Immunization:  - COVID -- one dose (unsure of date)   - TDaP -- 1997, 1997, 1997  - Influenza -- denies administration  Screening: I have discussed each screening test below (per USPSTF guideline) with patient, to which patient expresses understanding and is agreeable to plan  - Obesity -- Positive (all children >5yo)  - Hypertension -- Negative (all adults)  - Nicotine/EtOH/Drugs -- Negative (all adults >19yo)  - Depression -- Negative (all adults AND adolescents 12-19YO)  Depression: Not at risk (8/10/2022)    PHQ-2    • PHQ-2 Score: 0     - HIV -- Declined (all adults age 14-79)  - Hep C -- Declined (all adults age 18-79)  Lab Results   Component Value Date/Time    HEPCAB Non-Reactive (q 08/19/2014 09:36 AM       Lab Results   Component Value Date    HGBA1C 5.4 08/19/2014     Lab Results   Component Value Date    GLUF 79 04/15/2021    [address] 99 04/15/2021    CREATININE 0.73 04/15/2021     Screening: (female specific):  - Cervical cancer - Will schedule PAP smear (Q3YR for 21-29YO, Q5YR + HPV for 31-64YO)  Case Report   Date Value Ref Range Status   07/24/2019   Final    Gynecologic Cytology Report                       Case: [id number]                                  Authorizing Provider:  MADDIE Cervantes           Collected:           07/24/2019 1503              Ordering Location:     [facility]       Received:            07/24/2019 1503                                     Practice [place]                                                           First Screen:          Deja Arguello                                                                  Specimen:    LIQUID-BASED PAP, DIAGNOSTIC, Cervix                                                        Primary Interpretation   Date Value Ref Range Status   07/24/2019 Negative for intraepithelial lesion or malignancy  Final     Specimen Adequacy   Date Value Ref Range Status   07/24/2019   Final    Satisfactory for evaluation. Endocervical/transformation zone component present.        - Intimate partner violence -- Not indicated (all female of reproductive age)          Health Maintenance   Topic Date Due   • COVID-19 Vaccine (1) Never done   • PT PLAN OF CARE  06/10/2018   • Cervical Cancer Screening  07/24/2022   • BMI: Followup Plan  08/13/2022   • Annual Physical 10/12/2022   • Influenza Vaccine (1) 09/01/2023   • BMI: Adult  07/12/2024   • DTaP,Tdap,and Td Vaccines (8 - Td or Tdap) 09/19/2028   • HIV Screening  Completed   • Hepatitis C Screening  Completed   • HIB Vaccine  Completed   • IPV Vaccine  Completed   • Hepatitis A Vaccine  Completed   • Meningococcal ACWY Vaccine  Completed   • HPV Vaccine  Completed   • Pneumococcal Vaccine: Pediatrics (0 to 5 Years) and At-Risk Patients (6 to 59 Years)  Aged Out   • Chlamydia Screening  Discontinued        Counseling:  Dental Health: discussed importance of regular tooth brushing, flossing, and dental visits. · Exercise: the importance of regular exercise/physical activity was discussed. Recommend exercise 3-5 times per week for at least 30 minutes. Return in about 4 weeks (around 8/9/2023) for Cervical Cancer Screening. Chief Complaint:     Chief Complaint   Patient presents with   • Physical Exam     seizures      History of Present Illness:     Adult Annual Physical   Patient here for a comprehensive physical exam. The patient reports problems - seizure. Diet and Physical Activity  · Diet/Nutrition: well balanced diet, frequent junk food, consuming 3-5 servings of fruits/vegetables daily, adequate fiber intake and adequate whole grain intake. · Exercise: 5-7 times a week on average and 30-60 minutes on average. Depression Screening  PHQ-2/9 Depression Screening           General Health  · Sleep: gets 4-6 hours of sleep on average. · Hearing: normal - bilateral.  · Vision: no vision problems. · Dental: regular dental visits, brushes teeth twice daily and flosses teeth occasionally. /GYN Health  · Last menstrual period: 7/1/23   · Contraceptive method: barrier methods, none. · History of STDs?: Herpes; 2016; no recurrnce     Review of Systems:     Review of Systems   Constitutional: Negative for chills, fatigue, fever and unexpected weight change.    HENT: Negative for congestion, ear discharge, ear pain, hearing loss, nosebleeds, postnasal drip, rhinorrhea, sinus pressure, sinus pain, sore throat and tinnitus. Eyes: Negative for pain, discharge and itching. Respiratory: Negative for cough, chest tightness, shortness of breath and wheezing. Cardiovascular: Negative for chest pain, palpitations and leg swelling. Gastrointestinal: Negative for abdominal distention, abdominal pain, blood in stool, constipation, diarrhea, nausea and vomiting. Genitourinary: Negative for difficulty urinating, dysuria, frequency, hematuria, menstrual problem and pelvic pain. Neurological: Positive for seizures. Negative for dizziness, weakness, light-headedness, numbness and headaches. Psychiatric/Behavioral: Negative for confusion, sleep disturbance and suicidal ideas. The patient is not nervous/anxious. Past Medical History:     Past Medical History:   Diagnosis Date   • Chest pain    • Menstrual cramps 11/14/2018   • Nephrolithiasis 7/12/2018   • Palpitations    • Pelvic pain 4/30/2015   • Syncope       Past Surgical History:     History reviewed. No pertinent surgical history. Social History:     Social History     Socioeconomic History   • Marital status: Single     Spouse name: None   • Number of children: None   • Years of education: None   • Highest education level: None   Occupational History   • Occupation: college student   Tobacco Use   • Smoking status: Never   • Smokeless tobacco: Never   Vaping Use   • Vaping Use: Never used   Substance and Sexual Activity   • Alcohol use: No   • Drug use: No   • Sexual activity: Yes     Partners: Male   Other Topics Concern   • None   Social History Narrative    Always uses seat belt    College student     ancestry    Lives with parents, and 2 brothers. No smokers. Pt reports feeling safe at home.      Primary spoken language English    Unemployed      Social Determinants of Health     Financial Resource Strain: Low Risk  (7/12/2023) Overall Financial Resource Strain (CARDIA)    • Difficulty of Paying Living Expenses: Not hard at all   Food Insecurity: No Food Insecurity (7/12/2023)    Hunger Vital Sign    • Worried About Running Out of Food in the Last Year: Never true    • Ran Out of Food in the Last Year: Never true   Transportation Needs: No Transportation Needs (7/12/2023)    PRAPARE - Transportation    • Lack of Transportation (Medical): No    • Lack of Transportation (Non-Medical):  No   Physical Activity: Not on file   Stress: Not on file   Social Connections: Not on file   Intimate Partner Violence: Not At Risk (8/10/2022)    Humiliation, Afraid, Rape, and Kick questionnaire    • Fear of Current or Ex-Partner: No    • Emotionally Abused: No    • Physically Abused: No    • Sexually Abused: No   Housing Stability: Low Risk  (8/10/2022)    Housing Stability Vital Sign    • Unable to Pay for Housing in the Last Year: No    • Number of Places Lived in the Last Year: 1    • Unstable Housing in the Last Year: No      Family History:     Family History   Problem Relation Age of Onset   • No Known Problems Mother    • Diabetes Father    • Other Father         tumor    • Diabetes Maternal Grandfather    • Diabetes Paternal Grandfather    • Cancer Paternal Aunt       Current Medications:     Current Outpatient Medications   Medication Sig Dispense Refill   • adapalene (DIFFERIN) 0.1 % cream APPLY TOPICALLY DAILY AT BEDTIME 45 g 0   • benzoyl peroxide (BREVOXYL) 4 % external liquid Apply topically 2 (two) times a day 150 mL 0   • FLUoxetine (PROzac) 20 mg capsule Take 2 capsules (40 mg total) by mouth daily 180 capsule 1   • hydrOXYzine pamoate (VISTARIL) 25 mg capsule Take 1 capsule (25 mg total) by mouth 3 (three) times a day as needed for anxiety 90 capsule 1   • ibuprofen (MOTRIN) 600 mg tablet Take 1 tablet (600 mg total) by mouth every 6 (six) hours as needed for moderate pain 30 tablet 0   • levETIRAcetam (Keppra) 500 mg tablet Take 1 tablet (500 mg total) by mouth every 12 (twelve) hours 120 tablet 3   • diazePAM, 20 MG Dose, (Valtoco 20 MG Dose) 2 x 10 MG/0.1ML LQPK 10 mg into each nostril as needed (for seizure longer than 5 minutes) (Patient not taking: Reported on 5/24/2023) 1 each 0   • Lidocaine Viscous HCl (XYLOCAINE) 2 % mucosal solution Swish and spit 10 mL 3 (three) times a day as needed for mouth pain or discomfort (Patient not taking: Reported on 5/24/2023) 100 mL 0   • norethindrone-ethinyl estradiol (FEMHRT 1/5) 1-5 MG-MCG TABS Take by mouth daily (Patient not taking: Reported on 5/24/2023)       No current facility-administered medications for this visit. Allergies:     No Known Allergies   Physical Exam:     /70 (BP Location: Right arm, Patient Position: Sitting, Cuff Size: Large)   Pulse 75   Temp 98.6 °F (37 °C) (Temporal)   Ht 5' 7" (1.702 m)   Wt 97.5 kg (215 lb)   BMI 33.67 kg/m²     Physical Exam  Constitutional:       Appearance: Normal appearance. She is obese. HENT:      Head: Normocephalic and atraumatic. Right Ear: Tympanic membrane, ear canal and external ear normal.      Left Ear: Tympanic membrane, ear canal and external ear normal.      Nose: Nose normal.      Mouth/Throat:      Mouth: Mucous membranes are moist.      Pharynx: Oropharynx is clear. No oropharyngeal exudate or posterior oropharyngeal erythema. Eyes:      Extraocular Movements: Extraocular movements intact. Conjunctiva/sclera: Conjunctivae normal.      Pupils: Pupils are equal, round, and reactive to light. Cardiovascular:      Rate and Rhythm: Normal rate and regular rhythm. Heart sounds: Normal heart sounds. No murmur heard. No friction rub. No gallop. Pulmonary:      Effort: Pulmonary effort is normal.      Breath sounds: Normal breath sounds. No stridor. No wheezing, rhonchi or rales. Abdominal:      General: Abdomen is flat. Bowel sounds are normal. There is no distension. Palpations: Abdomen is soft. Tenderness: There is no abdominal tenderness. There is no guarding. Musculoskeletal:         General: No swelling. Normal range of motion. Cervical back: Normal range of motion and neck supple. Right lower leg: No edema. Left lower leg: No edema. Lymphadenopathy:      Cervical: No cervical adenopathy. Skin:     General: Skin is warm. Neurological:      General: No focal deficit present. Mental Status: She is alert and oriented to person, place, and time. Mental status is at baseline. Psychiatric:         Mood and Affect: Mood normal.         Behavior: Behavior normal.         Thought Content:  Thought content normal.         Judgment: Judgment normal.          DO Kathryn Toscano

## 2023-07-12 ENCOUNTER — OFFICE VISIT (OUTPATIENT)
Dept: FAMILY MEDICINE CLINIC | Facility: CLINIC | Age: 28
End: 2023-07-12

## 2023-07-12 VITALS
WEIGHT: 215 LBS | HEART RATE: 75 BPM | TEMPERATURE: 98.6 F | DIASTOLIC BLOOD PRESSURE: 70 MMHG | HEIGHT: 67 IN | SYSTOLIC BLOOD PRESSURE: 103 MMHG | BODY MASS INDEX: 33.74 KG/M2

## 2023-07-12 DIAGNOSIS — Z00.00 ANNUAL PHYSICAL EXAM: Primary | ICD-10-CM

## 2023-07-12 DIAGNOSIS — N94.6 MENSTRUAL CRAMPS: ICD-10-CM

## 2023-07-12 DIAGNOSIS — R56.9 SEIZURE (HCC): ICD-10-CM

## 2023-07-12 DIAGNOSIS — L70.0 ACNE VULGARIS: ICD-10-CM

## 2023-07-12 DIAGNOSIS — E66.09 CLASS 1 OBESITY DUE TO EXCESS CALORIES WITHOUT SERIOUS COMORBIDITY WITH BODY MASS INDEX (BMI) OF 33.0 TO 33.9 IN ADULT: ICD-10-CM

## 2023-07-12 PROCEDURE — 99395 PREV VISIT EST AGE 18-39: CPT | Performed by: FAMILY MEDICINE

## 2023-07-12 RX ORDER — LEVETIRACETAM 500 MG/1
500 TABLET ORAL EVERY 12 HOURS SCHEDULED
Qty: 120 TABLET | Refills: 3 | Status: SHIPPED | OUTPATIENT
Start: 2023-07-12

## 2023-07-12 RX ORDER — DIAZEPAM 10 MG/100UL
10 SPRAY NASAL AS NEEDED
Qty: 1 EACH | Refills: 0 | Status: SHIPPED | OUTPATIENT
Start: 2023-07-12

## 2023-07-12 RX ORDER — IBUPROFEN 600 MG/1
600 TABLET ORAL EVERY 6 HOURS PRN
Qty: 30 TABLET | Refills: 0 | Status: SHIPPED | OUTPATIENT
Start: 2023-07-12

## 2023-07-12 NOTE — ASSESSMENT & PLAN NOTE
Pt continues to have facial acne, well-controlld with benzoyl peroxide.    -Refilled Benzoyl peroxide

## 2023-07-12 NOTE — ASSESSMENT & PLAN NOTE
Pt has a history of generalized tonic clonic seizures, on Keppra 500mg BID and Valtoco 10mg nasal spray PRN. Pt reports that she does not take this medication daily. Pt had a recent ED visit at [address] [facility] [address] for seizure on 7/3/23.    -Pt was counseled to take the Keppra twice daily every day as maintenance therapy.   -Refilled Keppra and Valtoco

## 2023-07-12 NOTE — ASSESSMENT & PLAN NOTE
Pt is a 32yo female with PMHx of seizures, HSV2 infection, acne vulgaris, and dysmenorrhea who presents to the office for annual physical exam.

## 2023-07-14 RX ORDER — BENZOYL PEROXIDE 50 MG/ML
LIQUID TOPICAL 2 TIMES DAILY
Qty: 150 ML | Refills: 0 | Status: SHIPPED | OUTPATIENT
Start: 2023-07-14

## 2023-08-11 ENCOUNTER — ANNUAL EXAM (OUTPATIENT)
Dept: FAMILY MEDICINE CLINIC | Facility: CLINIC | Age: 28
End: 2023-08-11

## 2023-08-11 VITALS
HEIGHT: 67 IN | DIASTOLIC BLOOD PRESSURE: 74 MMHG | HEART RATE: 98 BPM | OXYGEN SATURATION: 98 % | SYSTOLIC BLOOD PRESSURE: 130 MMHG | BODY MASS INDEX: 33.43 KG/M2 | TEMPERATURE: 98 F | WEIGHT: 213 LBS

## 2023-08-11 DIAGNOSIS — L70.0 ACNE VULGARIS: ICD-10-CM

## 2023-08-11 DIAGNOSIS — N89.8 VAGINAL DISCHARGE: Primary | ICD-10-CM

## 2023-08-11 DIAGNOSIS — Z12.4 SCREENING FOR CERVICAL CANCER: ICD-10-CM

## 2023-08-11 PROCEDURE — 87660 TRICHOMONAS VAGIN DIR PROBE: CPT

## 2023-08-11 PROCEDURE — 99395 PREV VISIT EST AGE 18-39: CPT | Performed by: FAMILY MEDICINE

## 2023-08-11 PROCEDURE — 87491 CHLMYD TRACH DNA AMP PROBE: CPT

## 2023-08-11 PROCEDURE — 87480 CANDIDA DNA DIR PROBE: CPT

## 2023-08-11 PROCEDURE — G0145 SCR C/V CYTO,THINLAYER,RESCR: HCPCS

## 2023-08-11 PROCEDURE — 99213 OFFICE O/P EST LOW 20 MIN: CPT | Performed by: FAMILY MEDICINE

## 2023-08-11 PROCEDURE — 87591 N.GONORRHOEAE DNA AMP PROB: CPT

## 2023-08-11 PROCEDURE — 87510 GARDNER VAG DNA DIR PROBE: CPT

## 2023-08-11 RX ORDER — BENZOYL PEROXIDE 50 MG/ML
LIQUID TOPICAL 2 TIMES DAILY
Qty: 150 ML | Refills: 0 | Status: SHIPPED | OUTPATIENT
Start: 2023-08-11

## 2023-08-11 RX ORDER — FLUCONAZOLE 150 MG/1
150 TABLET ORAL ONCE
Qty: 1 TABLET | Refills: 0 | Status: SHIPPED | OUTPATIENT
Start: 2023-08-11 | End: 2023-08-11

## 2023-08-11 RX ORDER — ADAPALENE 0.1 G/100G
CREAM TOPICAL
Qty: 45 G | Refills: 0 | Status: SHIPPED | OUTPATIENT
Start: 2023-08-11 | End: 2023-08-17 | Stop reason: SDUPTHER

## 2023-08-11 NOTE — PROGRESS NOTES
ASSESSMENT & PLAN: Claudean Mocha is a 33 y.o. No obstetric history on file. with normal gynecologic exam.    1.  Routine well woman exam done today  2. Pap and HPV:  The patient's last pap was done in 2019. Pap and cotesting was not done today. Current ASCCP Guidelines reviewed. 3. The following were reviewed in today's visit: breast self exam, STD testing, HIV risk factors and prevention, use and side effects of OCPs, family planning choices, exercise and healthy diet  4. Symptoms of Vaginal discharge: will send diflucan 150mg x1 as patient has history of recurrent yeast infections. - not much discharge was noted on exam today  - could be an early yeast infection vs discharge variation associated with ovulation      Problem List Items Addressed This Visit        Other    Vaginal discharge - Primary    Relevant Medications    fluconazole (DIFLUCAN) 150 mg tablet    Other Relevant Orders    VAGINOSIS DNA PROBE (AFFIRM)   Other Visit Diagnoses     Screening for cervical cancer        Relevant Orders    Liquid-based pap, screening    Chlamydia/GC amplified DNA by PCR          CC:  Annual Gynecologic Examination    HPI: Claudean Mocha is a 33 y.o. No obstetric history on file. who presents for annual gynecologic examination. She has the following concerns:  Vaginal discharge for 1 week. No foul smell, milky white in color. Health Maintenance:    She exercises 7 days per week with walking. She wears her seatbelt routinely. She does not perform regular monthly self breast exams. She feels safe at home. Patients does follow a plant based diet. Her 7/24/19 negative    Last mammogram: not indicated  Last colonoscopy: not indicated     Past Medical History:   Diagnosis Date   • Chest pain    • Menstrual cramps 11/14/2018   • Nephrolithiasis 7/12/2018   • Palpitations    • Pelvic pain 4/30/2015   • Syncope        History reviewed. No pertinent surgical history.     Past OB/Gyn History:  OB History No obstetric history on file. LMP 8/1/23 - every month, last for 5 days   History of sexually transmitted infection No  History of abnormal pap smears  No     Patient is currently sexually active. heterosexual Birth control: none. Family History   Problem Relation Age of Onset   • No Known Problems Mother    • Diabetes Father    • Other Father         tumor    • Diabetes Maternal Grandfather    • Diabetes Paternal Grandfather    • Cancer Paternal Aunt        Social History:  Social History     Socioeconomic History   • Marital status: Single     Spouse name: Not on file   • Number of children: Not on file   • Years of education: Not on file   • Highest education level: Not on file   Occupational History   • Occupation: college student   Tobacco Use   • Smoking status: Never   • Smokeless tobacco: Never   Vaping Use   • Vaping Use: Never used   Substance and Sexual Activity   • Alcohol use: No   • Drug use: No   • Sexual activity: Yes     Partners: Male   Other Topics Concern   • Not on file   Social History Narrative    Always uses seat belt    College student     ancestry    Lives with parents, and 2 brothers. No smokers. Pt reports feeling safe at home. Primary spoken language English    Unemployed      Social Determinants of Health     Financial Resource Strain: Low Risk  (7/12/2023)    Overall Financial Resource Strain (CARDIA)    • Difficulty of Paying Living Expenses: Not hard at all   Food Insecurity: No Food Insecurity (7/12/2023)    Hunger Vital Sign    • Worried About Running Out of Food in the Last Year: Never true    • Ran Out of Food in the Last Year: Never true   Transportation Needs: No Transportation Needs (7/12/2023)    PRAPARE - Transportation    • Lack of Transportation (Medical): No    • Lack of Transportation (Non-Medical):  No   Physical Activity: Not on file   Stress: Not on file   Social Connections: Not on file   Intimate Partner Violence: Not At Risk (8/10/2022) Humiliation, Afraid, Rape, and Kick questionnaire    • Fear of Current or Ex-Partner: No    • Emotionally Abused: No    • Physically Abused: No    • Sexually Abused: No   Housing Stability: Low Risk  (8/10/2022)    Housing Stability Vital Sign    • Unable to Pay for Housing in the Last Year: No    • Number of Places Lived in the Last Year: 1    • Unstable Housing in the Last Year: No     Presently lives with mom and brother. Patient is single. 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    No Known Allergies    Current Outpatient Medications:   •  adapalene (DIFFERIN) 0.1 % cream, APPLY TOPICALLY DAILY AT BEDTIME, Disp: 45 g, Rfl: 0  •  benzoyl peroxide 5 % external liquid, Apply topically 2 (two) times a day Apply topically 2 (two) times per day, Disp: 150 mL, Rfl: 0  •  diazePAM, 20 MG Dose, (Valtoco 20 MG Dose) 2 x 10 MG/0.1ML LQPK, 10 mg into each nostril as needed (for seizure longer than 5 minutes), Disp: 1 each, Rfl: 0  •  fluconazole (DIFLUCAN) 150 mg tablet, Take 1 tablet (150 mg total) by mouth once for 1 dose, Disp: 1 tablet, Rfl: 0  •  FLUoxetine (PROzac) 20 mg capsule, Take 2 capsules (40 mg total) by mouth daily, Disp: 180 capsule, Rfl: 1  •  hydrOXYzine pamoate (VISTARIL) 25 mg capsule, Take 1 capsule (25 mg total) by mouth 3 (three) times a day as needed for anxiety, Disp: 90 capsule, Rfl: 1  •  ibuprofen (MOTRIN) 600 mg tablet, Take 1 tablet (600 mg total) by mouth every 6 (six) hours as needed for moderate pain, Disp: 30 tablet, Rfl: 0  •  levETIRAcetam (Keppra) 500 mg tablet, Take 1 tablet (500 mg total) by mouth every 12 (twelve) hours, Disp: 120 tablet, Rfl: 3  •  Lidocaine Viscous HCl (XYLOCAINE) 2 % mucosal solution, Swish and spit 10 mL 3 (three) times a day as needed for mouth pain or discomfort, Disp: 100 mL, Rfl: 0  •  norethindrone-ethinyl estradiol (FEMHRT 1/5) 1-5 MG-MCG TABS, Take by mouth daily, Disp: , Rfl:     Review of Systems:  Review of Systems   Constitutional: Negative for chills and fever. Eyes: Negative for visual disturbance. Respiratory: Negative for cough and shortness of breath. Cardiovascular: Negative for chest pain and palpitations. Gastrointestinal: Negative for abdominal pain and vomiting. Genitourinary: Positive for vaginal discharge. Negative for dysuria and hematuria. Musculoskeletal: Negative for arthralgias and back pain. Skin: Negative for color change and rash. Neurological: Negative for dizziness and headaches. All other systems reviewed and are negative. Physical Exam:  Physical Exam  Vitals reviewed. Exam conducted with a chaperone present. Constitutional:       Appearance: Normal appearance. HENT:      Head: Normocephalic and atraumatic. Nose: Nose normal.      Mouth/Throat:      Mouth: Mucous membranes are moist.      Pharynx: Oropharynx is clear. Eyes:      Conjunctiva/sclera: Conjunctivae normal.   Cardiovascular:      Rate and Rhythm: Normal rate and regular rhythm. Heart sounds: Normal heart sounds. No murmur heard. No friction rub. No gallop. Pulmonary:      Effort: Pulmonary effort is normal.      Breath sounds: No wheezing, rhonchi or rales. Abdominal:      General: Abdomen is flat. Bowel sounds are normal.      Palpations: Abdomen is soft. There is no mass. Tenderness: There is no abdominal tenderness. Hernia: There is no hernia in the left inguinal area or right inguinal area. Genitourinary:     Exam position: Lithotomy position. Pubic Area: No rash or pubic lice. Nestor stage (genital): 5. Labia:         Right: No rash, tenderness, lesion or injury. Left: No rash, tenderness, lesion or injury. Urethra: No prolapse, urethral pain, urethral swelling or urethral lesion. Vagina: Normal.      Cervix: Normal.      Uterus: Normal.    Musculoskeletal:         General: Normal range of motion. Cervical back: Neck supple. Lymphadenopathy:      Lower Body: No right inguinal adenopathy. No left inguinal adenopathy. Skin:     General: Skin is warm and dry. Neurological:      Mental Status: She is alert.

## 2023-08-13 LAB
CANDIDA RRNA VAG QL PROBE: NEGATIVE
G VAGINALIS RRNA GENITAL QL PROBE: POSITIVE
T VAGINALIS RRNA GENITAL QL PROBE: NEGATIVE

## 2023-08-14 LAB
C TRACH DNA SPEC QL NAA+PROBE: NEGATIVE
N GONORRHOEA DNA SPEC QL NAA+PROBE: NEGATIVE

## 2023-08-16 ENCOUNTER — TELEPHONE (OUTPATIENT)
Dept: FAMILY MEDICINE CLINIC | Facility: CLINIC | Age: 28
End: 2023-08-16

## 2023-08-16 NOTE — TELEPHONE ENCOUNTER
On 8/11/23, patient was prescribed Adapalene cream. Patient has medical assistance, they do not accept Rx from resident. 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

## 2023-08-17 DIAGNOSIS — L70.0 ACNE VULGARIS: ICD-10-CM

## 2023-08-17 LAB
LAB AP GYN PRIMARY INTERPRETATION: NORMAL
Lab: NORMAL

## 2023-08-17 RX ORDER — ADAPALENE 0.1 G/100G
CREAM TOPICAL
Qty: 45 G | Refills: 0 | Status: SHIPPED | OUTPATIENT
Start: 2023-08-17 | End: 2023-08-17

## 2023-08-17 RX ORDER — ADAPALENE 0.1 G/100G
CREAM TOPICAL
Qty: 45 G | Refills: 0 | Status: SHIPPED | OUTPATIENT
Start: 2023-08-17 | End: 2023-08-18

## 2023-08-18 DIAGNOSIS — L70.0 ACNE VULGARIS: ICD-10-CM

## 2023-08-18 RX ORDER — ADAPALENE 0.1 G/100G
CREAM TOPICAL
Qty: 45 G | Refills: 0 | Status: SHIPPED | OUTPATIENT
Start: 2023-08-18

## 2023-08-18 RX ORDER — ADAPALENE 0.1 G/100G
CREAM TOPICAL
Qty: 45 G | Refills: 0 | Status: SHIPPED | OUTPATIENT
Start: 2023-08-18 | End: 2023-08-18

## 2023-09-05 ENCOUNTER — OFFICE VISIT (OUTPATIENT)
Dept: NEUROLOGY | Facility: CLINIC | Age: 28
End: 2023-09-05
Payer: MEDICARE

## 2023-09-05 VITALS
HEART RATE: 72 BPM | OXYGEN SATURATION: 98 % | BODY MASS INDEX: 34.88 KG/M2 | WEIGHT: 222.2 LBS | TEMPERATURE: 98.3 F | SYSTOLIC BLOOD PRESSURE: 130 MMHG | HEIGHT: 67 IN | DIASTOLIC BLOOD PRESSURE: 82 MMHG

## 2023-09-05 DIAGNOSIS — R56.9 SEIZURE (HCC): ICD-10-CM

## 2023-09-05 DIAGNOSIS — R56.9 SEIZURE (HCC): Primary | ICD-10-CM

## 2023-09-05 PROCEDURE — 99215 OFFICE O/P EST HI 40 MIN: CPT | Performed by: STUDENT IN AN ORGANIZED HEALTH CARE EDUCATION/TRAINING PROGRAM

## 2023-09-05 RX ORDER — LAMOTRIGINE 25 MG/1
150 TABLET ORAL 2 TIMES DAILY
Qty: 360 TABLET | Refills: 0 | Status: SHIPPED | OUTPATIENT
Start: 2023-09-05

## 2023-09-05 NOTE — PROGRESS NOTES
[facility] -   Follow up appointment    Impression/Plan    Colletta Pardon is a 33 y.o. female with a PMH of paroxysmal spells presenting to the [facility] for follow up. She remained episode free despite not taking her Keppra until this summer after an unprovoked episode happened in a public place. The event had semiology that could be seen with either FTBTC or PNES. She is willing to start another AED and complete previously ordered tests. Plan outlined below:    #Epilepsy vs. PNES  - MRI brain w/w/o contrast  - Routine EEG  - Lamotrigine titration to 150 BID then blood work to confirm levels    - Follow up in 3 months    We discussed the pathophysiology of epilepsy/seizure and seizure safety/precautions including driving restrictions following seizures. We discussed factors that can lower seizure threshold and the side effects of antiepileptic medications. Patient also made aware of pregnancy and AEDs and that she should inform me if/when she does become pregnant to increase the dose of the medication. Diagnoses and all orders for this visit:    Seizure ([address])  -     lamoTRIgine (LaMICtal) 25 mg tablet; Take 6 tablets (150 mg total) by mouth 2 (two) times a day Titrate up to this dose as outlined by Dr. Kamala Jansen  -     Lamotrigine level; Future  -     Comprehensive metabolic panel; Future  -     MRI brain seizure wo and w contrast; Future  -     EEG awake or drowsy routine; Future        Subjective    Colletta Pardon is a 33 y.o. female with a PMH of paroxysmal spells presenting to the [facility] for follow up. For review:    Her mother who has witnessed all of her episodes. She reports her first lifetime episode was when she was 24.  She was with her mother in the park and she suddenly pitched forward onto concrete causing a scalp laceration and was noted to be shaking for about 10 minutes (full body) with eyes closed and deviated upward. She was not aware of anyone speaking to her during this episode and her first memory is waking up in the ED about 1-2 mins after she was administered a benzodiazepine and then she was somewhat off of her normal self for the next day. Since that first episode she has had about 5-6 episodes approximately 1x per year and each episode has been the same. Geovanna Baez is aware an episode is coming on at this point and will tell her mother and then sit down and then her eyes roll back, she closes her eyes and experiences full body shaking for 10-15 minutes. On each occasion EMS was called and administered a benzodiazepine which terminated the event and she regained consciousness shortly after. She has never let an episode run its course.      She has previously undergone CTH and was normal. Labs at each hospitalization have been normal. She also underwent routine EEG in 2019 which was normal. She has never been started on AED prior to my first appointment. She has also been extensively worked up by cardiology for the possibility that these were syncopal events and workup including cardiac MRI was unremarkable.     Her most recent episode was on 1/22/2022. She was watching a movie by herself and reported that she could feel an event coming on and sat down and the event progressed same as previous. At that time she presented to the ED at [facility] and was administered benzodiazepines and the episode resolved. She was told not to drive and reportedly [employer] form was filed on her behalf. The patient established care with me on 2/11/2022. The etiology of her events was unclear at that point. The associated injury with one episode, loss of awareness and responsiveness to benzos was consistent with epilepsy. However, the duration of events and eye closure with events is more consistent with PNES. I had a low suspicion that these are syncopal in nature.  Out of an abundance of caution we started Keppra 500 BID and recommended a routine EEG and MRI brain. There was also a plan for ambulatory EEG thereafter. Interval history:    The patient was lost to follow up for 18 months and didn't complete the ordered diagnostic testing. On 7/3/2023 the patient was seen at urgent care and suffered a syncopal episode. Pt was not incontinent. No seen seizure activity per EMS. Pt did not answer questions and refuses to open her mouth at the time. Today the patient and mother discuss the event: The day of the event was a normal day. She ate breakfast and it was a very hot day. She was shopping with family when she felt like the seizure was coming on, she felt hot and that her vision was leaving. She said that "she couldn't see". Thereafter she vomited her breakfast. She was able to tell family. The seizure shaking lasted about 10 minutes. Eyes were moving left to right. The patient woke up in the hospital.    This is the only one that happened in the last 18 months. She tried the Keppra, she was taking it daily for one month before stopping it. There has been a delay to seeing use due to stessors and deaths in the family. History Reviewed: The following were reviewed and updated as appropriate: allergies, current medications, past family history, past medical history, past social history, past surgical history and problem list    ROS:  Review of Systems   Constitutional: Negative. HENT: Negative. Eyes: Negative. Respiratory: Negative. Cardiovascular: Negative. Gastrointestinal: Negative. Endocrine: Negative. Genitourinary: Negative. Musculoskeletal: Negative. Skin: Negative. Allergic/Immunologic: Negative. Hematological: Negative. Psychiatric/Behavioral: Negative. All other systems reviewed and are negative.       Objective    /82 (BP Location: Right arm, Patient Position: Sitting, Cuff Size: Adult)   Pulse 72   Temp 98.3 °F (36.8 °C) (Temporal)   Ht 5' 7" (1.702 m)   Wt 101 kg (222 lb 3.2 oz)   SpO2 98%   BMI 34.80 kg/m²      General Exam  General: well developed, no acute distress. HEENT: mucous membranes moist, anicteric sclera. Neck: supple, good ROM. Extremities: no clubbing, cyanosis or edema. Skin: no rash on visible skin. Neurological Exam  Mental Status: awake, alert, and fully oriented to person, place, time, and situation. Attention and memory intact. Fund of knowledge is appropriate for age and education. There is no neglect. Language: fluency, and comprehension normal.       Cranial Nerves: Pupils equal and reactive to light. Visual fields full to confrontation. Extraocular motions intact with full versions, normal pursuits and saccades. Facial strength full and symmetric. Hearing intact to voice. Tongue protrudes to midline. Palate elevates symmetrically. Speech clear without notable dysarthria. Motor: Normal bulk and tone. No pronator drift. Strength is 5/5 proximally and distally in all 4 extremities. No involuntary movements. Sensory: Sensation intact to light touch distally in all extremities. Coordination: Normal finger-to-nose. Normal rapid alternating movements. Station and gait: Casual and tandem gait normal. Normal Romberg. Reflexes: Reflexes 2+ throughout and symmetric.       [facility]  [address] [place] [address] Neurology and Clinical Neurophysiology

## 2023-09-05 NOTE — PATIENT INSTRUCTIONS
Lamotrigine Instructions    (# of 25 mg pills for AM and PM dose)    Week 1-2:  0 AM / 1 PM  Week 3-4:  1 AM / 1 PM  Week 5: 2 AM / 2 PM  Week 6:  3 AM / 3 PM  Week 7:  4 AM / 4 PM  Week 8:  5 AM / 5 PM  Week 9: begin taking 150 mg twice daily (change to 150 mg pills)    Lamotrigine can cause a dangerous rash. Contact a physician immediately if you develop a rash. Once on this dose, taking 150 mg twice day please get a level checked.  Once the level is OK then we can start 150 mg tablets twice a day

## 2023-09-08 NOTE — TELEPHONE ENCOUNTER
ID: [id number]  GROUP: [id number]  PCN: [id number]  BIN: [id number]    Joseph: CARLOS ENRIQUE Freed submitted, awaiting determination.

## 2023-09-11 ENCOUNTER — TELEPHONE (OUTPATIENT)
Dept: NEUROLOGY | Facility: CLINIC | Age: 28
End: 2023-09-11

## 2023-09-11 RX ORDER — LAMOTRIGINE 25 MG/1
TABLET ORAL
Refills: 0 | OUTPATIENT
Start: 2023-09-11

## 2023-09-11 NOTE — TELEPHONE ENCOUNTER
Call returned to patient. Notified lamotrigine was just approved through insurance and message was left with pharmacy earlier today. Requested she follow up with pharmacy.

## 2023-09-11 NOTE — TELEPHONE ENCOUNTER
Patient called she was given the wrong medication from the pharmacy. They gave her Keppra which she was taking before. Patient would like the correct medication that she was prescribe by Dr Constantine Apley. Patient was last seen 9/5/23. Please send the right medication to [facility] [place] [facility].

## 2023-09-11 NOTE — TELEPHONE ENCOUNTER
PA approved through 11/8/2023. Message left for pharmacy to notify of approval.    Please refuse replacement suggestion.

## 2023-09-20 ENCOUNTER — HOSPITAL ENCOUNTER (OUTPATIENT)
Dept: NEUROLOGY | Facility: CLINIC | Age: 28
Discharge: HOME | End: 2023-09-20
Payer: MEDICARE

## 2023-09-20 DIAGNOSIS — R56.9 SEIZURE (HCC): ICD-10-CM

## 2023-09-20 PROCEDURE — 95816 EEG AWAKE AND DROWSY: CPT

## 2023-09-21 PROCEDURE — 95816 EEG AWAKE AND DROWSY: CPT | Performed by: PSYCHIATRY & NEUROLOGY

## 2023-09-27 ENCOUNTER — HOSPITAL ENCOUNTER (OUTPATIENT)
Dept: RADIOLOGY | Facility: HOSPITAL | Age: 28
Discharge: HOME | End: 2023-09-27
Attending: STUDENT IN AN ORGANIZED HEALTH CARE EDUCATION/TRAINING PROGRAM
Payer: MEDICARE

## 2023-09-27 DIAGNOSIS — R56.9 SEIZURE (HCC): ICD-10-CM

## 2023-09-27 PROCEDURE — 70553 MRI BRAIN STEM W/O & W/DYE: CPT

## 2023-09-27 PROCEDURE — G1004 CDSM NDSC: HCPCS

## 2023-09-27 PROCEDURE — A9585 GADOBUTROL INJECTION: HCPCS | Performed by: STUDENT IN AN ORGANIZED HEALTH CARE EDUCATION/TRAINING PROGRAM

## 2023-09-27 RX ORDER — GADOBUTROL 604.72 MG/ML
10 INJECTION INTRAVENOUS
Status: COMPLETED | OUTPATIENT
Start: 2023-09-27 | End: 2023-09-27

## 2023-09-27 RX ADMIN — GADOBUTROL 10 ML: 604.72 INJECTION INTRAVENOUS at 21:36

## 2023-10-06 ENCOUNTER — TELEPHONE (OUTPATIENT)
Dept: NEUROLOGY | Facility: CLINIC | Age: 28
End: 2023-10-06

## 2023-10-06 NOTE — TELEPHONE ENCOUNTER
----- Message from Wisam Still MD sent at 10/6/2023  3:24 PM EDT -----  Can let patient know that MRI didn't show any cause of seizures. Can continue with current treatment plan and follow up as scheduled  ----- Message -----  From: Interface, Radiology Results In  Sent: 10/3/2023   7:51 AM EDT  To:  Wisam Still MD

## 2023-10-08 DIAGNOSIS — R56.9 SEIZURE (HCC): ICD-10-CM

## 2023-10-09 RX ORDER — LAMOTRIGINE 25 MG/1
TABLET ORAL
Qty: 360 TABLET | Refills: 0 | Status: SHIPPED | OUTPATIENT
Start: 2023-10-09

## 2023-11-14 DIAGNOSIS — R56.9 SEIZURE (HCC): ICD-10-CM

## 2023-11-15 RX ORDER — LAMOTRIGINE 25 MG/1
TABLET ORAL
Qty: 360 TABLET | Refills: 0 | Status: SHIPPED | OUTPATIENT
Start: 2023-11-15 | End: 2023-11-16 | Stop reason: SDUPTHER

## 2023-11-16 RX ORDER — LAMOTRIGINE 150 MG/1
TABLET ORAL
Qty: 60 TABLET | Refills: 5 | Status: SHIPPED | OUTPATIENT
Start: 2023-11-16

## 2023-11-16 NOTE — TELEPHONE ENCOUNTER
Called and spoke with pt, who states that she is taking Lamotrigine six 25 mg pills BID or 150 mg BID. She is agreeable to converting to the larger pill size. Please sign off if agreeable. Thank you. Chely Branham MD   to Neurology [facility] [address] Team 1       11/15/23  2:37 PM  Can we confirm the dosing that this patient is taking of the lamotrigine. Also we can remind her to get bloodwork done. If we are at goal with her medication then we can prescribe her a large pill size.

## 2023-12-04 ENCOUNTER — TELEPHONE (OUTPATIENT)
Dept: NEUROLOGY | Facility: CLINIC | Age: 28
End: 2023-12-04

## 2023-12-06 ENCOUNTER — APPOINTMENT (OUTPATIENT)
Dept: LAB | Age: 28
End: 2023-12-06
Payer: MEDICARE

## 2023-12-06 DIAGNOSIS — E66.09 CLASS 1 OBESITY DUE TO EXCESS CALORIES WITHOUT SERIOUS COMORBIDITY WITH BODY MASS INDEX (BMI) OF 33.0 TO 33.9 IN ADULT: ICD-10-CM

## 2023-12-06 DIAGNOSIS — R56.9 SEIZURE (HCC): ICD-10-CM

## 2023-12-06 LAB
ALBUMIN SERPL BCP-MCNC: 4.1 G/DL (ref 3.5–5)
ALP SERPL-CCNC: 66 U/L (ref 34–104)
ALT SERPL W P-5'-P-CCNC: 14 U/L (ref 7–52)
ANION GAP SERPL CALCULATED.3IONS-SCNC: 3 MMOL/L
AST SERPL W P-5'-P-CCNC: 20 U/L (ref 13–39)
BILIRUB SERPL-MCNC: 0.39 MG/DL (ref 0.2–1)
BUN SERPL-MCNC: 11 MG/DL (ref 5–25)
CALCIUM SERPL-MCNC: 9.1 MG/DL (ref 8.4–10.2)
CHLORIDE SERPL-SCNC: 106 MMOL/L (ref 96–108)
CHOLEST SERPL-MCNC: 189 MG/DL
CO2 SERPL-SCNC: 31 MMOL/L (ref 21–32)
CREAT SERPL-MCNC: 0.72 MG/DL (ref 0.6–1.3)
GFR SERPL CREATININE-BSD FRML MDRD: 115 ML/MIN/1.73SQ M
GLUCOSE P FAST SERPL-MCNC: 83 MG/DL (ref 65–99)
HDLC SERPL-MCNC: 43 MG/DL
LDLC SERPL CALC-MCNC: 123 MG/DL (ref 0–100)
NONHDLC SERPL-MCNC: 146 MG/DL
POTASSIUM SERPL-SCNC: 4.4 MMOL/L (ref 3.5–5.3)
PROT SERPL-MCNC: 6.7 G/DL (ref 6.4–8.4)
SODIUM SERPL-SCNC: 140 MMOL/L (ref 135–147)
TRIGL SERPL-MCNC: 113 MG/DL

## 2023-12-06 PROCEDURE — 80175 DRUG SCREEN QUAN LAMOTRIGINE: CPT

## 2023-12-06 PROCEDURE — 36415 COLL VENOUS BLD VENIPUNCTURE: CPT

## 2023-12-06 PROCEDURE — 80053 COMPREHEN METABOLIC PANEL: CPT

## 2023-12-06 PROCEDURE — 80061 LIPID PANEL: CPT

## 2023-12-08 LAB — LAMOTRIGINE SERPL-MCNC: 1.1 UG/ML (ref 2–20)

## 2023-12-11 ENCOUNTER — TELEPHONE (OUTPATIENT)
Dept: NEUROLOGY | Facility: CLINIC | Age: 28
End: 2023-12-11

## 2023-12-11 NOTE — TELEPHONE ENCOUNTER
----- Message from Jose Antonio Fajardo MD sent at 12/11/2023  1:10 PM EST -----  Lamotrigine level was 1.1. Todd Randall has an appointment with the patient in a week.  Can we confirm no missed doses and that she is taking the lamotrigine 150 BID?    -Dr. Princess Law  ----- Message -----  From: Lab, Background User  Sent: 12/8/2023   2:07 PM EST  To: Jose Antonio Fajardo MD

## 2023-12-18 ENCOUNTER — TELEPHONE (OUTPATIENT)
Dept: NEUROLOGY | Facility: CLINIC | Age: 28
End: 2023-12-18

## 2023-12-20 ENCOUNTER — OFFICE VISIT (OUTPATIENT)
Dept: NEUROLOGY | Facility: CLINIC | Age: 28
End: 2023-12-20
Payer: MEDICARE

## 2023-12-20 VITALS
BODY MASS INDEX: 35.22 KG/M2 | HEIGHT: 67 IN | SYSTOLIC BLOOD PRESSURE: 128 MMHG | DIASTOLIC BLOOD PRESSURE: 84 MMHG | WEIGHT: 224.4 LBS

## 2023-12-20 DIAGNOSIS — L98.9 SKIN LESION OF HAND: ICD-10-CM

## 2023-12-20 DIAGNOSIS — R56.9 SEIZURE (HCC): Primary | ICD-10-CM

## 2023-12-20 PROCEDURE — 99215 OFFICE O/P EST HI 40 MIN: CPT | Performed by: NURSE PRACTITIONER

## 2023-12-20 RX ORDER — FOLIC ACID 1 MG/1
1 TABLET ORAL DAILY
Qty: 90 TABLET | Refills: 3 | Status: SHIPPED | OUTPATIENT
Start: 2023-12-20

## 2023-12-20 RX ORDER — LAMOTRIGINE 150 MG/1
TABLET ORAL
Qty: 60 TABLET | Refills: 5 | Status: SHIPPED | OUTPATIENT
Start: 2023-12-20

## 2023-12-20 RX ORDER — LAMOTRIGINE 25 MG/1
TABLET ORAL 2 TIMES DAILY
Qty: 462 TABLET | Refills: 0 | Status: SHIPPED | OUTPATIENT
Start: 2023-12-20 | End: 2024-01-17

## 2023-12-20 RX ORDER — IBUPROFEN 600 MG/1
600 TABLET ORAL EVERY 6 HOURS PRN
Qty: 30 TABLET | Refills: 0 | Status: SHIPPED | OUTPATIENT
Start: 2023-12-20

## 2023-12-20 RX ORDER — DIAZEPAM 10 MG/100UL
10 SPRAY NASAL AS NEEDED
Qty: 1 EACH | Refills: 0 | Status: SHIPPED | OUTPATIENT
Start: 2023-12-20

## 2023-12-20 NOTE — PROGRESS NOTES
Patient ID: Lilliam Leyva is a 28 y.o. female with ,a PMH of paroxysmal spells presenting to the [facility] for follow up.      Assessment/Plan:    Seizure (HCC)  Patient has remained free of events concerning for seizure since her last visit. Unfortunately she misunderstood the titration instructions for her lamotrigine and has only been taking lamotrigine 25 mg BID. She was curious if this dose could be continued as she is tolerating this well without issues but discussed that this would not be an adequate dose to prevent seizures. She is understanding of this.     We reviewed the titration schedule that was provided at her last visit as well as with her mother and they are understanding. Discussed importance of following titration due to risk of dangerous rash. If rash develops during titration, they will call the office. Given pill boxes so that titration can be easily followed as well. Goal dose of 150 mg BID and blood work will be checked to ensure adequate level.While her seizures are relatively infrequent, would like to ensure she is on adequate AED therapy to prevent recurrence. If events continue despite escalation of therapy or if events become more frequent, low threshold for EMU admission for characterization of events. Prior EEGs have been normal in 2019 and 2023. MRI brain earlier this year with nonspecific foci of hyperintense signal on T2 and FLAIR imaging within the supratentorial white matter without associated enhancement, restricted diffusion, or susceptibility. Her neurologic exam is non-focal at today's visit.     She is not currently sexually active. No longer on birth control. Reviewed to ensure that her OBGYN is aware that she is taking lamotrigine if birth control is prescribed in the future as some OCPs may alter her lamotrigine levels (would likely require progesterone only formulation). Discussed that lamotrigine is considered a safe option for women of  childbearing age with seizures. While there are no current plans for pregnancy, discussed importance of folic acid 1 mg daily, prescription sent. Home supply of valtoco is , mother requested a new supply be sent to pharmacy.     Her neurologic exam is non-focal at today's visit. Incidentally noted on exam is a black lesion on the inside of her left hand. While this is reportedly a birthmark, it has changed recently, recommended dermatology evaluation to make sure this is monitored.     Patient or mother will call the office with further events concerning for seizures. Follow up in 3 months or sooner if needed with Dr Valencia.    Plan/patient instructions:  - Start the titration schedule as below  - Have blood work one week after being on 150 mg twice per day  - Start taking folic acid 1 mg daily  - If you want to take birth control in the future, make sure you OBGYN is aware that you take lamotrigine  - Let us know if there are any issues or side effects, rashes  - Call the office with seizures issues or concerns  - See dermatology  - Follow up in 3 months       Lamotrigine Instructions  (# of 25 mg pills for AM and PM dose)     Week 1:           2 tabs AM / 2 tabs PM  Week 2:           3 tabs AM / 3 tabs PM  Week 3:           4 tabs AM / 4 tabs PM  Week 4:           5 tabs AM / 5 tabs PM  Week 5:           switch to 150 mg tablets - take 150 mg twice daily      Lamotrigine can cause a dangerous rash. Contact a physician immediately if you develop a rash.     Once on 150 mg twice day please get a level checked. Once the level is OK then we can start 150 mg tablets twice a day            She will Return in about 3 months (around 3/20/2024) for Follow up with Dr Valencia.      Subjective:  Lilliam Leyva is a 28 y.o. female with ,a PMH of paroxysmal spells presenting to the [facility] for follow up.  She also has a past medical history of dysmenorrhea, acne, keratosis pilaris,  obesity, syncope, HSV-2, and dysuria.  She was last seen in the office by Dr. Valencia on 2023.  At that time it was noted that she remained free of episodes concerning for seizure despite not taking her Keppra until the summer when there was an unprovoked episode that occurred in a public place.  The event had semiology that could be seen either focal to bilateral tonic-clonic seizures PNES.  She was willing to start another AED at that time and complete previously ordered tests.  Recommended lamotrigine to goal dose of 150 mg twice per day, routine EEG, and MRI brain.  She was to follow-up in 3 months or sooner if needed.    Lamotrigine level was 1.1 when checked a little over a week ago.  Patient reported that she was taking 25 mg of lamotrigine twice per day and had not followed titration schedule.  She should have been on 150 mg twice per day at that point.    Patient presents to her visit today with her mother. She has not had further events concerning for seizures since her last visit. No longer taking levetiracetam. Taking lamotrigine 25 mg BID without side effects. So far, she is feeling well on this medication. She has been doing well since her last visit. Discussed the importance of getting to goal dose of medication as current dose likely is not providing adequate protection from seizures. She is understanding of this. Ensured patient and mother understood titration schedule.     Diazepam is , they would like a refill. She is requesting a refill of ibuprofen which she takes occasionally for headaches.     MRI brain 2023:  IMPRESSION:  Nonspecific foci of hyperintense signal on T2 and FLAIR imaging within the supratentorial white matter including the anterior superior juxtacortical white matter bilaterally and within the periatrial juxtacortical white matter on the right. No associated enhancement, restricted diffusion or susceptibility. Differential considerations would include  "postinflammatory abnormality such as demyelinating disease or sequela of Lyme disease. These changes could be developmental. Direct comparison to any prior   outside MRI examinations would be helpful.     Routine EEG 12/20/2019: Normal    Routine EEG 9/20/2023: Normal    Current seizure medications:  - diazepam PRN  - lamotrigine 25 mg BID  Other medications as per Epic.    Prior Seizure Medications: levetiracetam    Her history was also obtained from her mother, who was present at today's visit.      I reviewed prior neurology notes, most recent labs, EEG reports, MRI brain report, as documented in Epic/[facility], and summarized above.        Objective:    Blood pressure 128/84, height 5' 7\" (1.702 m), weight 102 kg (224 lb 6.4 oz).    Physical Exam  No apparent distress.   Appears well nourished.   Mood appropriate for situation     Neurologic Exam  Mental status- alert and oriented to person, place, and time. Speech appropriate for conversation. Good attention and knowledge.     Cranial Nerves- PERRL, EOMS normal, facial sensation and muscles symmetric, hearing intact bilaterally to finger rubs, tongue midline, palate rise symmetrical, shoulder shrug symmetrical.    Motor- No pronator drift. Appropriate strength. Moves all extremities freely. No tremor.    Sensory-  Intact distally in all extremities to light touch.     DTRs- 2+ and symmetric in all extremities.     Gait- normal casual gait.     Coordination- FNF intact.         ROS:  Review of Systems   Constitutional:  Negative for appetite change, fatigue and fever.   HENT: Negative.  Negative for hearing loss, tinnitus, trouble swallowing and voice change.    Eyes: Negative.  Negative for photophobia, pain and visual disturbance.   Respiratory: Negative.  Negative for shortness of breath.    Cardiovascular: Negative.  Negative for palpitations.   Gastrointestinal: Negative.  Negative for nausea and vomiting.   Endocrine: Negative.  Negative for cold " intolerance.   Genitourinary: Negative.  Negative for dysuria, frequency and urgency.   Musculoskeletal:  Negative for back pain, gait problem, myalgias and neck pain.   Skin: Negative.  Negative for rash.   Allergic/Immunologic: Negative.    Neurological: Negative.  Negative for dizziness, tremors, seizures, syncope, facial asymmetry, speech difficulty, weakness, light-headedness, numbness and headaches.   Hematological: Negative.  Does not bruise/bleed easily.   Psychiatric/Behavioral: Negative.  Negative for confusion, hallucinations and sleep disturbance.    All other systems reviewed and are negative.    ROS obtained by MA and reviewed by myself.       This note may have been created using voice recognition software. There may be unintentional errors such as grammatical errors, spelling errors, or pronoun errors.

## 2023-12-20 NOTE — PATIENT INSTRUCTIONS
- Start the titration schedule as below  - Have blood work one week after being on 150 mg twice per day  - Start taking folic acid 1 mg daily  - If you want to take birth control in the future, make sure you OBGYN is aware that you take lamotrigine  - Let us know if there are any issues or side effects, rashes  - Call the office with seizures issues or concerns  - See dermatology  - Follow up in 3 months       Lamotrigine Instructions  (# of 25 mg pills for AM and PM dose)     Week 1:           2 tabs AM / 2 tabs PM  Week 2:           3 tabs AM / 3 tabs PM  Week 3:           4 tabs AM / 4 tabs PM  Week 4:           5 tabs AM / 5 tabs PM  Week 5:           switch to 150 mg tablets - take 150 mg twice daily      Lamotrigine can cause a dangerous rash. Contact a physician immediately if you develop a rash.     Once on 150 mg twice day please get a level checked. Once the level is OK then we can start 150 mg tablets twice a day

## 2023-12-20 NOTE — ASSESSMENT & PLAN NOTE
Patient has remained free of events concerning for seizure since her last visit. Unfortunately she misunderstood the titration instructions for her lamotrigine and has only been taking lamotrigine 25 mg BID. She was curious if this dose could be continued as she is tolerating this well without issues but discussed that this would not be an adequate dose to prevent seizures. She is understanding of this.     We reviewed the titration schedule that was provided at her last visit as well as with her mother and they are understanding. Discussed importance of following titration due to risk of dangerous rash. If rash develops during titration, they will call the office. Given pill boxes so that titration can be easily followed as well. Goal dose of 150 mg BID and blood work will be checked to ensure adequate level.While her seizures are relatively infrequent, would like to ensure she is on adequate AED therapy to prevent recurrence. If events continue despite escalation of therapy or if events become more frequent, low threshold for EMU admission for characterization of events. Prior EEGs have been normal in 2019 and . MRI brain earlier this year with nonspecific foci of hyperintense signal on T2 and FLAIR imaging within the supratentorial white matter without associated enhancement, restricted diffusion, or susceptibility. Her neurologic exam is non-focal at today's visit.     She is not currently sexually active. No longer on birth control. Reviewed to ensure that her OBGYN is aware that she is taking lamotrigine if birth control is prescribed in the future as some OCPs may alter her lamotrigine levels (would likely require progesterone only formulation). Discussed that lamotrigine is considered a safe option for women of childbearing age with seizures. While there are no current plans for pregnancy, discussed importance of folic acid 1 mg daily, prescription sent. Home supply of valtoco is , mother  requested a new supply be sent to pharmacy.     Her neurologic exam is non-focal at today's visit. Incidentally noted on exam is a black lesion on the inside of her left hand. While this is reportedly a birthmark, it has changed recently, recommended dermatology evaluation to make sure this is monitored.     Patient or mother will call the office with further events concerning for seizures. Follow up in 3 months or sooner if needed with Dr Valencia.    Plan/patient instructions:  - Start the titration schedule as below  - Have blood work one week after being on 150 mg twice per day  - Start taking folic acid 1 mg daily  - If you want to take birth control in the future, make sure you OBGYN is aware that you take lamotrigine  - Let us know if there are any issues or side effects, rashes  - Call the office with seizures issues or concerns  - See dermatology  - Follow up in 3 months       Lamotrigine Instructions  (# of 25 mg pills for AM and PM dose)     Week 1:           2 tabs AM / 2 tabs PM  Week 2:           3 tabs AM / 3 tabs PM  Week 3:           4 tabs AM / 4 tabs PM  Week 4:           5 tabs AM / 5 tabs PM  Week 5:           switch to 150 mg tablets - take 150 mg twice daily      Lamotrigine can cause a dangerous rash. Contact a physician immediately if you develop a rash.     Once on 150 mg twice day please get a level checked. Once the level is OK then we can start 150 mg tablets twice a day

## 2024-01-21 DIAGNOSIS — R56.9 SEIZURE (HCC): ICD-10-CM

## 2024-01-21 RX ORDER — FOLIC ACID 1 MG/1
1 TABLET ORAL DAILY
Qty: 90 TABLET | Refills: 0 | Status: CANCELLED | OUTPATIENT
Start: 2024-01-21

## 2024-01-22 DIAGNOSIS — R56.9 SEIZURE (HCC): ICD-10-CM

## 2024-01-22 RX ORDER — FOLIC ACID 1 MG/1
1 TABLET ORAL DAILY
Qty: 90 TABLET | Refills: 3 | Status: SHIPPED | OUTPATIENT
Start: 2024-01-22

## 2024-01-22 RX ORDER — LAMOTRIGINE 150 MG/1
TABLET ORAL
Qty: 60 TABLET | Refills: 5 | Status: SHIPPED | OUTPATIENT
Start: 2024-01-22

## 2024-01-22 RX ORDER — IBUPROFEN 600 MG/1
600 TABLET ORAL EVERY 6 HOURS PRN
Qty: 30 TABLET | Refills: 0 | OUTPATIENT
Start: 2024-01-22

## 2024-02-03 ENCOUNTER — APPOINTMENT (OUTPATIENT)
Dept: LAB | Age: 29
End: 2024-02-03
Payer: MEDICARE

## 2024-02-03 DIAGNOSIS — R56.9 SEIZURE (HCC): ICD-10-CM

## 2024-02-03 LAB
ALBUMIN SERPL BCP-MCNC: 4.2 G/DL (ref 3.5–5)
ALP SERPL-CCNC: 61 U/L (ref 34–104)
ALT SERPL W P-5'-P-CCNC: 13 U/L (ref 7–52)
ANION GAP SERPL CALCULATED.3IONS-SCNC: 9 MMOL/L
AST SERPL W P-5'-P-CCNC: 16 U/L (ref 13–39)
BASOPHILS # BLD AUTO: 0.05 THOUSANDS/ÂΜL (ref 0–0.1)
BASOPHILS NFR BLD AUTO: 1 % (ref 0–1)
BILIRUB SERPL-MCNC: 0.21 MG/DL (ref 0.2–1)
BUN SERPL-MCNC: 11 MG/DL (ref 5–25)
CALCIUM SERPL-MCNC: 9.7 MG/DL (ref 8.4–10.2)
CHLORIDE SERPL-SCNC: 103 MMOL/L (ref 96–108)
CO2 SERPL-SCNC: 28 MMOL/L (ref 21–32)
CREAT SERPL-MCNC: 0.82 MG/DL (ref 0.6–1.3)
EOSINOPHIL # BLD AUTO: 0.12 THOUSAND/ÂΜL (ref 0–0.61)
EOSINOPHIL NFR BLD AUTO: 1 % (ref 0–6)
ERYTHROCYTE [DISTWIDTH] IN BLOOD BY AUTOMATED COUNT: 12.5 % (ref 11.6–15.1)
GFR SERPL CREATININE-BSD FRML MDRD: 98 ML/MIN/1.73SQ M
GLUCOSE P FAST SERPL-MCNC: 87 MG/DL (ref 65–99)
HCT VFR BLD AUTO: 40.9 % (ref 34.8–46.1)
HGB BLD-MCNC: 12.9 G/DL (ref 11.5–15.4)
IMM GRANULOCYTES # BLD AUTO: 0.03 THOUSAND/UL (ref 0–0.2)
IMM GRANULOCYTES NFR BLD AUTO: 0 % (ref 0–2)
LYMPHOCYTES # BLD AUTO: 3.12 THOUSANDS/ÂΜL (ref 0.6–4.47)
LYMPHOCYTES NFR BLD AUTO: 31 % (ref 14–44)
MCH RBC QN AUTO: 30.5 PG (ref 26.8–34.3)
MCHC RBC AUTO-ENTMCNC: 31.5 G/DL (ref 31.4–37.4)
MCV RBC AUTO: 97 FL (ref 82–98)
MONOCYTES # BLD AUTO: 0.68 THOUSAND/ÂΜL (ref 0.17–1.22)
MONOCYTES NFR BLD AUTO: 7 % (ref 4–12)
NEUTROPHILS # BLD AUTO: 6.21 THOUSANDS/ÂΜL (ref 1.85–7.62)
NEUTS SEG NFR BLD AUTO: 60 % (ref 43–75)
NRBC BLD AUTO-RTO: 0 /100 WBCS
PLATELET # BLD AUTO: 353 THOUSANDS/UL (ref 149–390)
PMV BLD AUTO: 10.7 FL (ref 8.9–12.7)
POTASSIUM SERPL-SCNC: 4.2 MMOL/L (ref 3.5–5.3)
PROT SERPL-MCNC: 7.3 G/DL (ref 6.4–8.4)
RBC # BLD AUTO: 4.23 MILLION/UL (ref 3.81–5.12)
SODIUM SERPL-SCNC: 140 MMOL/L (ref 135–147)
WBC # BLD AUTO: 10.21 THOUSAND/UL (ref 4.31–10.16)

## 2024-02-03 PROCEDURE — 80053 COMPREHEN METABOLIC PANEL: CPT

## 2024-02-03 PROCEDURE — 85025 COMPLETE CBC W/AUTO DIFF WBC: CPT

## 2024-02-03 PROCEDURE — 36415 COLL VENOUS BLD VENIPUNCTURE: CPT

## 2024-02-03 PROCEDURE — 80175 DRUG SCREEN QUAN LAMOTRIGINE: CPT

## 2024-02-07 LAB — LAMOTRIGINE SERPL-MCNC: 7.8 UG/ML (ref 2–20)

## 2024-02-21 PROBLEM — Z11.8 ENCOUNTER FOR SCREENING EXAMINATION FOR CHLAMYDIAL INFECTION: Status: RESOLVED | Noted: 2019-07-24 | Resolved: 2024-02-21

## 2024-02-25 DIAGNOSIS — R56.9 SEIZURE (HCC): ICD-10-CM

## 2024-02-26 RX ORDER — FOLIC ACID 1 MG/1
1 TABLET ORAL DAILY
Qty: 90 TABLET | Refills: 0 | Status: SHIPPED | OUTPATIENT
Start: 2024-02-26

## 2024-03-15 ENCOUNTER — TELEPHONE (OUTPATIENT)
Dept: NEUROLOGY | Facility: CLINIC | Age: 29
End: 2024-03-15

## 2024-04-22 ENCOUNTER — OFFICE VISIT (OUTPATIENT)
Dept: NEUROLOGY | Facility: CLINIC | Age: 29
End: 2024-04-22
Payer: COMMERCIAL

## 2024-04-22 VITALS
SYSTOLIC BLOOD PRESSURE: 101 MMHG | HEART RATE: 66 BPM | HEIGHT: 67 IN | DIASTOLIC BLOOD PRESSURE: 55 MMHG | BODY MASS INDEX: 33.18 KG/M2 | TEMPERATURE: 97.7 F | WEIGHT: 211.4 LBS

## 2024-04-22 DIAGNOSIS — R56.9 SEIZURE (HCC): ICD-10-CM

## 2024-04-22 DIAGNOSIS — R41.3 MEMORY CHANGES: Primary | ICD-10-CM

## 2024-04-22 PROCEDURE — 99215 OFFICE O/P EST HI 40 MIN: CPT | Performed by: STUDENT IN AN ORGANIZED HEALTH CARE EDUCATION/TRAINING PROGRAM

## 2024-04-22 RX ORDER — FOLIC ACID 1 MG/1
1 TABLET ORAL DAILY
Qty: 90 TABLET | Refills: 3 | Status: SHIPPED | OUTPATIENT
Start: 2024-04-22

## 2024-04-22 RX ORDER — LAMOTRIGINE 150 MG/1
TABLET ORAL
Qty: 180 TABLET | Refills: 2 | Status: SHIPPED | OUTPATIENT
Start: 2024-04-22

## 2024-04-22 NOTE — PROGRESS NOTES
[facility] -   Follow up appointment    Impression/Plan    Lilliam Leyva is a 28 y.o.  female with a PMH of paroxysmal spells presenting to the [facility] for follow up.  She also has a past medical history of dysmenorrhea, acne, keratosis pilaris, obesity, syncope, HSV-2, and dysuria. Her neurological exam is normal. She tolerated her increased dose of lamotrigine to 150 BID and has no concerns there. More recently, there is some question of a memory deficit. Since there isn't a temporal relationship with her lamotrigine, I don't think the medication is to blame. We will do some pseudo-dementia blood work. Plan outlined below:    #Seizure-like activity  - C/w Lamotrigine 150 BID  - LTG level pending  - C/w folic acid    #Memory concerns  - B12, TSH, Vit D, CMP, CBC    - Follow up in 6 months    We discussed the pathophysiology of epilepsy/seizure and seizure safety/precautions including driving restrictions following seizures. We discussed factors that can lower seizure threshold and the side effects of antiepileptic medications. Lastly, counseling about AEDs and contraception was addressed.    Diagnoses and all orders for this visit:    Memory changes  -     Vitamin B12; Future  -     CBC and differential; Future  -     Comprehensive metabolic panel; Future  -     Thyroid Panel With TSH; Future  -     Vitamin D 25 hydroxy; Future    Seizure (HCC)  -     Lamotrigine level; Future  -     lamoTRIgine (LaMICtal) 150 MG tablet; Once completed with 25 mg tablets, take one tab by mouth twice a day.  -     folic acid (KP Folic Acid) 1 mg tablet; Take 1 tablet (1 mg total) by mouth daily        Subjective    Lilliam Leyva is a 28 y.o.  female with a PMH of paroxysmal spells presenting to the [facility] for follow up.  She also has a past medical history of dysmenorrhea, acne, keratosis pilaris, obesity, syncope, HSV-2, and dysuria.       For review:      Her mother who has witnessed all of her episodes. She reports her first lifetime episode was when she was 20. She was with her mother in the park and she suddenly pitched forward onto concrete causing a scalp laceration and was noted to be shaking for about 10 minutes (full body) with eyes closed and deviated upward. She was not aware of anyone speaking to her during this episode and her first memory is waking up in the ED about 1-2 mins after she was administered a benzodiazepine and then she was somewhat off of her normal self for the next day.      Lilliam is aware an episode is coming on at this point and will tell her mother and then sit down and then her eyes roll back, she closes her eyes and experiences full body shaking for 10-15 minutes. On each occasion EMS was called and administered a benzodiazepine which terminated the event and she regained consciousness shortly after. She has never let an episode run its course.      She has previously undergone CTH and was normal. Labs at each hospitalization have been normal. She also underwent routine EEG in 2019 which was normal. She has never been started on AED prior to my first appointment. She has also been extensively worked up by cardiology for the possibility that these were syncopal events and workup including cardiac MRI was unremarkable.     She had an episode on 1/22/2022. She was watching a movie by herself and reported that she could feel an event coming on and sat down and the event progressed same as previous. At that time she presented to the ED at [facility] and was administered benzodiazepines and the episode resolved. She was told not to drive and reportedly [employer] form was filed on her behalf.     The patient established care with me on 2/11/2022. The etiology of her events was unclear at that point. The associated injury with one episode, loss of awareness and responsiveness to benzos was consistent with epilepsy. However, the duration of  events and eye closure with events is more consistent with PNES. I had a low suspicion that these were syncopal in nature. Out of an abundance of caution we started Keppra 500 BID and recommended a routine EEG and MRI brain. There was also a plan for ambulatory EEG thereafter.     She was seen in the office on 9/5/2023.  At that time it was noted that she remained free of episodes concerning for seizure despite not taking her Keppra until the summer when there was an unprovoked episode that occurred in a public place.  She was willing to start another AED at that time and complete previously ordered tests.  Recommended lamotrigine to goal dose of 150 mg twice per day, routine EEG, and MRI brain.  She was to follow-up in 3 months or sooner if needed.    She saw Lisa on 12/20/2023. Lamotrigine level was 1.1 when checked a little over a week prior.  She has not had further events concerning for seizures since her last visit. No longer taking levetiracetam. Taking lamotrigine 25 mg BID without side effects. Patient reported that she was taking 25 mg of lamotrigine twice per day and had not followed titration schedule.  Lisa discussed the importance of getting to goal dose of medication as current dose likely is not providing adequate protection from seizures. She was understanding of this. Plan at that time was to increase the lamotrigine to 150 BID.    Interval history:    Since last seen, the patient has been doing well. There have been no seizures since the last appointment. The patients AEDs were being tolerated well with no side effects. There is no concern for medication non-adherence. She tolerated her increased dose of lamotrigine 150 BID without issue    A few weeks ago, she noticed that her memory was a little worse than her baseline. She forgets things that are told to her. She sleeps 7-8 hours a night and denies depression. She is a vegetarian.     Current seizure medications:  - diazepam PRN  -  lamotrigine 150 mg BID  Other medications as per Epic.     Prior Seizure Medications: levetiracetam    Previous work up  MRI brain 9/27/2023:  IMPRESSION:  Nonspecific foci of hyperintense signal on T2 and FLAIR imaging within the supratentorial white matter including the anterior superior juxtacortical white matter bilaterally and within the periatrial juxtacortical white matter on the right. No associated enhancement, restricted diffusion or susceptibility. Differential considerations would include postinflammatory abnormality such as demyelinating disease or sequela of Lyme disease. These changes could be developmental. Direct comparison to any prior   outside MRI examinations would be helpful.     Routine EEG 12/20/2019: Normal     Routine EEG 9/20/2023: Normal     History Reviewed:   The following were reviewed and updated as appropriate: allergies, current medications, past family history, past medical history, past social history, past surgical history, and problem list    ROS:  Constitutional:  Negative for appetite change, fatigue and fever.   HENT: Negative.  Negative for hearing loss, tinnitus, trouble swallowing and voice change.    Eyes: Negative.  Negative for photophobia, pain and visual disturbance.   Respiratory: Negative.  Negative for shortness of breath.    Cardiovascular: Negative.  Negative for palpitations.   Gastrointestinal: Negative.  Negative for nausea and vomiting.   Endocrine: Negative.  Negative for cold intolerance.   Genitourinary: Negative.  Negative for dysuria, frequency and urgency.   Musculoskeletal:  Negative for back pain, gait problem, myalgias, neck pain and neck stiffness.   Skin: Negative.  Negative for rash.   Allergic/Immunologic: Negative.    Neurological: Negative.  Negative for dizziness, tremors, seizures, syncope, facial asymmetry, speech difficulty, weakness, light-headedness, numbness and headaches.   Hematological: Negative.  Does not bruise/bleed easily.  "  Psychiatric/Behavioral: Negative.  Negative for confusion, hallucinations and sleep disturbance.    All other systems reviewed and are negative.      Objective    /55 (BP Location: Left arm, Patient Position: Sitting, Cuff Size: Adult)   Pulse 66   Temp 97.7 °F (36.5 °C) (Temporal)   Ht 5' 7\" (1.702 m)   Wt 95.9 kg (211 lb 6.4 oz)   BMI 33.11 kg/m²      General Exam  General: well developed, no acute distress.  HEENT: mucous membranes moist, anicteric sclera.   Neck: supple, good ROM.  Extremities: no clubbing, cyanosis or edema.   Skin: no rash on visible skin.    Neurological Exam  Mental Status: awake, alert, and fully oriented to person, place, time, and situation. Attention and memory intact. Fund of knowledge is appropriate for age and education.  There is no neglect.    Language: fluency, and comprehension normal.       Cranial Nerves: Pupils equal and reactive to light.  Visual fields full to confrontation. Extraocular motions intact with full versions, normal pursuits and saccades. Facial strength full and symmetric. Hearing intact to voice. Tongue protrudes to midline. Palate elevates symmetrically. Speech clear without notable dysarthria.     Motor: Normal bulk and tone. No pronator drift. Strength is 5/5 proximally and distally in all 4 extremities. No involuntary movements.    Sensory: Sensation intact to light touch distally in all extremities.    Coordination: Normal finger-to-nose. Normal rapid alternating movements.     Station and gait: Casual and tandem gait normal. Normal Romberg.    Reflexes: Reflexes 2+ throughout and symmetric.      Haim Valencia MD   [facility]  Diplomate, ABPN Neurology and Clinical Neurophysiology         "

## 2024-04-22 NOTE — PATIENT INSTRUCTIONS
Continue with lamotrigine 150 mg twice a day    Blood work when possible for memory concerns and seizure medication    Follow up in 6 months

## 2024-04-24 ENCOUNTER — OFFICE VISIT (OUTPATIENT)
Dept: PODIATRY | Facility: CLINIC | Age: 29
End: 2024-04-24

## 2024-04-24 VITALS
DIASTOLIC BLOOD PRESSURE: 73 MMHG | SYSTOLIC BLOOD PRESSURE: 110 MMHG | HEIGHT: 67 IN | WEIGHT: 212 LBS | HEART RATE: 61 BPM | BODY MASS INDEX: 33.27 KG/M2

## 2024-04-24 DIAGNOSIS — L60.0 INGROWN NAIL OF GREAT TOE OF LEFT FOOT: Primary | ICD-10-CM

## 2024-04-24 PROCEDURE — 11750 EXCISION NAIL&NAIL MATRIX: CPT | Performed by: PODIATRIST

## 2024-04-24 PROCEDURE — 99202 OFFICE O/P NEW SF 15 MIN: CPT | Performed by: PODIATRIST

## 2024-04-24 NOTE — PROGRESS NOTES
Name: Lilliam Leyva      : 1995      MRN: [id number]  Encounter Provider: Kael Pugh DPM  Encounter Date: 2024   Encounter department: [facility] [place]    Assessment & Plan     1. Ingrown nail of great toe of left foot  -     Nail removal      Nail removal    Date/Time: 2024 9:00 AM    Performed by: Kael Pugh DPM  Authorized by: Kael Pugh DPM    Patient location:  Clinic  Indications / Diagnosis:  Ingrown nail  Universal Protocol:  Consent: Verbal consent obtained.  Risks and benefits: risks, benefits and alternatives were discussed  Consent given by: patient  Patient understanding: patient states understanding of the procedure being performed    Location:     Foot:  L big toe  Pre-procedure details:     Skin preparation:  Betadine    Preparation: Patient was prepped and draped in the usual sterile fashion    Anesthesia (see MAR for exact dosages):     Anesthesia method:  Nerve block    Block needle gauge:  25 G    Block anesthetic:  Lidocaine 1% w/o epi    Block technique:  Digital Block    Block outcome:  Anesthesia achieved  Nail Removal:     Nail removed:  Partial    Nail bed sutured: no    Ingrown nail:     Wedge excision of skin: no      Nail matrix removed or ablated:  Partial  Post-procedure details:     Dressing:  4x4 sterile gauze, antibiotic ointment, gauze roll and petrolatum-impregnated gauze    Patient tolerance of procedure:  Tolerated well, no immediate complications  Comments:      Discussion with patient was completed today regarding diagnosis and potential etiologies as well as treatment options for this ingrown nail diagnosis.  Discussed how to avoid recurrence and possible treatment options if recurrence does occur.    Antibiotic ointment applied to border with bandage dressing  Pt instructed to keep dressing intact for 24 hours.  After this time use triple antibiotic ointment to the area and a dry dressing changed  daily  Return to clinic in about 3 weeks for reevaluation.  If notice any redness, swelling, drainage, or excessive pain or signs of infection to notify the office sooner.  Procedure completed without incident.  Do not soak foot.    Procedure in detail: Once the toe was anesthetized, the area was scrubbed and prepped with sterile technique.  A Tourni-Cot was used to the level on the toe.  A hemostat was used to lift up the involved border of the great toe.  Care was taken to protect the underlying nail bed.  An English anvil was used to cut back corner to the base of the nail.  A hemostat was then used to remove the nail that was ingrown.  This was then checked that the entire ingrown portion was removed. A currette was used to remove the nail matrix from the base of the nail at the proximal corner, three applications of 90% phenol were applied to the border with cotton swabs with alcohol wash inbetween.   Hemostasis was achieved and dressings were applied.            Return in about 3 weeks (around 5/15/2024).    Subjective     Ingrown nail left great toe medial border. Had same issue with right great toe medial border. Had this removed and chemical placed in the nail and didn't have any other issues with it.  No other new acute issues.  Patient denies any trauma to the area.  She is try to cut back on her own however continues to cause her pain and discomfort.      Review of Systems   Constitutional:  Negative for chills and fever.   Respiratory:  Negative for chest tightness and shortness of breath.    Gastrointestinal:  Negative for nausea and vomiting.       Current Outpatient Medications on File Prior to Visit   Medication Sig   • adapalene (Differin) 0.1 % cream APPLY TOPICALLY DAILY AT BEDTIME   • benzoyl peroxide 5 % external liquid Apply topically 2 (two) times a day Apply topically 2 (two) times per day   • diazePAM, 20 MG Dose, (Valtoco 20 MG Dose) 2 x 10 MG/0.1ML LQPK 10 mg into each nostril as needed  "(for seizure longer than 5 minutes)   • FLUoxetine (PROzac) 20 mg capsule Take 2 capsules (40 mg total) by mouth daily   • folic acid (KP Folic Acid) 1 mg tablet Take 1 tablet (1 mg total) by mouth daily   • hydrOXYzine pamoate (VISTARIL) 25 mg capsule Take 1 capsule (25 mg total) by mouth 3 (three) times a day as needed for anxiety   • ibuprofen (MOTRIN) 600 mg tablet Take 1 tablet (600 mg total) by mouth every 6 (six) hours as needed for moderate pain   • lamoTRIgine (LaMICtal) 150 MG tablet Once completed with 25 mg tablets, take one tab by mouth twice a day.       Objective     /73   Pulse 61   Ht 5' 7\" (1.702 m)   Wt 96.2 kg (212 lb)   BMI 33.20 kg/m²     Physical Exam  Constitutional:       General: She is not in acute distress.     Appearance: Normal appearance.   Musculoskeletal:         General: Tenderness present.   Feet:      Comments: Pain on palpation noted to the left great toe medial nail border.  There is discomfort with medial lateral squeeze at the level of the great toe.  Intact pedal pulses.  Neurological sensation is grossly intact distally.  Neurological:      Mental Status: She is alert and oriented to person, place, and time.   Psychiatric:         Mood and Affect: Mood normal.         Judgment: Judgment normal.       "

## 2024-06-21 ENCOUNTER — APPOINTMENT (OUTPATIENT)
Age: 29
End: 2024-06-21
Payer: COMMERCIAL

## 2024-06-21 DIAGNOSIS — R56.9 SEIZURE (HCC): ICD-10-CM

## 2024-06-21 DIAGNOSIS — R41.3 MEMORY CHANGES: ICD-10-CM

## 2024-06-21 LAB
25(OH)D3 SERPL-MCNC: 15.8 NG/ML (ref 30–100)
ALBUMIN SERPL BCG-MCNC: 4.3 G/DL (ref 3.5–5)
ALP SERPL-CCNC: 64 U/L (ref 34–104)
ALT SERPL W P-5'-P-CCNC: 9 U/L (ref 7–52)
ANION GAP SERPL CALCULATED.3IONS-SCNC: 12 MMOL/L (ref 4–13)
AST SERPL W P-5'-P-CCNC: 14 U/L (ref 13–39)
BASOPHILS # BLD AUTO: 0.06 THOUSANDS/ÂΜL (ref 0–0.1)
BASOPHILS NFR BLD AUTO: 1 % (ref 0–1)
BILIRUB SERPL-MCNC: 0.24 MG/DL (ref 0.2–1)
BUN SERPL-MCNC: 13 MG/DL (ref 5–25)
CALCIUM SERPL-MCNC: 9.6 MG/DL (ref 8.4–10.2)
CHLORIDE SERPL-SCNC: 103 MMOL/L (ref 96–108)
CO2 SERPL-SCNC: 26 MMOL/L (ref 21–32)
CREAT SERPL-MCNC: 0.86 MG/DL (ref 0.6–1.3)
EOSINOPHIL # BLD AUTO: 0.11 THOUSAND/ÂΜL (ref 0–0.61)
EOSINOPHIL NFR BLD AUTO: 1 % (ref 0–6)
ERYTHROCYTE [DISTWIDTH] IN BLOOD BY AUTOMATED COUNT: 12.9 % (ref 11.6–15.1)
GFR SERPL CREATININE-BSD FRML MDRD: 92 ML/MIN/1.73SQ M
GLUCOSE P FAST SERPL-MCNC: 86 MG/DL (ref 65–99)
HCT VFR BLD AUTO: 39.5 % (ref 34.8–46.1)
HGB BLD-MCNC: 12.7 G/DL (ref 11.5–15.4)
IMM GRANULOCYTES # BLD AUTO: 0.05 THOUSAND/UL (ref 0–0.2)
IMM GRANULOCYTES NFR BLD AUTO: 1 % (ref 0–2)
LYMPHOCYTES # BLD AUTO: 2.93 THOUSANDS/ÂΜL (ref 0.6–4.47)
LYMPHOCYTES NFR BLD AUTO: 27 % (ref 14–44)
MCH RBC QN AUTO: 30.9 PG (ref 26.8–34.3)
MCHC RBC AUTO-ENTMCNC: 32.2 G/DL (ref 31.4–37.4)
MCV RBC AUTO: 96 FL (ref 82–98)
MONOCYTES # BLD AUTO: 0.86 THOUSAND/ÂΜL (ref 0.17–1.22)
MONOCYTES NFR BLD AUTO: 8 % (ref 4–12)
NEUTROPHILS # BLD AUTO: 6.99 THOUSANDS/ÂΜL (ref 1.85–7.62)
NEUTS SEG NFR BLD AUTO: 62 % (ref 43–75)
NRBC BLD AUTO-RTO: 0 /100 WBCS
PLATELET # BLD AUTO: 322 THOUSANDS/UL (ref 149–390)
PMV BLD AUTO: 10.8 FL (ref 8.9–12.7)
POTASSIUM SERPL-SCNC: 4 MMOL/L (ref 3.5–5.3)
PROT SERPL-MCNC: 7.2 G/DL (ref 6.4–8.4)
RBC # BLD AUTO: 4.11 MILLION/UL (ref 3.81–5.12)
SODIUM SERPL-SCNC: 141 MMOL/L (ref 135–147)
T4 FREE SERPL-MCNC: 0.76 NG/DL (ref 0.61–1.12)
T4 SERPL-MCNC: 8.82 UG/DL (ref 6.09–12.23)
TSH SERPL DL<=0.05 MIU/L-ACNC: 1.2 UIU/ML (ref 0.45–4.5)
VIT B12 SERPL-MCNC: 362 PG/ML (ref 180–914)
WBC # BLD AUTO: 11 THOUSAND/UL (ref 4.31–10.16)

## 2024-06-21 PROCEDURE — 84443 ASSAY THYROID STIM HORMONE: CPT

## 2024-06-21 PROCEDURE — 36415 COLL VENOUS BLD VENIPUNCTURE: CPT

## 2024-06-21 PROCEDURE — 80053 COMPREHEN METABOLIC PANEL: CPT

## 2024-06-21 PROCEDURE — 84439 ASSAY OF FREE THYROXINE: CPT

## 2024-06-21 PROCEDURE — 84479 ASSAY OF THYROID (T3 OR T4): CPT

## 2024-06-21 PROCEDURE — 85025 COMPLETE CBC W/AUTO DIFF WBC: CPT

## 2024-06-21 PROCEDURE — 82607 VITAMIN B-12: CPT

## 2024-06-21 PROCEDURE — 80175 DRUG SCREEN QUAN LAMOTRIGINE: CPT

## 2024-06-21 PROCEDURE — 82306 VITAMIN D 25 HYDROXY: CPT

## 2024-06-21 PROCEDURE — 84436 ASSAY OF TOTAL THYROXINE: CPT

## 2024-06-22 LAB — T3RU NFR SERPL: 27 % (ref 24–39)

## 2024-06-24 LAB — LAMOTRIGINE SERPL-MCNC: 6.4 UG/ML (ref 2–20)

## 2024-07-20 ENCOUNTER — TELEPHONE (OUTPATIENT)
Dept: OTHER | Facility: OTHER | Age: 29
End: 2024-07-20

## 2024-07-20 NOTE — TELEPHONE ENCOUNTER
Pt reached the after hours service, she declined speaking to a triage nurse but would like to schedule an appt asap to be seen for a Gyn concern and she wants to see her pcp. Could you please reach out to the pt to help schedule.

## 2024-07-24 ENCOUNTER — OFFICE VISIT (OUTPATIENT)
Dept: FAMILY MEDICINE CLINIC | Facility: CLINIC | Age: 29
End: 2024-07-24

## 2024-07-24 VITALS
TEMPERATURE: 97.2 F | RESPIRATION RATE: 18 BRPM | HEIGHT: 67 IN | WEIGHT: 212.8 LBS | OXYGEN SATURATION: 100 % | BODY MASS INDEX: 33.4 KG/M2 | DIASTOLIC BLOOD PRESSURE: 60 MMHG | HEART RATE: 60 BPM | SYSTOLIC BLOOD PRESSURE: 98 MMHG

## 2024-07-24 DIAGNOSIS — L70.0 ACNE VULGARIS: ICD-10-CM

## 2024-07-24 DIAGNOSIS — L73.1 INGROWN HAIR: Primary | ICD-10-CM

## 2024-07-24 PROBLEM — R35.0 INCREASED URINARY FREQUENCY: Status: RESOLVED | Noted: 2021-08-13 | Resolved: 2024-07-24

## 2024-07-24 PROBLEM — R55 SYNCOPE: Status: RESOLVED | Noted: 2019-12-10 | Resolved: 2024-07-24

## 2024-07-24 PROBLEM — Z31.9 DESIRE FOR PREGNANCY: Status: RESOLVED | Noted: 2020-03-10 | Resolved: 2024-07-24

## 2024-07-24 PROBLEM — Z00.00 ANNUAL PHYSICAL EXAM: Status: RESOLVED | Noted: 2023-02-22 | Resolved: 2024-07-24

## 2024-07-24 PROBLEM — N89.8 VAGINAL DISCHARGE: Status: RESOLVED | Noted: 2020-06-24 | Resolved: 2024-07-24

## 2024-07-24 PROCEDURE — 99213 OFFICE O/P EST LOW 20 MIN: CPT | Performed by: FAMILY MEDICINE

## 2024-07-24 NOTE — ASSESSMENT & PLAN NOTE
Pt most likely has ingrown hair. No signs of infection in the area.     Advised against shaving in the area   Monitor for signs of infection in the area  Advised the use of warm compresses and massaging the area  Advised to RTO if any worsening symptoms

## 2024-07-24 NOTE — PROGRESS NOTES
[facility] [place]  Outpatient Visit - LOGAN: 24     Patient's Information      Name: Lilliam Leyva  Age/Sex: 28 y.o. female  MRN: [id number]  : 1995      Assessment/Plan     A/P: Lilliam Leyva is a 28 y.o. female patient that came to the clinic today for a lump on her vulvar area.   Chart reviewed. Plan below.     Ingrown hair    Pt most likely has ingrown hair. Mild induration and tenderness to palpation noted in the left anterior upper aspect of the vulva. No signs of infection in the area. No crepitus or fluctuation palpated in the area. Low suspicions for STD. Pt not currently sexually active.     Advised against shaving in the area   Monitor for signs of infection in the area  Advised the use of warm compresses and massaging the area  Advised to RTO if any worsening symptoms or if not resolving w/I 1-2 weeks    Associated orders were discussed and explained to the pt. Pertinent care gaps were addressed.   Pt voiced understanding and acceptance with A/P. Pt will call the office if any further questions/concerns.     Next Visit: Return if symptoms worsen or fail to improve and for annual physical.    A/P of patient's case was discussed with the Attending, Dr. Zully Law.    Subjective     History of Present Illness      Chief Complaint   Patient presents with    Cyst     Pt stated she has a cyst on the left side of her vagina, painful      28 y.o. female patient came to the clinic for complaints of a painful lump on the left anterior aspect of her vulva.  Patient refers that she has had this lump there for the past week. She has used warm compresses and a OTC cream for boils with no major changes.  Patient states that she is not active sexually. She has not been sexually active for the past 3 years. Has no concerns for STDs at the moment. She refers that this is the first time that she feels this type of a lump in her vulvar area.  "She denies any popping sensation or noticing any suppuration or discharge from the area. She mentions that the area does feel tender. She also shaved recently. Denies any recents fevers or any other skin changes.     I reviewed patient's hx and updated as appropriate if needed: allergies, current medications, PMHx, FHx, social hx, surgical hx, and problem list.      Objective     Vital Signs     Visit Vitals  BP 98/60 (BP Location: Right arm, Patient Position: Sitting, Cuff Size: Standard)   Pulse 60   Temp (!) 97.2 °F (36.2 °C) (Temporal)   Resp 18   Ht 5' 7\" (1.702 m)   Wt 96.5 kg (212 lb 12.8 oz)   SpO2 100%   BMI 33.33 kg/m²   OB Status Unknown   Smoking Status Never   BSA 2.08 m²      Physical Exam      Physical Exam  Vitals and nursing note reviewed.   Constitutional:       General: She is not in acute distress.     Appearance: Normal appearance. She is obese. She is not ill-appearing or toxic-appearing.   HENT:      Head: Normocephalic and atraumatic.      Right Ear: External ear normal.      Left Ear: External ear normal.      Nose: Nose normal.      Mouth/Throat:      Mouth: Mucous membranes are moist.   Eyes:      General: No scleral icterus.     Conjunctiva/sclera: Conjunctivae normal.   Cardiovascular:      Rate and Rhythm: Normal rate and regular rhythm.      Pulses: Normal pulses.      Heart sounds: Normal heart sounds.   Pulmonary:      Effort: Pulmonary effort is normal. No respiratory distress.      Breath sounds: Normal breath sounds. No wheezing.   Abdominal:      General: There is no distension.      Palpations: Abdomen is soft.      Tenderness: There is no abdominal tenderness.   Musculoskeletal:         General: Normal range of motion.      Cervical back: Normal range of motion.      Right lower leg: No edema.      Left lower leg: No edema.   Skin:     General: Skin is warm and dry.      Findings: No erythema, lesion, rash or wound. Rash is not papular, pustular or vesicular.        " "  Neurological:      Mental Status: She is alert and oriented to person, place, and time.   Psychiatric:         Mood and Affect: Mood normal.         Behavior: Behavior normal.         Thought Content: Thought content normal.         Judgment: Judgment normal.            It was a pleasure being of service to Lilliam Leyva. Thank you.     Mercedes Pack MD., [facility]   [facility] Fellow - PGY4     07/24/24   3:26 PM          Portions of the record may have been created with voice recognition software. Occasional wrong word or \"sound a like\" substitutions may have occurred due to the inherent limitations of voice recognition software. Read the chart carefully and recognize, using context, where substitutions have occurred.   "

## 2024-07-25 RX ORDER — BENZOYL PEROXIDE 50 MG/ML
LIQUID TOPICAL 2 TIMES DAILY
Qty: 148 ML | Refills: 1 | Status: SHIPPED | OUTPATIENT
Start: 2024-07-25

## 2024-08-06 ENCOUNTER — TELEPHONE (OUTPATIENT)
Age: 29
End: 2024-08-06

## 2024-08-06 NOTE — LETTER
August 8, 2024     Lilliam Leyva    Patient: Lilliam Leyva   YOB: 1995       To Whom It May Concern:      Lilliam Leyva follows with [facility] for her seizures. It would benefit the patient to have a certified service dog to help with her seizure condition. Please feel free to contact the office if further questions or concerns.      Sincerely,    Haim Valencia MD

## 2024-08-06 NOTE — TELEPHONE ENCOUNTER
Patient calling to request a letter from Dr. Valencia stating she can have a service dog for her seizures. Patient states she is mainly alone due to mom being out of town and her residence requires a doctor's note to approve her puppy. Patient says it can be uploaded via my chart or she can pick it up at the office.

## 2024-08-10 ENCOUNTER — OFFICE VISIT (OUTPATIENT)
Age: 29
End: 2024-08-10
Payer: COMMERCIAL

## 2024-08-10 VITALS
RESPIRATION RATE: 18 BRPM | HEIGHT: 67 IN | HEART RATE: 85 BPM | OXYGEN SATURATION: 99 % | WEIGHT: 205 LBS | BODY MASS INDEX: 32.18 KG/M2 | DIASTOLIC BLOOD PRESSURE: 57 MMHG | SYSTOLIC BLOOD PRESSURE: 108 MMHG | TEMPERATURE: 97.7 F

## 2024-08-10 DIAGNOSIS — R19.09 MASS OF INGUINAL REGION: Primary | ICD-10-CM

## 2024-08-10 PROCEDURE — 99213 OFFICE O/P EST LOW 20 MIN: CPT | Performed by: PHYSICIAN ASSISTANT

## 2024-08-10 NOTE — PROGRESS NOTES
[facility]        NAME: Lilliam Leyva is a 28 y.o. female  : 1995    MRN: [id number]  DATE: August 10, 2024  TIME: 1:46 PM    Assessment and Plan   Mass of inguinal region [R19.09]  1. Mass of inguinal region  Transfer to other facility          Discussed with the patient and her relative that this based on palpation and her history most likely a cyst although the etiology is not definitive do not suspect an abscess based on the lack of fluctuance although cannot rule out an infected cyst either.  Based on this, would not recommend any intervention here in urgent care would recommend evaluation in the ED where they have the option of imaging if they feel it is indicated and could perform I&D or aspiration if that were indicated as well.    The patient and/or parent/legal guardian verbalized understanding of exam findings and   Treatment plan. We engaged in the shared decision-making process and treatment options were   discussed at length with the patient.  All questions, concerns and complaints were answered and   addressed to the patient's' and/or parent/legal guardians's satisfaction.    Patient Instructions   There are no Patient Instructions on file for this visit.    Follow up with PCP in 3-5 days.  Proceed to  ER if symptoms worsen.    If tests are performed, our office will contact you with results only if   changes need to made to the care plan discussed with you at the visit.   You can review your full results on [facility].     Chief Complaint     Chief Complaint   Patient presents with   • Abscess     Started 2 weeks ago, patient complains of pump of genital area, hard to the touch, painful.          History of Present Illness       HPI  PT reports about 2 weeks she developed a bump in the left inguinal region. Red and firm, painful. Not itchy. No salas. Not around a hair follicle. Denies any urinary issues or vaginal discharge. This feels different than the issue she had 7  years ago which was treated as a viral outbreak by [facility] ED. Any fever or chills.     Review of Systems   Review of Systems  All other related systems reviewed and are negative except as noted in HPI    Current Medications       Current Outpatient Medications:   •  benzoyl peroxide 5 % external wash, APPLY TOPICALLY 2 (TWO) TIMES A DAY APPLY TOPICALLY 2 (TWO) TIMES PER DAY, Disp: 148 mL, Rfl: 1  •  folic acid ( Folic Acid) 1 mg tablet, Take 1 tablet (1 mg total) by mouth daily, Disp: 90 tablet, Rfl: 3  •  lamoTRIgine (LaMICtal) 150 MG tablet, Once completed with 25 mg tablets, take one tab by mouth twice a day., Disp: 180 tablet, Rfl: 2  •  adapalene (Differin) 0.1 % cream, APPLY TOPICALLY DAILY AT BEDTIME (Patient not taking: Reported on 7/24/2024), Disp: 45 g, Rfl: 0  •  diazePAM, 20 MG Dose, (Valtoco 20 MG Dose) 2 x 10 MG/0.1ML LQPK, 10 mg into each nostril as needed (for seizure longer than 5 minutes) (Patient not taking: Reported on 7/24/2024), Disp: 1 each, Rfl: 0  •  FLUoxetine (PROzac) 20 mg capsule, Take 2 capsules (40 mg total) by mouth daily (Patient not taking: Reported on 7/24/2024), Disp: 180 capsule, Rfl: 1  •  hydrOXYzine pamoate (VISTARIL) 25 mg capsule, Take 1 capsule (25 mg total) by mouth 3 (three) times a day as needed for anxiety (Patient not taking: Reported on 7/24/2024), Disp: 90 capsule, Rfl: 1  •  ibuprofen (MOTRIN) 600 mg tablet, Take 1 tablet (600 mg total) by mouth every 6 (six) hours as needed for moderate pain (Patient not taking: Reported on 7/24/2024), Disp: 30 tablet, Rfl: 0    Current Allergies     Allergies as of 08/10/2024   • (No Known Allergies)            The following portions of the patient's history were reviewed and updated as appropriate: allergies, current medications, past family history, past medical history, past social history, past surgical history and problem list.     Past Medical History:   Diagnosis Date   • Chest pain    • Menstrual cramps 11/14/2018   •  "Nephrolithiasis 07/12/2018   • Palpitations    • Pelvic pain 04/30/2015   • Seizure (HCC) 02/11/2022   • Syncope        No past surgical history on file.    Family History   Problem Relation Age of Onset   • No Known Problems Mother    • Diabetes Father    • Other Father         tumor    • Diabetes Maternal Grandfather    • Diabetes Paternal Grandfather    • Cancer Paternal Aunt          Medications have been verified.        Objective   /57   Pulse 85   Temp 97.7 °F (36.5 °C)   Resp 18   Ht 5' 7\" (1.702 m)   Wt 93 kg (205 lb)   SpO2 99%   BMI 32.11 kg/m²   No LMP recorded.       Physical Exam     Physical Exam  Constitutional:       General: She is not in acute distress.     Appearance: She is well-developed.   HENT:      Head: Normocephalic and atraumatic.   Eyes:      General: No scleral icterus.     Conjunctiva/sclera: Conjunctivae normal.   Neck:      Trachea: No tracheal deviation.   Pulmonary:      Effort: Pulmonary effort is normal. No respiratory distress.      Breath sounds: No stridor.   Genitourinary:     Comments: Maria Del Rosario Valdez MA present for entire exam.  In the suprapubic region there is slightly left of the midline a firm subcutaneous mass approximately 3 cm wide there is no palpable fluctuance no significant warmth as compared to the surrounding skin.  There is no visible infected hair follicle.  Musculoskeletal:      Cervical back: Normal range of motion.   Skin:     General: Skin is warm and dry.      Findings: No erythema.   Neurological:      Mental Status: She is alert and oriented to person, place, and time.   Psychiatric:         Behavior: Behavior normal.         Ortho Exam        Procedures  No Procedures performed today        Note: Portions of this record may have been created with voice recognition software. Occasional wrong word or \"sound a like\" substitutions may have occurred due to the inherent limitations of voice recognition software. Please read the chart " carefully and recognize, using context, where substitutions have occurred.*

## 2024-08-30 ENCOUNTER — HOSPITAL ENCOUNTER (EMERGENCY)
Facility: HOSPITAL | Age: 29
Discharge: HOME | End: 2024-08-30
Attending: EMERGENCY MEDICINE | Admitting: EMERGENCY MEDICINE
Payer: COMMERCIAL

## 2024-08-30 VITALS
RESPIRATION RATE: 18 BRPM | SYSTOLIC BLOOD PRESSURE: 140 MMHG | HEART RATE: 87 BPM | WEIGHT: 210 LBS | TEMPERATURE: 98 F | DIASTOLIC BLOOD PRESSURE: 70 MMHG | BODY MASS INDEX: 32.89 KG/M2 | OXYGEN SATURATION: 99 %

## 2024-08-30 DIAGNOSIS — L02.91 ABSCESS: Primary | ICD-10-CM

## 2024-08-30 PROCEDURE — 99282 EMERGENCY DEPT VISIT SF MDM: CPT

## 2024-08-30 RX ORDER — DOXYCYCLINE 100 MG/1
100 CAPSULE ORAL 2 TIMES DAILY
Qty: 14 CAPSULE | Refills: 0 | Status: SHIPPED | OUTPATIENT
Start: 2024-08-30 | End: 2024-09-06

## 2024-08-30 NOTE — DISCHARGE INSTRUCTIONS
A  personal message from Dr. Berny Faulkner,  Thank you so much for allowing me to care for you today.    I pride myself in the care and attention I give all my patients.  I hope you were a witness to this tonight.   If for any reason your condition does not improve or worsens, or you have a question that was not answered during your visit you can feel free to text me on my personal phone #  # [phone].   I will answer to your message and continue your care past your emergency room visit.     Please understand that although you are being discharged because your condition has been deemed stable and able to be managed on an outpatient setting. However your condition may worsen as part of the natural progression of the illness/condition, if this occurs please come back to the emergency department for a repeat evaluation.

## 2024-08-31 NOTE — ED PROVIDER NOTES
History  Chief Complaint   Patient presents with    Abscess     Abscess on R labia x 4 days,        Lilliam Leyva is a 28 y.o.  year old female  Past Medical History:  No date: Chest pain  11/14/2018: Menstrual cramps  07/12/2018: Nephrolithiasis  No date: Palpitations  04/30/2015: Pelvic pain  02/11/2022: Seizure (HCC)  No date: Syncope  Social History    Tobacco Use      Smoking status: Never      Smokeless tobacco: Never    Vaping Use      Vaping status: Never Used    Alcohol use: No    Drug use: No    Patient presents with:  Abscess: Abscess on R labia x 4 days,   Patient with tender lesion on the suprapubic region.  No fever chills nausea or vomiting.  She went to family practice and they told her she needed a procedure done so they sent her to the nearest emergency department for evaluation and treatment.    History obtained directly from the PATIENT              History provided by:  Patient   used: No    Abscess  Associated symptoms: no fever and no vomiting        Prior to Admission Medications   Prescriptions Last Dose Informant Patient Reported? Taking?   FLUoxetine (PROzac) 20 mg capsule  Self No No   Sig: Take 2 capsules (40 mg total) by mouth daily   Patient not taking: Reported on 7/24/2024   adapalene (Differin) 0.1 % cream  Self No No   Sig: APPLY TOPICALLY DAILY AT BEDTIME   Patient not taking: Reported on 7/24/2024   benzoyl peroxide 5 % external wash   No No   Sig: APPLY TOPICALLY 2 (TWO) TIMES A DAY APPLY TOPICALLY 2 (TWO) TIMES PER DAY   diazePAM, 20 MG Dose, (Valtoco 20 MG Dose) 2 x 10 MG/0.1ML LQPK  Self No No   Sig: 10 mg into each nostril as needed (for seizure longer than 5 minutes)   Patient not taking: Reported on 7/24/2024   folic acid (KP Folic Acid) 1 mg tablet   No No   Sig: Take 1 tablet (1 mg total) by mouth daily   hydrOXYzine pamoate (VISTARIL) 25 mg capsule  Self No No   Sig: Take 1 capsule (25 mg total) by mouth 3 (three) times a day as needed for anxiety    Patient not taking: Reported on 7/24/2024   ibuprofen (MOTRIN) 600 mg tablet  Self No No   Sig: Take 1 tablet (600 mg total) by mouth every 6 (six) hours as needed for moderate pain   Patient not taking: Reported on 7/24/2024   lamoTRIgine (LaMICtal) 150 MG tablet   No No   Sig: Once completed with 25 mg tablets, take one tab by mouth twice a day.      Facility-Administered Medications: None       Past Medical History:   Diagnosis Date    Chest pain     Menstrual cramps 11/14/2018    Nephrolithiasis 07/12/2018    Palpitations     Pelvic pain 04/30/2015    Seizure (HCC) 02/11/2022    Syncope        No past surgical history on file.    Family History   Problem Relation Age of Onset    No Known Problems Mother     Diabetes Father     Other Father         tumor     Diabetes Maternal Grandfather     Diabetes Paternal Grandfather     Cancer Paternal Aunt      I have reviewed and agree with the history as documented.    E-Cigarette/Vaping    E-Cigarette Use Never User      E-Cigarette/Vaping Substances    Nicotine No     THC No     CBD No     Flavoring No     Other No     Unknown No      Social History     Tobacco Use    Smoking status: Never    Smokeless tobacco: Never   Vaping Use    Vaping status: Never Used   Substance Use Topics    Alcohol use: No    Drug use: No       Review of Systems   Constitutional:  Negative for chills and fever.   HENT:  Negative for ear pain and sore throat.    Eyes:  Negative for pain and visual disturbance.   Respiratory:  Negative for cough and shortness of breath.    Cardiovascular:  Negative for chest pain and palpitations.   Gastrointestinal:  Negative for abdominal pain and vomiting.   Genitourinary:  Negative for dysuria and hematuria.   Musculoskeletal:  Negative for arthralgias and back pain.   Skin:  Positive for wound. Negative for color change and rash.   Neurological:  Negative for seizures and syncope.   All other systems reviewed and are negative.      Physical Exam  Physical  Exam  Vitals reviewed.   Constitutional:       Appearance: Normal appearance.   HENT:      Head: Normocephalic.      Right Ear: External ear normal.      Left Ear: External ear normal.      Mouth/Throat:      Mouth: Mucous membranes are moist.   Eyes:      Pupils: Pupils are equal, round, and reactive to light.   Cardiovascular:      Rate and Rhythm: Normal rate.   Pulmonary:      Effort: Pulmonary effort is normal.   Skin:     Capillary Refill: Capillary refill takes less than 2 seconds.      Comments: Patient with a very small fluctuance abscess just in top of the genitalia but really in the skin area.  Patient obviously shaves the area and this is most likely a result of ingrown hair.    Procedure note: Performed by me incision and drainage.  Local infiltration with lido with epi was done approximately 1 to 2 cc.  After which an #11 blade was used to puncture and drain the abscess 1 or 2 cc of pus was obtained and expressed from the wound.  Tolerated well no complications   Neurological:      General: No focal deficit present.      Mental Status: She is alert and oriented to person, place, and time.   Psychiatric:         Mood and Affect: Mood normal.         Thought Content: Thought content normal.         Vital Signs  ED Triage Vitals [08/30/24 1528]   Temperature Pulse Respirations Blood Pressure SpO2   98 °F (36.7 °C) 87 18 140/70 99 %      Temp src Heart Rate Source Patient Position - Orthostatic VS BP Location FiO2 (%)   -- -- -- -- --      Pain Score       --           Vitals:    08/30/24 1528   BP: 140/70   Pulse: 87         Visual Acuity      ED Medications  Medications - No data to display    Diagnostic Studies  Results Reviewed       None                   No orders to display              Procedures  Procedures         ED Course                                 SBIRT 22yo+      Flowsheet Row Most Recent Value   Initial Alcohol Screen: US AUDIT-C     1. How often do you have a drink containing alcohol?  0 Filed at: 08/30/2024 1549   2. How many drinks containing alcohol do you have on a typical day you are drinking?  0 Filed at: 08/30/2024 1549   3b. FEMALE Any Age, or MALE 65+: How often do you have 4 or more drinks on one occassion? 0 Filed at: 08/30/2024 1549   Audit-C Score 0 Filed at: 08/30/2024 1549   FREDI: How many times in the past year have you...    Used an illegal drug or used a prescription medication for non-medical reasons? Never Filed at: 08/30/2024 1549                      Medical Decision Making  Problems Addressed:  Abscess: acute illness or injury    Amount and/or Complexity of Data Reviewed  Discussion of management or test interpretation with external provider(s): Patient clinical presentation is benign.    Meaning patient's vital signs are normal and stable ED Triage Vitals [08/30/24 1528]  Temperature: 98 °F (36.7 °C)  Pulse: 87  Respirations: 18  Blood Pressure: 140/70  SpO2: 99 %  Temp src: n/a  Heart Rate Source: n/a  Patient Position - Orthostatic VS: n/a  BP Location: n/a  FiO2 (%): n/a  Pain Score: n/a.    Patient in no distress.    Chief complaint, vital signs, physical examination does not suggest an acute medical emergency at this time.       Risk  OTC drugs.  Prescription drug management.                 Disposition  Final diagnoses:   Abscess     Time reflects when diagnosis was documented in both MDM as applicable and the Disposition within this note       Time User Action Codes Description Comment    8/30/2024  3:50 PM Berny Faulkner Add [L02.91] Abscess           ED Disposition       ED Disposition   Discharge    Condition   Stable    Date/Time   Fri Aug 30, 2024 1550    Comment   Lilliam Leyva discharge to home/self care.                   Follow-up Information    None         Discharge Medication List as of 8/30/2024  3:51 PM        START taking these medications    Details   doxycycline hyclate (VIBRAMYCIN) 100 mg capsule Take 1 capsule (100 mg total) by mouth 2 (two) times a  day for 7 days, Starting Fri 8/30/2024, Until Fri 9/6/2024, Normal           CONTINUE these medications which have NOT CHANGED    Details   adapalene (Differin) 0.1 % cream APPLY TOPICALLY DAILY AT BEDTIME, Normal      benzoyl peroxide 5 % external wash APPLY TOPICALLY 2 (TWO) TIMES A DAY APPLY TOPICALLY 2 (TWO) TIMES PER DAY, Starting Thu 7/25/2024, Normal      diazePAM, 20 MG Dose, (Valtoco 20 MG Dose) 2 x 10 MG/0.1ML LQPK 10 mg into each nostril as needed (for seizure longer than 5 minutes), Starting Wed 12/20/2023, Normal      FLUoxetine (PROzac) 20 mg capsule Take 2 capsules (40 mg total) by mouth daily, Starting Tue 3/19/2019, Normal      folic acid (KP Folic Acid) 1 mg tablet Take 1 tablet (1 mg total) by mouth daily, Starting Mon 4/22/2024, Normal      hydrOXYzine pamoate (VISTARIL) 25 mg capsule Take 1 capsule (25 mg total) by mouth 3 (three) times a day as needed for anxiety, Starting Tue 6/18/2019, Normal      ibuprofen (MOTRIN) 600 mg tablet Take 1 tablet (600 mg total) by mouth every 6 (six) hours as needed for moderate pain, Starting Wed 12/20/2023, Normal      lamoTRIgine (LaMICtal) 150 MG tablet Once completed with 25 mg tablets, take one tab by mouth twice a day., Normal             No discharge procedures on file.    PDMP Review       None            ED Provider  Electronically Signed by             Berny Faulkner MD  08/30/24 0145

## 2024-12-12 ENCOUNTER — APPOINTMENT (EMERGENCY)
Dept: RADIOLOGY | Facility: HOSPITAL | Age: 29
End: 2024-12-12
Payer: COMMERCIAL

## 2024-12-12 ENCOUNTER — APPOINTMENT (EMERGENCY)
Dept: CT IMAGING | Facility: HOSPITAL | Age: 29
End: 2024-12-12
Payer: COMMERCIAL

## 2024-12-12 ENCOUNTER — HOSPITAL ENCOUNTER (EMERGENCY)
Facility: HOSPITAL | Age: 29
Discharge: HOME | End: 2024-12-12
Payer: COMMERCIAL

## 2024-12-12 VITALS
WEIGHT: 202.38 LBS | TEMPERATURE: 99.7 F | HEART RATE: 93 BPM | OXYGEN SATURATION: 97 % | DIASTOLIC BLOOD PRESSURE: 70 MMHG | SYSTOLIC BLOOD PRESSURE: 109 MMHG | BODY MASS INDEX: 31.7 KG/M2 | RESPIRATION RATE: 20 BRPM

## 2024-12-12 DIAGNOSIS — M54.32 SCIATICA OF LEFT SIDE: Primary | ICD-10-CM

## 2024-12-12 LAB
EXT PREGNANCY TEST URINE: NEGATIVE
EXT. CONTROL: NORMAL
FLUAV AG UPPER RESP QL IA.RAPID: NEGATIVE
FLUBV AG UPPER RESP QL IA.RAPID: NEGATIVE
SARS-COV+SARS-COV-2 AG RESP QL IA.RAPID: NEGATIVE

## 2024-12-12 PROCEDURE — 72131 CT LUMBAR SPINE W/O DYE: CPT

## 2024-12-12 PROCEDURE — 87804 INFLUENZA ASSAY W/OPTIC: CPT

## 2024-12-12 PROCEDURE — 71046 X-RAY EXAM CHEST 2 VIEWS: CPT

## 2024-12-12 PROCEDURE — 96372 THER/PROPH/DIAG INJ SC/IM: CPT

## 2024-12-12 PROCEDURE — 99284 EMERGENCY DEPT VISIT MOD MDM: CPT

## 2024-12-12 PROCEDURE — 81025 URINE PREGNANCY TEST: CPT

## 2024-12-12 PROCEDURE — 87811 SARS-COV-2 COVID19 W/OPTIC: CPT

## 2024-12-12 RX ORDER — LIDOCAINE 50 MG/G
1 PATCH TOPICAL DAILY
Qty: 20 PATCH | Refills: 0 | Status: SHIPPED | OUTPATIENT
Start: 2024-12-12

## 2024-12-12 RX ORDER — NAPROXEN 500 MG/1
500 TABLET ORAL 2 TIMES DAILY WITH MEALS
Qty: 30 TABLET | Refills: 0 | Status: SHIPPED | OUTPATIENT
Start: 2024-12-12

## 2024-12-12 RX ORDER — LIDOCAINE 50 MG/G
2 PATCH TOPICAL ONCE
Status: DISCONTINUED | OUTPATIENT
Start: 2024-12-12 | End: 2024-12-12 | Stop reason: HOSPADM

## 2024-12-12 RX ORDER — ACETAMINOPHEN 325 MG/1
975 TABLET ORAL ONCE
Status: COMPLETED | OUTPATIENT
Start: 2024-12-12 | End: 2024-12-12

## 2024-12-12 RX ORDER — KETOROLAC TROMETHAMINE 30 MG/ML
15 INJECTION, SOLUTION INTRAMUSCULAR; INTRAVENOUS ONCE
Status: COMPLETED | OUTPATIENT
Start: 2024-12-12 | End: 2024-12-12

## 2024-12-12 RX ORDER — DEXAMETHASONE 4 MG/1
10 TABLET ORAL ONCE
Status: COMPLETED | OUTPATIENT
Start: 2024-12-12 | End: 2024-12-12

## 2024-12-12 RX ORDER — METHOCARBAMOL 500 MG/1
500 TABLET, FILM COATED ORAL 2 TIMES DAILY
Qty: 20 TABLET | Refills: 0 | Status: SHIPPED | OUTPATIENT
Start: 2024-12-12

## 2024-12-12 RX ORDER — METHOCARBAMOL 500 MG/1
500 TABLET, FILM COATED ORAL ONCE
Status: COMPLETED | OUTPATIENT
Start: 2024-12-12 | End: 2024-12-12

## 2024-12-12 RX ADMIN — LIDOCAINE 2 PATCH: 50 PATCH TOPICAL at 08:56

## 2024-12-12 RX ADMIN — ACETAMINOPHEN 975 MG: 325 TABLET ORAL at 08:56

## 2024-12-12 RX ADMIN — METHOCARBAMOL TABLETS 500 MG: 500 TABLET, COATED ORAL at 08:56

## 2024-12-12 RX ADMIN — KETOROLAC TROMETHAMINE 15 MG: 30 INJECTION, SOLUTION INTRAMUSCULAR at 08:56

## 2024-12-12 RX ADMIN — DEXAMETHASONE 10 MG: 4 TABLET ORAL at 12:32

## 2024-12-12 NOTE — Clinical Note
Lilliam Leyva was seen and treated in our emergency department on 12/12/2024.                Diagnosis: sciatica    Lilliam  may return to work on return date.    She may return on this date: 12/14/2024         If you have any questions or concerns, please don't hesitate to call.      Ghulam Harvey, DO    ______________________________           _______________          _______________  Hospital Representative                              Date                                Time

## 2024-12-12 NOTE — DISCHARGE INSTRUCTIONS
Naprosyn and Robaxin as prescribed.  Be advised, Robaxin is a muscle relaxant and can make you drowsy.  If it makes you too drowsy, do not drive on this medication.  Only use at night to facilitate sleep if necessary.  Use lidocaine patches as prescribed and change them every 12 hours.  Follow-up with [facility] to schedule a follow-up appointment.  Return to the ED with any new or worsening concerns.

## 2024-12-13 ENCOUNTER — NURSE TRIAGE (OUTPATIENT)
Dept: PHYSICAL THERAPY | Facility: OTHER | Age: 29
End: 2024-12-13

## 2024-12-13 DIAGNOSIS — M54.42 ACUTE BILATERAL LOW BACK PAIN WITH LEFT-SIDED SCIATICA: Primary | ICD-10-CM

## 2024-12-13 NOTE — TELEPHONE ENCOUNTER
"Additional Information   Negative: Is this related to a work injury?   Negative: Is this related to an MVA?   Negative: Are you currently recieving homecare services?    Background - Initial Assessment  Clinical complaint: ED visit on 12/12 due to Lower Back Pain radiating down to the left leg that started yesterday. Hx of same pain but \"not as strong as this episode\". No pain in her upper body. No numbness or tingling. NKI. Not seeing a spine specialist yet. Patient states pain is constant, worse with movements and certain positions especially, standing up for too long. Patient described pain as sharp and stabbing.   Date of onset: 12/12/24 ( new flare up)  Frequency of pain: constant  Quality of pain: sharp and stabbing.    Protocols used: [facility] Protocol    "

## 2024-12-13 NOTE — TELEPHONE ENCOUNTER
Additional Information   Negative: Has the patient had unexplained weight loss?   Negative: Does the patient have a fever?   Negative: Is the patient experiencing blood in sputum?   Negative: Is this a chronic condition?   Negative: Is the patient experiencing urine retention?   Negative: Is the patient experiencing acute drop foot or paralysis?   Negative: Has the patient experienced major trauma? (fall from height, high speed collision, direct blow to spine) and is also experiencing nausea, light-headedness, or loss of consciousness?    Protocols used: [facility] Protocol    [facility] was reviewed in detail and what we can provide for their back pain.  Patient is agreeable to being triaged and would like to proceed with Physical Therapy.    Referral was placed for Physical Therapy at the [place] site. Patients information was sent to the  to make evaluation appointment. Patient made aware that the PT office  will be calling to schedule the appointment.  Patient was provided with the phone number to the PT office.    No further questions and/or concerns were voiced by the patient at this time. Patient states understanding of the referral that was placed.    Referral Closed.

## 2024-12-14 NOTE — ED PROVIDER NOTES
Time reflects when diagnosis was documented in both MDM as applicable and the Disposition within this note       Time User Action Codes Description Comment    12/12/2024 12:22 PM Theodore Harveyel Misbah [M54.32] Sciatica of left side           ED Disposition       ED Disposition   Discharge    Condition   Stable    Date/Time   Thu Dec 12, 2024 12:22 PM    Comment   Lilliam Leyva discharge to home/self care.                   Assessment & Plan       Medical Decision Making  Patient is a 29-year-old female with past medical history significant for seizures disorder, presenting to the ED for evaluation of 1 day history of lower back pain.  Pain is atraumatic, not associate with any red flag symptoms for acute spinal cord dysfunction.  Pain is radicular in nature, radiating down the left lower extremity.  Presentation consistent with likely sciatica, but also includes disc bulging, herniated disc, musculoskeletal strain.  Patient also endorsing some nasal congestion and dry cough over the past few days.  Differential for this includes viral syndrome, pneumonia.  Due to patient's history of surgical procedure to the back nearly 2 years ago, we will obtain a CT of the lumbar spine to determine if either of any soft tissue abnormalities, or acute concerning pathology in the lumbar region.  Will also add a viral swab and a chest x-ray for viral type symptoms.  Patient treated symptomatically for back pain with marked improvement of the pain.    All testing reviewed with the patient, including incidental finding on CT of possible anatomic abnormality in the lumbar spine.  Patient will follow-up with her PCP.  Chest x-ray is negative as well as a viral swab.  Patient's tachycardia improved likely due to the improvement of pain.  Patient was prescribed medications for use at home for her back pain.  An ambulatory referral was placed to comprehensive spine.    I reviewed all testing with the patient:   I gave oral return precautions  for what to return for in addition to the written return precautions.   The patient  verbalized understanding of the discharge instructions and warnings that would necessitate return to the Emergency Department.  I specifically highlighted areas of special concern regarding the written and verbal discharge instructions and return precautions.    All questions were answered prior to discharge.      Amount and/or Complexity of Data Reviewed  Labs: ordered. Decision-making details documented in ED Course.  Radiology: ordered and independent interpretation performed.    Risk  OTC drugs.  Prescription drug management.        ED Course as of 12/13/24 2048   Thu Dec 12, 2024   0924 SARS COV Rapid Antigen: Negative   0924 Influenza A Rapid Antigen: Negative   0924 Influenza B Rapid Antigen: Negative   1110 IMPRESSION:     Only 4 nonrib-bearing lumbar vertebral body segments. Using this nomenclature, the last normal appearing disc space  is labeled L4-S1. Prior to any lumbar spine procedure, recommend imaging of the entire spine for more accurate assessment of vertebral   segment numbering.     No lumbar spine fracture identified.     Mild lumbar spondylosis, as described above, without canal stenosis. Mild right foraminal stenosis at L4-S1.         Medications   ketorolac (TORADOL) injection 15 mg (15 mg Intramuscular Given 12/12/24 0856)   acetaminophen (TYLENOL) tablet 975 mg (975 mg Oral Given 12/12/24 0856)   methocarbamol (ROBAXIN) tablet 500 mg (500 mg Oral Given 12/12/24 0856)   dexamethasone (DECADRON) tablet 10 mg (10 mg Oral Given 12/12/24 1232)       ED Risk Strat Scores                                              History of Present Illness       Chief Complaint   Patient presents with    Back Pain     Middle lower lumber pain that radiates down left leg x 1 day, denies injury. Denies bowl and bladder incontinence        Past Medical History:   Diagnosis Date    Chest pain     Menstrual cramps 11/14/2018     Nephrolithiasis 07/12/2018    Palpitations     Pelvic pain 04/30/2015    Seizure (HCC) 02/11/2022    Syncope       History reviewed. No pertinent surgical history.   Family History   Problem Relation Age of Onset    No Known Problems Mother     Diabetes Father     Other Father         tumor     Diabetes Maternal Grandfather     Diabetes Paternal Grandfather     Cancer Paternal Aunt       Social History     Tobacco Use    Smoking status: Never    Smokeless tobacco: Never   Vaping Use    Vaping status: Never Used   Substance Use Topics    Alcohol use: No    Drug use: No      E-Cigarette/Vaping    E-Cigarette Use Never User       E-Cigarette/Vaping Substances    Nicotine No     THC No     CBD No     Flavoring No     Other No     Unknown No       I have reviewed and agree with the history as documented.     Patient is a 29-year-old female with past medical history significant for seizure disorder, presenting to the ED for evaluation of 1 day history of lower back pain.  Patient denies injury or trauma to the back.  Denies heavy lifting.  States that she was sitting down when the pain started.  It was initially on the left side of her lower back, now radiating across to the right side.  Also associated with radiation down the left buttock to the mid left thigh.  Patient denies associated saddle anesthesia, urinary or bowel incontinence, IV drug abuse, recent fevers.  She does note that 2 years ago in the process of diagnosing her seizure disorder, she did have a lumbar puncture.  Otherwise no surgical procedures to the back.  Patient states that the pain is worsened with prolonged sitting or standing, as well as ambulation.  Patient is able to ambulate however despite the pain.  As a secondary complaint, patient also endorsing few day history of nasal congestion, dry cough, generalized bodyaches.  She does have sick contacts with similar symptoms.  Denies any chest pain, difficulty breathing, abdominal pain, nausea,  vomiting.        Review of Systems   HENT:  Positive for congestion and rhinorrhea.    Respiratory:  Positive for cough.    Musculoskeletal:  Positive for back pain.   All other systems reviewed and are negative.          Objective       ED Triage Vitals [12/12/24 0818]   Temperature Pulse Blood Pressure Respirations SpO2 Patient Position - Orthostatic VS   99.7 °F (37.6 °C) (!) 120 110/64 20 100 % Sitting      Temp Source Heart Rate Source BP Location FiO2 (%) Pain Score    Oral Monitor Left arm -- --      Vitals      Date and Time Temp Pulse SpO2 Resp BP Pain Score FACES Pain Rating User   12/12/24 1121 -- 93 97 % -- 109/70 -- -- SB   12/12/24 0818 99.7 °F (37.6 °C) 120 100 % 20 110/64 -- -- JM            Physical Exam  Vitals and nursing note reviewed.   Constitutional:       General: She is not in acute distress.     Appearance: Normal appearance. She is well-developed and normal weight. She is not ill-appearing, toxic-appearing or diaphoretic.   HENT:      Head: Normocephalic and atraumatic.      Right Ear: External ear normal.      Left Ear: External ear normal.      Nose: Congestion present. No rhinorrhea.      Mouth/Throat:      Mouth: Mucous membranes are moist.      Pharynx: Oropharynx is clear.      Comments: Post nasal drip  Eyes:      Conjunctiva/sclera: Conjunctivae normal.   Cardiovascular:      Rate and Rhythm: Normal rate and regular rhythm.      Pulses: Normal pulses.      Heart sounds: Normal heart sounds. No murmur heard.  Pulmonary:      Effort: Pulmonary effort is normal. No respiratory distress.      Breath sounds: Normal breath sounds. No wheezing, rhonchi or rales.   Chest:      Chest wall: No tenderness.   Abdominal:      Palpations: Abdomen is soft.      Tenderness: There is no abdominal tenderness.   Musculoskeletal:         General: No swelling.      Cervical back: Normal, normal range of motion and neck supple. No rigidity or tenderness.      Thoracic back: Normal.      Lumbar back:  Tenderness (across lower back, including midline L4-L5; L>R paralumbar tenderness with spasm) present. No swelling, edema, deformity or signs of trauma. Positive left straight leg raise test.      Right lower leg: No edema.      Left lower leg: No edema.   Skin:     General: Skin is warm and dry.      Capillary Refill: Capillary refill takes less than 2 seconds.      Findings: No erythema.   Neurological:      General: No focal deficit present.      Mental Status: She is alert and oriented to person, place, and time.      Sensory: No sensory deficit.      Motor: No weakness.      Coordination: Coordination normal.      Gait: Gait normal.   Psychiatric:         Mood and Affect: Mood normal.         Results Reviewed       Procedure Component Value Units Date/Time    POCT pregnancy, urine [[id number]]  (Normal) Collected: 12/12/24 1014    Lab Status: Final result Updated: 12/12/24 1014     EXT Preg Test, Ur Negative     Control Valid    FLU/COVID Rapid Antigen (30 min. TAT) - Preferred screening test in ED [[id number]]  (Normal) Collected: 12/12/24 0903    Lab Status: Final result Specimen: Nares from Nose Updated: 12/12/24 0924     SARS COV Rapid Antigen Negative     Influenza A Rapid Antigen Negative     Influenza B Rapid Antigen Negative    Narrative:      This test has been performed using the Quidel Shauna 2 FLU+SARS Antigen test under the Emergency Use Authorization (EUA). This test has been validated by the  and verified by the performing laboratory. The Shauna uses lateral flow immunofluorescent sandwich assay to detect SARS-COV, Influenza A and Influenza B Antigen.     The Quidel Shauna 2 SARS Antigen test does not differentiate between SARS-CoV and SARS-CoV-2.     Negative results are presumptive and may be confirmed with a molecular assay, if necessary, for patient management. Negative results do not rule out SARS-CoV-2 or influenza infection and should not be used as the sole basis for treatment or  patient management decisions. A negative test result may occur if the level of antigen in a sample is below the limit of detection of this test.     Positive results are indicative of the presence of viral antigens, but do not rule out bacterial infection or co-infection with other viruses.     All test results should be used as an adjunct to clinical observations and other information available to the provider.    FOR PEDIATRIC PATIENTS - copy/paste COVID Guidelines URL to browser: [email or web address]            CT lumbar spine without contrast   Final Interpretation by Aura Lin MD (12/12 4309)      Only 4 nonrib-bearing lumbar vertebral body segments. Using this nomenclature, the last normal appearing disc space  is labeled L4-S1. Prior to any lumbar spine procedure, recommend imaging of the entire spine for more accurate assessment of vertebral    segment numbering.      No lumbar spine fracture identified.      Mild lumbar spondylosis, as described above, without canal stenosis. Mild right foraminal stenosis at L4-S1.      Workstation performed: [id number]         XR chest 2 views   ED Interpretation by Ghulam Harvey DO (12/12 0927)   No acute cardiopulmonary disease as interpreted by me.      Final Interpretation by Archana Lazcano MD (12/12 0968)      No acute cardiopulmonary disease.            Resident: Humberto Wallis I, the attending radiologist, have reviewed the images and agree with the final report above.      Workstation performed: [id number]             Procedures    ED Medication and Procedure Management   Prior to Admission Medications   Prescriptions Last Dose Informant Patient Reported? Taking?   FLUoxetine (PROzac) 20 mg capsule  Self No No   Sig: Take 2 capsules (40 mg total) by mouth daily   Patient not taking: Reported on 7/24/2024   adapalene (Differin) 0.1 % cream  Self No No   Sig: APPLY TOPICALLY DAILY AT BEDTIME    Patient not taking: Reported on 7/24/2024   benzoyl peroxide 5 % external wash   No No   Sig: APPLY TOPICALLY 2 (TWO) TIMES A DAY APPLY TOPICALLY 2 (TWO) TIMES PER DAY   diazePAM, 20 MG Dose, (Valtoco 20 MG Dose) 2 x 10 MG/0.1ML LQPK  Self No No   Sig: 10 mg into each nostril as needed (for seizure longer than 5 minutes)   Patient not taking: Reported on 7/24/2024   folic acid ( Folic Acid) 1 mg tablet   No No   Sig: Take 1 tablet (1 mg total) by mouth daily   hydrOXYzine pamoate (VISTARIL) 25 mg capsule  Self No No   Sig: Take 1 capsule (25 mg total) by mouth 3 (three) times a day as needed for anxiety   Patient not taking: Reported on 7/24/2024   ibuprofen (MOTRIN) 600 mg tablet  Self No No   Sig: Take 1 tablet (600 mg total) by mouth every 6 (six) hours as needed for moderate pain   Patient not taking: Reported on 7/24/2024   lamoTRIgine (LaMICtal) 150 MG tablet   No No   Sig: Once completed with 25 mg tablets, take one tab by mouth twice a day.      Facility-Administered Medications: None     Discharge Medication List as of 12/12/2024 12:27 PM        START taking these medications    Details   lidocaine (Lidoderm) 5 % Apply 1 patch topically over 12 hours daily Remove & Discard patch within 12 hours or as directed by MD, Starting Thu 12/12/2024, Normal      methocarbamol (ROBAXIN) 500 mg tablet Take 1 tablet (500 mg total) by mouth 2 (two) times a day, Starting Thu 12/12/2024, Normal      naproxen (Naprosyn) 500 mg tablet Take 1 tablet (500 mg total) by mouth 2 (two) times a day with meals, Starting Thu 12/12/2024, Normal           CONTINUE these medications which have NOT CHANGED    Details   adapalene (Differin) 0.1 % cream APPLY TOPICALLY DAILY AT BEDTIME, Normal      benzoyl peroxide 5 % external wash APPLY TOPICALLY 2 (TWO) TIMES A DAY APPLY TOPICALLY 2 (TWO) TIMES PER DAY, Starting Thu 7/25/2024, Normal      diazePAM, 20 MG Dose, (Valtoco 20 MG Dose) 2 x 10 MG/0.1ML LQPK 10 mg into each nostril as  needed (for seizure longer than 5 minutes), Starting Wed 12/20/2023, Normal      FLUoxetine (PROzac) 20 mg capsule Take 2 capsules (40 mg total) by mouth daily, Starting Tue 3/19/2019, Normal      folic acid (KP Folic Acid) 1 mg tablet Take 1 tablet (1 mg total) by mouth daily, Starting Mon 4/22/2024, Normal      hydrOXYzine pamoate (VISTARIL) 25 mg capsule Take 1 capsule (25 mg total) by mouth 3 (three) times a day as needed for anxiety, Starting Tue 6/18/2019, Normal      ibuprofen (MOTRIN) 600 mg tablet Take 1 tablet (600 mg total) by mouth every 6 (six) hours as needed for moderate pain, Starting Wed 12/20/2023, Normal      lamoTRIgine (LaMICtal) 150 MG tablet Once completed with 25 mg tablets, take one tab by mouth twice a day., Normal             ED SEPSIS DOCUMENTATION   Time reflects when diagnosis was documented in both MDM as applicable and the Disposition within this note       Time User Action Codes Description Comment    12/12/2024 12:22 PM Ghulam Harvey Add [M54.32] Sciatica of left side                  Ghulam Harvey,   12/13/24 2048

## 2025-01-08 DIAGNOSIS — R56.9 SEIZURE (HCC): ICD-10-CM

## 2025-01-09 RX ORDER — FOLIC ACID 1 MG/1
1 TABLET ORAL DAILY
Qty: 90 TABLET | Refills: 0 | Status: SHIPPED | OUTPATIENT
Start: 2025-01-09

## 2025-01-09 RX ORDER — IBUPROFEN 600 MG/1
600 TABLET, FILM COATED ORAL EVERY 6 HOURS PRN
Qty: 30 TABLET | Refills: 0 | Status: SHIPPED | OUTPATIENT
Start: 2025-01-09

## 2025-01-19 ENCOUNTER — HOSPITAL ENCOUNTER (EMERGENCY)
Facility: HOSPITAL | Age: 30
Discharge: HOME | End: 2025-01-19
Payer: COMMERCIAL

## 2025-01-19 VITALS
RESPIRATION RATE: 18 BRPM | HEART RATE: 66 BPM | SYSTOLIC BLOOD PRESSURE: 106 MMHG | DIASTOLIC BLOOD PRESSURE: 67 MMHG | BODY MASS INDEX: 29.66 KG/M2 | WEIGHT: 189 LBS | OXYGEN SATURATION: 92 % | HEIGHT: 67 IN | TEMPERATURE: 98 F

## 2025-01-19 DIAGNOSIS — L24.3 IRRITANT CONTACT DERMATITIS DUE TO COSMETICS: Primary | ICD-10-CM

## 2025-01-19 PROCEDURE — 96375 TX/PRO/DX INJ NEW DRUG ADDON: CPT

## 2025-01-19 PROCEDURE — 99283 EMERGENCY DEPT VISIT LOW MDM: CPT

## 2025-01-19 PROCEDURE — 96374 THER/PROPH/DIAG INJ IV PUSH: CPT

## 2025-01-19 PROCEDURE — 99284 EMERGENCY DEPT VISIT MOD MDM: CPT | Performed by: NURSE PRACTITIONER

## 2025-01-19 RX ORDER — DIPHENHYDRAMINE HCL 50 MG
50 CAPSULE ORAL EVERY 6 HOURS PRN
Qty: 30 CAPSULE | Refills: 0 | Status: SHIPPED | OUTPATIENT
Start: 2025-01-19 | End: 2025-01-24

## 2025-01-19 RX ORDER — PREDNISONE 20 MG/1
60 TABLET ORAL DAILY
Qty: 15 TABLET | Refills: 0 | Status: SHIPPED | OUTPATIENT
Start: 2025-01-19 | End: 2025-01-24

## 2025-01-19 RX ORDER — METHYLPREDNISOLONE SODIUM SUCCINATE 125 MG/2ML
80 INJECTION, POWDER, LYOPHILIZED, FOR SOLUTION INTRAMUSCULAR; INTRAVENOUS ONCE
Status: COMPLETED | OUTPATIENT
Start: 2025-01-19 | End: 2025-01-19

## 2025-01-19 RX ORDER — DIPHENHYDRAMINE HYDROCHLORIDE 50 MG/ML
25 INJECTION INTRAMUSCULAR; INTRAVENOUS ONCE
Status: COMPLETED | OUTPATIENT
Start: 2025-01-19 | End: 2025-01-19

## 2025-01-19 RX ADMIN — METHYLPREDNISOLONE SODIUM SUCCINATE 80 MG: 125 INJECTION, POWDER, FOR SOLUTION INTRAMUSCULAR; INTRAVENOUS at 10:29

## 2025-01-19 RX ADMIN — DIPHENHYDRAMINE HYDROCHLORIDE 25 MG: 50 INJECTION, SOLUTION INTRAMUSCULAR; INTRAVENOUS at 10:29

## 2025-01-19 NOTE — Clinical Note
Lilliam Leyva was seen and treated in our emergency department on 1/19/2025.                Diagnosis:     Lilliam  may return to work on return date.    She may return on this date: 01/21/2025         If you have any questions or concerns, please don't hesitate to call.      MADDIE Torres    ______________________________           _______________          _______________  Hospital Representative                              Date                                Time

## 2025-01-19 NOTE — ED PROVIDER NOTES
Time reflects when diagnosis was documented in both MDM as applicable and the Disposition within this note       Time User Action Codes Description Comment    1/19/2025 10:27 AM Ranjit Garcia Add [L24.3] Irritant contact dermatitis due to cosmetics           ED Disposition       ED Disposition   Discharge    Condition   Stable    Date/Time   Sun Jan 19, 2025 10:27 AM    Comment   Lilliam Leyva discharge to home/self care.                   Assessment & Plan       Medical Decision Making  Facial dermatitis without any evidence of systemic effect.  Will begin systemic steroids and antihistamine and avoid irritant.    Risk  Prescription drug management.             Medications   methylPREDNISolone sodium succinate (Solu-MEDROL) injection 80 mg (80 mg Intravenous Given 1/19/25 1029)   diphenhydrAMINE (BENADRYL) injection 25 mg (25 mg Intravenous Given 1/19/25 1029)       ED Risk Strat Scores                                              History of Present Illness       Chief Complaint   Patient presents with    Allergic Reaction     Pt reports using a new facial cleanser for past week and developed itchy rash on face with swelling       Past Medical History:   Diagnosis Date    Chest pain     Menstrual cramps 11/14/2018    Nephrolithiasis 07/12/2018    Palpitations     Pelvic pain 04/30/2015    Seizure (HCC) 02/11/2022    Syncope       History reviewed. No pertinent surgical history.   Family History   Problem Relation Age of Onset    No Known Problems Mother     Diabetes Father     Other Father         tumor     Diabetes Maternal Grandfather     Diabetes Paternal Grandfather     Cancer Paternal Aunt       Social History     Tobacco Use    Smoking status: Never    Smokeless tobacco: Never   Vaping Use    Vaping status: Never Used   Substance Use Topics    Alcohol use: No    Drug use: No      E-Cigarette/Vaping    E-Cigarette Use Never User       E-Cigarette/Vaping Substances    Nicotine No     THC No     CBD No      Flavoring No     Other No     Unknown No       I have reviewed and agree with the history as documented.     29-year-old female presenting here with a chief complaint of facial rash and itching which is greater on the right than the left.  She has some suborbital involvement on the right which was itchy and not painful.  She was doing a new cosmetic for about a week before she got irritated and stopped she has been without it for the last 2 days      Allergic Reaction  Presenting symptoms: rash    Presenting symptoms: no wheezing        Review of Systems   Constitutional:  Negative for diaphoresis, fatigue and fever.   HENT:  Negative for congestion, ear pain, nosebleeds and sore throat.    Eyes:  Negative for photophobia, pain, discharge and visual disturbance.   Respiratory:  Negative for cough, choking, chest tightness, shortness of breath and wheezing.    Cardiovascular:  Negative for chest pain and palpitations.   Gastrointestinal:  Negative for abdominal distention, abdominal pain, diarrhea and vomiting.   Genitourinary:  Negative for dysuria, flank pain and frequency.   Musculoskeletal:  Negative for back pain, gait problem and joint swelling.   Skin:  Positive for rash. Negative for color change.   Neurological:  Negative for dizziness, syncope and headaches.   Psychiatric/Behavioral:  Negative for behavioral problems and confusion. The patient is not nervous/anxious.    All other systems reviewed and are negative.          Objective       ED Triage Vitals [01/19/25 0934]   Temperature Pulse Blood Pressure Respirations SpO2 Patient Position - Orthostatic VS   98 °F (36.7 °C) 71 117/56 18 100 % Sitting      Temp Source Heart Rate Source BP Location FiO2 (%) Pain Score    Temporal Monitor Left arm -- --      Vitals      Date and Time Temp Pulse SpO2 Resp BP Pain Score FACES Pain Rating User   01/19/25 1030 -- 66 92 % 18 106/67 -- -- CC   01/19/25 1000 -- 58 100 % 18 107/71 -- -- CC   01/19/25 0945 -- 62 100 %  18 113/69 -- -- CC   01/19/25 0934 98 °F (36.7 °C) 71 100 % 18 117/56 -- -- RO            Physical Exam  Vitals and nursing note reviewed.   Constitutional:       General: She is not in acute distress.     Appearance: She is well-developed. She is not ill-appearing or toxic-appearing.   HENT:      Head: Normocephalic and atraumatic.      Nose: No rhinorrhea.      Mouth/Throat:      Mouth: Mucous membranes are moist.      Dentition: Normal dentition.   Eyes:      General:         Right eye: No discharge.         Left eye: No discharge.   Cardiovascular:      Rate and Rhythm: Normal rate and regular rhythm.   Pulmonary:      Effort: Pulmonary effort is normal. No accessory muscle usage or respiratory distress.   Abdominal:      General: There is no distension.      Tenderness: There is no guarding.   Musculoskeletal:         General: Normal range of motion.      Cervical back: Normal range of motion and neck supple. No rigidity.   Skin:     General: Skin is warm and dry.      Findings: Rash present.   Neurological:      Mental Status: She is alert and oriented to person, place, and time.      Coordination: Coordination normal.   Psychiatric:         Behavior: Behavior is cooperative.         Results Reviewed       None            No orders to display       Procedures    ED Medication and Procedure Management   Prior to Admission Medications   Prescriptions Last Dose Informant Patient Reported? Taking?   FLUoxetine (PROzac) 20 mg capsule  Self No No   Sig: Take 2 capsules (40 mg total) by mouth daily   Patient not taking: Reported on 7/24/2024   adapalene (Differin) 0.1 % cream  Self No No   Sig: APPLY TOPICALLY DAILY AT BEDTIME   Patient not taking: Reported on 7/24/2024   benzoyl peroxide 5 % external wash   No No   Sig: APPLY TOPICALLY 2 (TWO) TIMES A DAY APPLY TOPICALLY 2 (TWO) TIMES PER DAY   diazePAM, 20 MG Dose, (Valtoco 20 MG Dose) 2 x 10 MG/0.1ML LQPK  Self No No   Sig: 10 mg into each nostril as needed (for  seizure longer than 5 minutes)   Patient not taking: Reported on 7/24/2024   folic acid ( Folic Acid) 1 mg tablet   No No   Sig: Take 1 tablet (1 mg total) by mouth daily   hydrOXYzine pamoate (VISTARIL) 25 mg capsule  Self No No   Sig: Take 1 capsule (25 mg total) by mouth 3 (three) times a day as needed for anxiety   Patient not taking: Reported on 7/24/2024   ibuprofen (MOTRIN) 600 mg tablet   No No   Sig: Take 1 tablet (600 mg total) by mouth every 6 (six) hours as needed for moderate pain   lamoTRIgine (LaMICtal) 150 MG tablet   No No   Sig: Once completed with 25 mg tablets, take one tab by mouth twice a day.   lidocaine (Lidoderm) 5 %   No No   Sig: Apply 1 patch topically over 12 hours daily Remove & Discard patch within 12 hours or as directed by MD   methocarbamol (ROBAXIN) 500 mg tablet   No No   Sig: Take 1 tablet (500 mg total) by mouth 2 (two) times a day   naproxen (Naprosyn) 500 mg tablet   No No   Sig: Take 1 tablet (500 mg total) by mouth 2 (two) times a day with meals      Facility-Administered Medications: None     Discharge Medication List as of 1/19/2025 10:28 AM        START taking these medications    Details   diphenhydrAMINE (BENADRYL) 50 mg capsule Take 1 capsule (50 mg total) by mouth every 6 (six) hours as needed for itching for up to 5 days, Starting Sun 1/19/2025, Until Fri 1/24/2025 at 2359, Normal      predniSONE 20 mg tablet Take 3 tablets (60 mg total) by mouth daily for 5 days, Starting Sun 1/19/2025, Until Fri 1/24/2025, Normal           CONTINUE these medications which have NOT CHANGED    Details   adapalene (Differin) 0.1 % cream APPLY TOPICALLY DAILY AT BEDTIME, Normal      benzoyl peroxide 5 % external wash APPLY TOPICALLY 2 (TWO) TIMES A DAY APPLY TOPICALLY 2 (TWO) TIMES PER DAY, Starting u 7/25/2024, Normal      diazePAM, 20 MG Dose, (Valtoco 20 MG Dose) 2 x 10 MG/0.1ML LQPK 10 mg into each nostril as needed (for seizure longer than 5 minutes), Starting Wed 12/20/2023,  Normal      FLUoxetine (PROzac) 20 mg capsule Take 2 capsules (40 mg total) by mouth daily, Starting Tue 3/19/2019, Normal      folic acid (KP Folic Acid) 1 mg tablet Take 1 tablet (1 mg total) by mouth daily, Starting Thu 1/9/2025, Normal      hydrOXYzine pamoate (VISTARIL) 25 mg capsule Take 1 capsule (25 mg total) by mouth 3 (three) times a day as needed for anxiety, Starting Tue 6/18/2019, Normal      ibuprofen (MOTRIN) 600 mg tablet Take 1 tablet (600 mg total) by mouth every 6 (six) hours as needed for moderate pain, Starting Thu 1/9/2025, Normal      lamoTRIgine (LaMICtal) 150 MG tablet Once completed with 25 mg tablets, take one tab by mouth twice a day., Normal      lidocaine (Lidoderm) 5 % Apply 1 patch topically over 12 hours daily Remove & Discard patch within 12 hours or as directed by MD, Starting Thu 12/12/2024, Normal      methocarbamol (ROBAXIN) 500 mg tablet Take 1 tablet (500 mg total) by mouth 2 (two) times a day, Starting Thu 12/12/2024, Normal      naproxen (Naprosyn) 500 mg tablet Take 1 tablet (500 mg total) by mouth 2 (two) times a day with meals, Starting Thu 12/12/2024, Normal           No discharge procedures on file.  ED SEPSIS DOCUMENTATION   Time reflects when diagnosis was documented in both MDM as applicable and the Disposition within this note       Time User Action Codes Description Comment    1/19/2025 10:27 AM Ranjit Garcia Add [L24.3] Irritant contact dermatitis due to cosmetics                  MADDIE Torres  01/19/25 3351

## 2025-01-30 DIAGNOSIS — R56.9 SEIZURE (HCC): ICD-10-CM

## 2025-01-30 RX ORDER — LAMOTRIGINE 150 MG/1
150 TABLET ORAL 2 TIMES DAILY
Qty: 60 TABLET | Refills: 17 | Status: SHIPPED | OUTPATIENT
Start: 2025-01-30

## 2025-02-05 NOTE — TELEPHONE ENCOUNTER
Pt called in to Schedule F/U appt.     LOV 4/22/24 with Dr. valencia   Return in about 6 months (around 10/22/2024).    [facility] to the [facility].

## 2025-02-16 DIAGNOSIS — R56.9 SEIZURE (HCC): ICD-10-CM

## 2025-02-17 RX ORDER — LAMOTRIGINE 150 MG/1
150 TABLET ORAL 2 TIMES DAILY
Qty: 60 TABLET | Refills: 5 | Status: SHIPPED | OUTPATIENT
Start: 2025-02-17

## 2025-04-14 ENCOUNTER — OFFICE VISIT (OUTPATIENT)
Dept: NEUROLOGY | Facility: CLINIC | Age: 30
End: 2025-04-14

## 2025-04-14 VITALS
WEIGHT: 221.2 LBS | DIASTOLIC BLOOD PRESSURE: 66 MMHG | BODY MASS INDEX: 34.64 KG/M2 | TEMPERATURE: 97.8 F | SYSTOLIC BLOOD PRESSURE: 112 MMHG

## 2025-04-14 DIAGNOSIS — E53.8 B12 DEFICIENCY: ICD-10-CM

## 2025-04-14 DIAGNOSIS — E55.9 VITAMIN D DEFICIENCY: ICD-10-CM

## 2025-04-14 DIAGNOSIS — R41.3 MEMORY CHANGES: Primary | ICD-10-CM

## 2025-04-14 PROCEDURE — 99215 OFFICE O/P EST HI 40 MIN: CPT | Performed by: STUDENT IN AN ORGANIZED HEALTH CARE EDUCATION/TRAINING PROGRAM

## 2025-04-14 RX ORDER — ELECTROLYTES/DEXTROSE
1 SOLUTION, ORAL ORAL DAILY
Qty: 90 TABLET | Refills: 3 | Status: SHIPPED | OUTPATIENT
Start: 2025-04-14

## 2025-04-14 NOTE — PROGRESS NOTES
Name: Lilliam Leyva      : 1995      MRN: [id number]  Encounter Provider: Haim Valencia MD  Encounter Date: 2025   Encounter department: [facility] [place]  :  Assessment & Plan      {Ambulatory Patient Instructions (Optional):63283}    History of Present Illness {?Quick Links Encounters * My Last Note * Last Note in Specialty * Snapshot * Since Last Visit * History :75600}  HPI   Review of Systems   Constitutional:  Negative for appetite change, fatigue and fever.   HENT: Negative.  Negative for hearing loss, tinnitus, trouble swallowing and voice change.    Eyes: Negative.  Negative for photophobia, pain and visual disturbance.   Respiratory: Negative.  Negative for shortness of breath.    Cardiovascular: Negative.  Negative for palpitations.   Gastrointestinal: Negative.  Negative for nausea and vomiting.   Endocrine: Negative.  Negative for cold intolerance.   Genitourinary: Negative.  Negative for dysuria, frequency and urgency.   Musculoskeletal:  Negative for back pain, gait problem, myalgias, neck pain and neck stiffness.   Skin: Negative.  Negative for rash.   Allergic/Immunologic: Negative.    Neurological:  Negative for dizziness, tremors, seizures, syncope, facial asymmetry, speech difficulty, weakness, light-headedness, numbness and headaches.   Hematological: Negative.  Does not bruise/bleed easily.   Psychiatric/Behavioral:  Positive for confusion (SHORT TERM MEMORY). Negative for hallucinations and sleep disturbance.    All other systems reviewed and are negative.   I have personally reviewed the MA's review of systems and made changes as necessary.    {Select to insert medical history sections (Optional):80515}     Objective {?Quick Links Trend Vitals * Enter New Vitals * Results Review * Timeline (Adult) * Labs * Imaging * Cardiology * Procedures * Lung Cancer Screening * Surgical eConsent :10977}  There were no vitals taken for this visit.    Physical  Exam  Neurological Exam    {Radiology Results Review (Optional):61039}    {Administrative / Billing Section (Optional):13526}

## 2025-04-14 NOTE — PROGRESS NOTES
[facility] -   Follow up appointment    Impression/Plan    Lilliam Leyva is a 29 y.o. female with a PMH of dysmenorrhea, acne, keratosis pilaris, obesity, syncope, HSV-2, and dysuria presenting to the [facility] for follow up of paroxysmal spells and memory complaints. Her neurological exam is normal. She continues to complain of memory issues, this could possibly be related to her vitamin deficiencies. Plan outlined below:    #Seizure-like activity  - C/w Lamotrigine 150 BID    #Vit D deficiency:  -daily multivitamin  - Recheck Vit D in 2 months    #B12 deficiency  - Daily multivitalin  - Recheck in 2 months    - Follow up in 1 year    We discussed the pathophysiology of epilepsy/seizure and seizure safety/precautions including driving restrictions following seizures. We discussed factors that can lower seizure threshold and the side effects of antiepileptic medications. Lastly, counseling about AEDs and contraception was addressed.    Diagnoses and all orders for this visit:    Memory changes  -     Multiple Vitamin (Multivitamin Adult) TABS; Take 1 tablet by mouth in the morning    Vitamin D deficiency  -     Multiple Vitamin (Multivitamin Adult) TABS; Take 1 tablet by mouth in the morning  -     Vitamin B12; Future  -     Vitamin D 1,25 Dihydroxy; Future    B12 deficiency  -     Multiple Vitamin (Multivitamin Adult) TABS; Take 1 tablet by mouth in the morning  -     Vitamin B12; Future  -     Vitamin D 1,25 Dihydroxy; Future        Subjective    Lilliam Leyva is a 29 y.o. female with a PMH of dysmenorrhea, acne, keratosis pilaris, obesity, syncope, HSV-2, and dysuria presenting to the [facility] for follow up of paroxysmal spells and memory complaints.     For review:     Her mother who has witnessed all of her episodes. She reports her first lifetime episode was when she was 20. She was with her mother in the park and she suddenly  pitched forward onto concrete causing a scalp laceration and was noted to be shaking for about 10 minutes (full body) with eyes closed and deviated upward. She was not aware of anyone speaking to her during this episode and her first memory is waking up in the ED about 1-2 mins after she was administered a benzodiazepine and then she was somewhat off of her normal self for the next day.      Lilliam is aware an episode is coming on at this point and will tell her mother and then sit down and then her eyes roll back, she closes her eyes and experiences full body shaking for 10-15 minutes. On each occasion EMS was called and administered a benzodiazepine which terminated the event and she regained consciousness shortly after. She has never let an episode run its course.      She has previously undergone CTH and was normal. Labs at each hospitalization have been normal. She also underwent routine EEG in 2019 which was normal. She has never been started on AED prior to my first appointment. She has also been extensively worked up by cardiology for the possibility that these were syncopal events and workup including cardiac MRI was unremarkable.     She had an episode on 1/22/2022. She was watching a movie by herself and reported that she could feel an event coming on and sat down and the event progressed same as previous. At that time she presented to the ED at [facility] and was administered benzodiazepines and the episode resolved. She was told not to drive and reportedly [employer] form was filed on her behalf.     The patient established care with me on 2/11/2022. The etiology of her events was unclear at that point. The associated injury with one episode, loss of awareness and responsiveness to benzos was consistent with epilepsy. However, the duration of events and eye closure with events is more consistent with PNES. I had a low suspicion that these were syncopal in nature. Out of an abundance of caution we started Keppra  500 BID and recommended a routine EEG and MRI brain. There was also a plan for ambulatory EEG thereafter.      She was seen in the office on 9/5/2023.  At that time it was noted that she remained free of episodes concerning for seizure despite not taking her Keppra until the summer when there was an unprovoked episode that occurred in a public place.  She was willing to start another AED at that time and complete previously ordered tests.  Recommended lamotrigine to goal dose of 150 mg twice per day, routine EEG, and MRI brain.  She was to follow-up in 3 months or sooner if needed.     She saw Lisa on 12/20/2023. Lamotrigine level was 1.1 when checked a little over a week prior.  She has not had further events concerning for seizures since her last visit. No longer taking levetiracetam. Taking lamotrigine 25 mg BID without side effects. Patient reported that she was taking 25 mg of lamotrigine twice per day and had not followed titration schedule.  Lisa discussed the importance of getting to goal dose of medication as current dose likely is not providing adequate protection from seizures. She was understanding of this. Plan at that time was to increase the lamotrigine to 150 BID.    The patient was last seen in clinic on 4/22/2024. She was seizure free on lamotrigine 150 BID. She reported memory concerns at that time. Plan at that time was to complete B12, TSH, Vit D, CMP, and CBC.    Interval history:    Her blood work revealed vitamin D deficiency and a slightly low B12 level (362). I communicated this through Epic message to the patient. Patient was aware of this and isn't taking any supplementation. She continues to complain of short term memory issues.    There have been no seizures since the last appointment. The patients AEDs were being tolerated well with no side effects. There is no concern for medication non-adherence.     Current seizure medications:  - diazepam PRN  - lamotrigine 150 mg BID  Other  medications as per Epic.     Prior Seizure Medications: levetiracetam     Previous work up  MRI brain 9/27/2023:  IMPRESSION:  Nonspecific foci of hyperintense signal on T2 and FLAIR imaging within the supratentorial white matter including the anterior superior juxtacortical white matter bilaterally and within the periatrial juxtacortical white matter on the right. No associated enhancement, restricted diffusion or susceptibility. Differential considerations would include postinflammatory abnormality such as demyelinating disease or sequela of Lyme disease. These changes could be developmental. Direct comparison to any prior   outside MRI examinations would be helpful.     Routine EEG 12/20/2019: Normal     Routine EEG 9/20/2023: Normal     History Reviewed:   The following were reviewed and updated as appropriate: allergies, current medications, past family history, past medical history, past social history, past surgical history, and problem list    ROS:  Constitutional:  Negative for appetite change, fatigue and fever.   HENT: Negative.  Negative for hearing loss, tinnitus, trouble swallowing and voice change.    Eyes: Negative.  Negative for photophobia, pain and visual disturbance.   Respiratory: Negative.  Negative for shortness of breath.    Cardiovascular: Negative.  Negative for palpitations.   Gastrointestinal: Negative.  Negative for nausea and vomiting.   Endocrine: Negative.  Negative for cold intolerance.   Genitourinary: Negative.  Negative for dysuria, frequency and urgency.   Musculoskeletal:  Negative for back pain, gait problem, myalgias, neck pain and neck stiffness.   Skin: Negative.  Negative for rash.   Allergic/Immunologic: Negative.    Neurological:  Negative for dizziness, tremors, seizures, syncope, facial asymmetry, speech difficulty, weakness, light-headedness, numbness and headaches.   Hematological: Negative.  Does not bruise/bleed easily.   Psychiatric/Behavioral:  Positive for  confusion (SHORT TERM MEMORY). Negative for hallucinations and sleep disturbance.    All other systems reviewed and are negative.    Objective    /66 (BP Location: Left arm, Patient Position: Sitting, Cuff Size: Adult)   Temp 97.8 °F (36.6 °C) (Temporal)   Wt 100 kg (221 lb 3.2 oz)   BMI 34.64 kg/m²      General Exam  General: well developed, no acute distress.  HEENT: mucous membranes moist, anicteric sclera.   Neck: supple, good ROM.  Extremities: no clubbing, cyanosis or edema.   Skin: no rash on visible skin.    Neurological Exam  Mental Status: awake, alert, and fully oriented to person, place, time, and situation. Attention and memory intact. Fund of knowledge is appropriate for age and education.  There is no neglect.    Language: fluency, and comprehension normal.       Cranial Nerves: Pupils equal and reactive to light.  Visual fields full to confrontation. Extraocular motions intact with full versions, normal pursuits and saccades. Facial strength full and symmetric. Hearing intact to voice. Tongue protrudes to midline. Palate elevates symmetrically. Speech clear without notable dysarthria.     Motor: Normal bulk and tone. No pronator drift. Strength is 5/5 proximally and distally in all 4 extremities. No involuntary movements.    Sensory: Sensation intact to light touch distally in all extremities.    Coordination: Normal finger-to-nose. Normal rapid alternating movements.     Station and gait: Casual gait normal.     Reflexes: Reflexes 2+ throughout and symmetric.      Haim Valencia MD   [facility]  Diplomate, ABPN Neurology and Clinical Neurophysiology

## 2025-05-13 DIAGNOSIS — R56.9 SEIZURE (HCC): ICD-10-CM

## 2025-05-13 RX ORDER — LAMOTRIGINE 150 MG/1
150 TABLET ORAL 2 TIMES DAILY
Qty: 60 TABLET | Refills: 0 | OUTPATIENT
Start: 2025-05-13

## 2025-05-13 NOTE — TELEPHONE ENCOUNTER
lamoTRIgine (LaMICtal) 150 MG tablet, [id number] [place] [place] [facility] with a qty of 60 tablets with 5 refills

## 2025-05-14 NOTE — PROGRESS NOTES
Assessment/Plan:       Diagnoses and all orders for this visit:    Physical exam, pre-employment    PPD screening test  -     TB Skin Test    Menstrual cramps  -     ibuprofen (MOTRIN) 600 mg tablet; Take 1 tablet (600 mg total) by mouth every 6 (six) hours as needed for mild pain        · PPD placed today for TB screening  · Physical exam findings within normal limits  · Prescribed ibuprofen for menstrual cramps  · Anxiety symptoms well controlled with Prozac and Vistaril  · Form placed in Sports Medicine folder to be completed by staff when Monster Diallo comes back for PPD read  · Already has her Flu vaccination  · Follow up in 2 days for PPD read  · Monster Diallo acknowledged understanding and agreement with the plan      Subjective:      Patient ID: Sangita Oconnor is a 25 y o  female  HPI     Monster Diallo is a 25year old female that comes to the office for a pre-employment physical exam     She wants to begin working in a  with children  She has a history of anxiety which is well-controlled with Prozac and Vistaril  She has no other major medical problems  She reports no history of foreign travel recently or injuries to her muscles, joints, or bones  Has not been restricted to working with certain populations in the past  She did obtain her Flu shot already this year  She gets back pain with her menstrual cycle and is requesting a refill of Motrin she reports that is prescribed from our office  Denies cold-like symptoms, fever, chills, night-sweats, N/V, SOB, chest pain, abdominal pain, rashes, lower extremity swelling, arthralgias  Further denies suicidal or homicidal ideation  The following portions of the patient's history were reviewed and updated as appropriate: allergies, current medications, past family history, past medical history, past social history, past surgical history and problem list     Review of Systems   Constitutional: Negative for chills and fever     HENT: Negative for congestion, rhinorrhea 14-May-2025 and sore throat  Eyes: Negative for visual disturbance  Respiratory: Negative for cough, shortness of breath and wheezing  Cardiovascular: Negative for chest pain and palpitations  Gastrointestinal: Negative for abdominal pain, constipation, diarrhea, nausea and vomiting  Genitourinary: Negative for dysuria  Musculoskeletal: Negative for myalgias  Skin: Negative for rash  Neurological: Negative for weakness, numbness and headaches  Psychiatric/Behavioral: Negative for confusion  Objective:      /70   Pulse 80   Temp 98 3 °F (36 8 °C)   Resp 18   Ht 5' 6" (1 676 m)   Wt 117 kg (257 lb)   BMI 41 48 kg/m²          Physical Exam   Constitutional: She is oriented to person, place, and time  She appears well-developed and well-nourished  No distress  HENT:   Head: Normocephalic and atraumatic  Right Ear: External ear normal    Left Ear: External ear normal    Nose: Nose normal    Eyes: EOM are normal  No scleral icterus  Neck: Neck supple  Pulmonary/Chest: Effort normal  No respiratory distress  Abdominal: Soft  Neurological: She is alert and oriented to person, place, and time  Skin: Skin is warm  She is not diaphoretic  Psychiatric: She has a normal mood and affect  16-May-2025

## 2025-05-19 DIAGNOSIS — R56.9 SEIZURE (HCC): ICD-10-CM

## 2025-05-19 RX ORDER — FOLIC ACID 1 MG/1
1000 TABLET ORAL DAILY
Qty: 90 TABLET | Refills: 1 | Status: SHIPPED | OUTPATIENT
Start: 2025-05-19

## 2025-05-30 ENCOUNTER — APPOINTMENT (OUTPATIENT)
Dept: LAB | Facility: CLINIC | Age: 30
End: 2025-05-30
Payer: COMMERCIAL

## 2025-05-30 DIAGNOSIS — E53.8 B12 DEFICIENCY: ICD-10-CM

## 2025-05-30 DIAGNOSIS — E55.9 VITAMIN D DEFICIENCY: ICD-10-CM

## 2025-05-30 LAB — VIT B12 SERPL-MCNC: 656 PG/ML (ref 180–914)

## 2025-05-30 PROCEDURE — 82652 VIT D 1 25-DIHYDROXY: CPT

## 2025-05-30 PROCEDURE — 36415 COLL VENOUS BLD VENIPUNCTURE: CPT

## 2025-05-30 PROCEDURE — 82607 VITAMIN B-12: CPT

## 2025-06-03 LAB — 1,25(OH)2D SERPL-MCNC: 37.1 PG/ML (ref 5–200)

## 2025-06-09 ENCOUNTER — RESULTS FOLLOW-UP (OUTPATIENT)
Dept: NEUROLOGY | Facility: CLINIC | Age: 30
End: 2025-06-09

## 2025-08-07 ENCOUNTER — TELEPHONE (OUTPATIENT)
Age: 30
End: 2025-08-07